# Patient Record
Sex: FEMALE | Race: WHITE | NOT HISPANIC OR LATINO | Employment: FULL TIME | URBAN - METROPOLITAN AREA
[De-identification: names, ages, dates, MRNs, and addresses within clinical notes are randomized per-mention and may not be internally consistent; named-entity substitution may affect disease eponyms.]

---

## 2017-10-02 ENCOUNTER — LAB VISIT (OUTPATIENT)
Dept: LAB | Facility: HOSPITAL | Age: 42
End: 2017-10-02
Attending: INTERNAL MEDICINE
Payer: COMMERCIAL

## 2017-10-02 DIAGNOSIS — Z01.31 ENCOUNTER FOR EXAMINATION OF BLOOD PRESSURE WITH ABNORMAL FINDINGS: ICD-10-CM

## 2017-10-02 DIAGNOSIS — J45.20 INTRINSIC ASTHMA WITHOUT STATUS ASTHMATICUS: ICD-10-CM

## 2017-10-02 DIAGNOSIS — D64.9 ABSOLUTE ANEMIA: ICD-10-CM

## 2017-10-02 DIAGNOSIS — D64.9 ABSOLUTE ANEMIA: Primary | ICD-10-CM

## 2017-10-02 LAB
ALBUMIN SERPL BCP-MCNC: 4 G/DL
ALP SERPL-CCNC: 55 U/L
ALT SERPL W/O P-5'-P-CCNC: 26 U/L
ANION GAP SERPL CALC-SCNC: 10 MMOL/L
AST SERPL-CCNC: 21 U/L
BASOPHILS # BLD AUTO: 0.03 K/UL
BASOPHILS NFR BLD: 0.4 %
BILIRUB SERPL-MCNC: 0.2 MG/DL
BUN SERPL-MCNC: 14 MG/DL
CALCIUM SERPL-MCNC: 9.7 MG/DL
CHLORIDE SERPL-SCNC: 106 MMOL/L
CHOLEST SERPL-MCNC: 144 MG/DL
CHOLEST/HDLC SERPL: 4 {RATIO}
CO2 SERPL-SCNC: 23 MMOL/L
CREAT SERPL-MCNC: 0.7 MG/DL
DIFFERENTIAL METHOD: ABNORMAL
EOSINOPHIL # BLD AUTO: 0.5 K/UL
EOSINOPHIL NFR BLD: 6.8 %
ERYTHROCYTE [DISTWIDTH] IN BLOOD BY AUTOMATED COUNT: 12.3 %
EST. GFR  (AFRICAN AMERICAN): >60 ML/MIN/1.73 M^2
EST. GFR  (NON AFRICAN AMERICAN): >60 ML/MIN/1.73 M^2
GLUCOSE SERPL-MCNC: 97 MG/DL
HCT VFR BLD AUTO: 37.6 %
HDLC SERPL-MCNC: 36 MG/DL
HDLC SERPL: 25 %
HGB BLD-MCNC: 12.7 G/DL
LDLC SERPL CALC-MCNC: 83.4 MG/DL
LYMPHOCYTES # BLD AUTO: 1.6 K/UL
LYMPHOCYTES NFR BLD: 19.8 %
MCH RBC QN AUTO: 32.2 PG
MCHC RBC AUTO-ENTMCNC: 33.8 G/DL
MCV RBC AUTO: 95 FL
MONOCYTES # BLD AUTO: 0.5 K/UL
MONOCYTES NFR BLD: 5.8 %
NEUTROPHILS # BLD AUTO: 5.4 K/UL
NEUTROPHILS NFR BLD: 66.8 %
NONHDLC SERPL-MCNC: 108 MG/DL
PLATELET # BLD AUTO: 276 K/UL
PMV BLD AUTO: 9.4 FL
POTASSIUM SERPL-SCNC: 4.4 MMOL/L
PROT SERPL-MCNC: 7.7 G/DL
RBC # BLD AUTO: 3.95 M/UL
SODIUM SERPL-SCNC: 139 MMOL/L
TRIGL SERPL-MCNC: 123 MG/DL
WBC # BLD AUTO: 7.99 K/UL

## 2017-10-02 PROCEDURE — 36415 COLL VENOUS BLD VENIPUNCTURE: CPT | Mod: PO

## 2017-10-02 PROCEDURE — 80061 LIPID PANEL: CPT

## 2017-10-02 PROCEDURE — 80053 COMPREHEN METABOLIC PANEL: CPT | Mod: PO

## 2017-10-02 PROCEDURE — 85025 COMPLETE CBC W/AUTO DIFF WBC: CPT | Mod: PO

## 2018-04-24 ENCOUNTER — HOSPITAL ENCOUNTER (EMERGENCY)
Facility: HOSPITAL | Age: 43
Discharge: HOME | End: 2018-04-25
Attending: EMERGENCY MEDICINE
Payer: COMMERCIAL

## 2018-04-24 DIAGNOSIS — R03.0 ELEVATED BLOOD PRESSURE READING WITHOUT DIAGNOSIS OF HYPERTENSION: ICD-10-CM

## 2018-04-24 DIAGNOSIS — R07.9 CHEST PAIN, UNSPECIFIED TYPE: Primary | ICD-10-CM

## 2018-04-24 DIAGNOSIS — M26.621 ARTHRALGIA OF RIGHT TEMPOROMANDIBULAR JOINT: ICD-10-CM

## 2018-04-24 LAB
ALBUMIN SERPL BCP-MCNC: 4.2 G/DL
ALP SERPL-CCNC: 77 U/L
ALT SERPL W/O P-5'-P-CCNC: 26 U/L
ANION GAP SERPL CALC-SCNC: 10 MMOL/L
AST SERPL-CCNC: 19 U/L
BASOPHILS # BLD AUTO: 0.02 K/UL
BASOPHILS NFR BLD: 0.1 %
BILIRUB SERPL-MCNC: 0.4 MG/DL
BNP SERPL-MCNC: <10 PG/ML
BUN SERPL-MCNC: 14 MG/DL
CALCIUM SERPL-MCNC: 9.8 MG/DL
CHLORIDE SERPL-SCNC: 104 MMOL/L
CO2 SERPL-SCNC: 22 MMOL/L
CREAT SERPL-MCNC: 0.8 MG/DL
D DIMER PPP IA.FEU-MCNC: 0.2 MG/L FEU
DIFFERENTIAL METHOD: ABNORMAL
EOSINOPHIL # BLD AUTO: 0.1 K/UL
EOSINOPHIL NFR BLD: 0.8 %
ERYTHROCYTE [DISTWIDTH] IN BLOOD BY AUTOMATED COUNT: 14.1 %
EST. GFR  (AFRICAN AMERICAN): >60 ML/MIN/1.73 M^2
EST. GFR  (NON AFRICAN AMERICAN): >60 ML/MIN/1.73 M^2
GLUCOSE SERPL-MCNC: 103 MG/DL
HCT VFR BLD AUTO: 34.4 %
HGB BLD-MCNC: 11.3 G/DL
LYMPHOCYTES # BLD AUTO: 1 K/UL
LYMPHOCYTES NFR BLD: 6.3 %
MCH RBC QN AUTO: 29 PG
MCHC RBC AUTO-ENTMCNC: 32.8 G/DL
MCV RBC AUTO: 88 FL
MONOCYTES # BLD AUTO: 0.9 K/UL
MONOCYTES NFR BLD: 5.4 %
NEUTROPHILS # BLD AUTO: 13.6 K/UL
NEUTROPHILS NFR BLD: 87.2 %
PLATELET # BLD AUTO: 273 K/UL
PMV BLD AUTO: 9.3 FL
POTASSIUM SERPL-SCNC: 4 MMOL/L
PROT SERPL-MCNC: 8 G/DL
RBC # BLD AUTO: 3.89 M/UL
SODIUM SERPL-SCNC: 136 MMOL/L
TROPONIN I SERPL DL<=0.01 NG/ML-MCNC: <0.006 NG/ML
WBC # BLD AUTO: 15.63 K/UL

## 2018-04-24 PROCEDURE — 80053 COMPREHEN METABOLIC PANEL: CPT

## 2018-04-24 PROCEDURE — 85379 FIBRIN DEGRADATION QUANT: CPT

## 2018-04-24 PROCEDURE — 93010 ELECTROCARDIOGRAM REPORT: CPT | Mod: ,,, | Performed by: INTERNAL MEDICINE

## 2018-04-24 PROCEDURE — 84484 ASSAY OF TROPONIN QUANT: CPT | Mod: 91

## 2018-04-24 PROCEDURE — 99284 EMERGENCY DEPT VISIT MOD MDM: CPT | Mod: 25

## 2018-04-24 PROCEDURE — 93005 ELECTROCARDIOGRAM TRACING: CPT

## 2018-04-24 PROCEDURE — 83880 ASSAY OF NATRIURETIC PEPTIDE: CPT

## 2018-04-24 PROCEDURE — 25000003 PHARM REV CODE 250: Performed by: EMERGENCY MEDICINE

## 2018-04-24 PROCEDURE — 99900035 HC TECH TIME PER 15 MIN (STAT)

## 2018-04-24 PROCEDURE — 85025 COMPLETE CBC W/AUTO DIFF WBC: CPT

## 2018-04-24 RX ORDER — NAPROXEN SODIUM 220 MG/1
324 TABLET, FILM COATED ORAL
Status: COMPLETED | OUTPATIENT
Start: 2018-04-24 | End: 2018-04-24

## 2018-04-24 RX ADMIN — ASPIRIN 81 MG 324 MG: 81 TABLET ORAL at 09:04

## 2018-04-25 VITALS
SYSTOLIC BLOOD PRESSURE: 118 MMHG | HEART RATE: 100 BPM | DIASTOLIC BLOOD PRESSURE: 70 MMHG | RESPIRATION RATE: 20 BRPM | HEIGHT: 66 IN | BODY MASS INDEX: 33.17 KG/M2 | TEMPERATURE: 99 F | OXYGEN SATURATION: 100 % | WEIGHT: 206.38 LBS

## 2018-04-25 LAB — TROPONIN I SERPL DL<=0.01 NG/ML-MCNC: <0.006 NG/ML

## 2018-04-25 NOTE — ED NOTES
Pt resting in bed. NAD, VSS, RR equal and unlabored. C/O right shoulder & neck pain off & on since yesterday. She denies left sided chest pain.  Took 800mg Motrin earlier tonight with relief.   She also states she became dizzy & weak earlier this pm.   Pt provides hx of TMJ & isnt sure if its the same pain as usual. Will continue to monitorLOC: The patient is awake, alert and aware of environment with an appropriate affect, the patient is oriented x 3 and speaking appropriately.  APPEARANCE: Patient resting comfortably and in no acute distress, patient is clean and well groomed, patient's clothing is properly fastened.  HEENT: Brief WNL  SKIN: Brief WNL.   MUSCULOSKELETAL: Brief WNL  RESPIRATORY: Brief WNL, chest clear gillian, resp unlabored  CARDIAC: Sinus tachycardia at 122/min, denies chest pain  GASTRO: Brief WNL, denies N,V or D  : Brief WNL  Peripheral Vasc: Brief WNL, No edema, positive distal pulses  NEURO: Brief WNL  PSYCH: Brief WNL

## 2018-04-25 NOTE — ED PROVIDER NOTES
Encounter Date: 4/24/2018       History     Chief Complaint   Patient presents with    Chest Pain     patient is reporting chest shoulder and jaw pain on right side onset last pm and today. She also reports being dizzy and weak.     Patient currently presents with chief complaint of chest pain.  This current episode began around 7PM this evening though she has had symptoms intermittently since last PM.  This is localized to the right shoulder and right jaw region.  Patient denies shortness of breath, denies palpitations,  reports nausea and weakness, denies diaphoresis.  Patient denies aspirin use in the last 24 hours or regular use at home. Patient denies history of known CAD.  Cardiac risk factors include:  tobacco use at 1/4PPD.  Denies FH of CAD, HTN, DM.          Review of patient's allergies indicates:  No Known Allergies  Past Medical History:   Diagnosis Date    Asthma     History of viral warts     warts to fingers    Thyroid disease      Past Surgical History:   Procedure Laterality Date    CHOLECYSTECTOMY       Family History   Problem Relation Age of Onset    Diabetes Mother     Hypertension Mother      Social History   Substance Use Topics    Smoking status: Former Smoker     Packs/day: 0.30     Types: Cigarettes     Quit date: 7/1/2013    Smokeless tobacco: Never Used    Alcohol use No     Review of Systems   Constitutional: Negative for chills and fever.   HENT: Negative for congestion and rhinorrhea.    Respiratory: Negative for cough, shortness of breath and wheezing.    Cardiovascular: Negative for palpitations and leg swelling.   Gastrointestinal: Positive for nausea. Negative for abdominal pain, constipation, diarrhea and vomiting.   Genitourinary: Negative for dysuria, frequency, urgency, vaginal bleeding and vaginal discharge.   Skin: Negative for color change and rash.   Allergic/Immunologic: Negative for immunocompromised state.   Neurological: Positive for weakness. Negative for  "dizziness and numbness.   Hematological: Negative for adenopathy. Does not bruise/bleed easily.   All other systems reviewed and are negative.      Physical Exam     Initial Vitals [04/24/18 2108]   BP Pulse Resp Temp SpO2   138/65 105 16 99.4 °F (37.4 °C) 100 %      MAP       89.33         Vitals:    04/24/18 2108   BP: 138/65   Pulse: 105   Resp: 16   Temp: 99.4 °F (37.4 °C)   TempSrc: Oral   SpO2: 100%   Weight: 93.6 kg (206 lb 6.4 oz)   Height: 5' 6" (1.676 m)         Physical Exam    Nursing note and vitals reviewed.  Constitutional: She appears well-developed and well-nourished. She is not diaphoretic. No distress.   HENT:   Head: Normocephalic and atraumatic.   Right Ear: External ear normal.   Left Ear: External ear normal.   Nose: Nose normal.   Mouth/Throat: Oropharynx is clear and moist.   Eyes: Conjunctivae and EOM are normal. Pupils are equal, round, and reactive to light. No scleral icterus.   Neck: Neck supple. No tracheal deviation present. No JVD present.   Cardiovascular: Normal rate, regular rhythm, normal heart sounds and intact distal pulses. Exam reveals no gallop and no friction rub.    No murmur heard.  Pulmonary/Chest: Breath sounds normal. No respiratory distress. She has no wheezes. She has no rhonchi. She has no rales.   Abdominal: Soft. Bowel sounds are normal. She exhibits no distension. There is no tenderness.   Musculoskeletal: Normal range of motion. She exhibits no edema.   Neurological: She is alert and oriented to person, place, and time. She has normal strength. No cranial nerve deficit or sensory deficit.   Skin: Skin is warm and dry. No rash noted.   Psychiatric: She has a normal mood and affect. Her behavior is normal.         ED Course   Procedures  Labs Reviewed   CBC W/ AUTO DIFFERENTIAL - Abnormal; Notable for the following:        Result Value    WBC 15.63 (*)     RBC 3.89 (*)     Hemoglobin 11.3 (*)     Hematocrit 34.4 (*)     Gran # (ANC) 13.6 (*)     Gran% 87.2 (*)     " Lymph% 6.3 (*)     All other components within normal limits   COMPREHENSIVE METABOLIC PANEL - Abnormal; Notable for the following:     CO2 22 (*)     All other components within normal limits   B-TYPE NATRIURETIC PEPTIDE   TROPONIN I   D DIMER, QUANTITATIVE     EKG Readings: (Independently Interpreted)   Initial Reading: No STEMI. Rhythm: Sinus Tachycardia. Heart Rate: 111. Ectopy: No Ectopy.     Imaging Results          X-Ray Chest AP Portable (Final result)  Result time 04/24/18 21:58:36    Final result by Mateo Biggs III, MD (04/24/18 21:58:36)                 Impression:         Negative one view chest x-ray.      Electronically signed by: MATEO BIGGS MD  Date:     04/24/18  Time:    21:58              Narrative:    One view chest x-ray.    Clinical indication: Chest pain    Heart size is normal. The lung fields are clear. No acute pulmonary infiltrate.                                 Medical Decision Making:   Differential Diagnosis:   YUMIKO Risk Score:  1 point  5% risk of MACE at 14days    INPUTS:  Age =65 -> 0 = No  =3 CAD risk factors -> 0 = No  Known CAD (stenosis =50%) -> 0 = No  ASA use in past 7 days -> 0 = No  Severe angina (=2 episodes in 24 hrs) -> 0 = No  EKG ST changes =0.5mm -> 1 = Yes  Positive cardiac marker -> 0 = No      Wells' Criteria for Pulmonary Embolism  1.5 points  Low risk group: 1.3% chance of PE    INPUTS:  Clinical signs and symptoms of DVT -> 0 = No  PE is #1 diagnosis OR equally likely -> 0 = No  Heart rate > 100 -> 1.5 = Yes  Immobilization at least 3 days OR surgery in the previous 4 weeks -> 0 = No  Previous, objectively diagnosed PE or DVT -> 0 = No  Hemoptysis -> 0 = No  Malignancy w/ treatment within 6 months or palliative -> 0 = No    ED Management:  All findings were reviewed with the patient/family in detail along with the diagnosis of chest pain.  Given the patient's low risk for MACE based on a carefully determined YUMIKO score (1) and overall clinical  judgement, unremarkable troponin levels drawn at the time of arrival and again 2 hours later have been used to effectively rule out ACS (as per AHA recommendations).  Additionally, I have no considerable suspicion for aortic dissection/aneurysm, esophageal perforation, or acute pulmonary complications based on the presentation, exam, lab results, or imaging.    I see no indication of an emergent process beyond that addressed during our encounter but have duly counseled the patient/family regarding the need for prompt outpatient follow-up as well as the indications that should prompt immediate return to the emergency room should new or worrisome developments occur.  The patient has been provided with both verbal and written instructions following the encounter.  The patient/family communicates understanding of all this information and all remaining questions and concerns were addressed at this time.                          Clinical Impression:   The primary encounter diagnosis was Chest pain, unspecified type. Diagnoses of Arthralgia of right temporomandibular joint and Elevated blood pressure reading without diagnosis of hypertension were also pertinent to this visit.                           Jim Hernández MD  04/25/18 0034

## 2018-04-25 NOTE — DISCHARGE INSTRUCTIONS
Based on vital signs taken here in the emergency room today, the patient was additionally counseled regarding an elevated blood pressure concerning for pre-hypertension/hypertension.  Accordingly the patient has been advised to follow with the primary care physician for reassessment and management as needed.

## 2021-12-27 ENCOUNTER — HOSPITAL ENCOUNTER (INPATIENT)
Facility: HOSPITAL | Age: 46
LOS: 11 days | Discharge: NURSING, REHAB OR LONG-TERM CARE | DRG: 177 | End: 2022-01-08
Attending: EMERGENCY MEDICINE | Admitting: INTERNAL MEDICINE
Payer: COMMERCIAL

## 2021-12-27 DIAGNOSIS — Z20.822 SUSPECTED COVID-19 VIRUS INFECTION: ICD-10-CM

## 2021-12-27 DIAGNOSIS — R06.02 SOB (SHORTNESS OF BREATH): ICD-10-CM

## 2021-12-27 DIAGNOSIS — R07.9 CHEST PAIN: ICD-10-CM

## 2021-12-27 DIAGNOSIS — J18.9 ATYPICAL PNEUMONIA: ICD-10-CM

## 2021-12-27 DIAGNOSIS — J96.01 ACUTE RESPIRATORY FAILURE WITH HYPOXIA: Primary | ICD-10-CM

## 2021-12-27 DIAGNOSIS — J45.901 SEVERE ASTHMA WITH ACUTE EXACERBATION, UNSPECIFIED WHETHER PERSISTENT: ICD-10-CM

## 2021-12-27 PROBLEM — R06.89 ACUTE RESPIRATORY INSUFFICIENCY: Status: ACTIVE | Noted: 2021-12-27

## 2021-12-27 LAB
ALBUMIN SERPL BCP-MCNC: 3 G/DL (ref 3.5–5.2)
ALP SERPL-CCNC: 207 U/L (ref 55–135)
ALT SERPL W/O P-5'-P-CCNC: 45 U/L (ref 10–44)
ANION GAP SERPL CALC-SCNC: 14 MMOL/L (ref 8–16)
AST SERPL-CCNC: 50 U/L (ref 10–40)
B-HCG UR QL: NEGATIVE
BASOPHILS # BLD AUTO: 0.01 K/UL (ref 0–0.2)
BASOPHILS NFR BLD: 0.1 % (ref 0–1.9)
BILIRUB SERPL-MCNC: 0.6 MG/DL (ref 0.1–1)
BILIRUB UR QL STRIP: NEGATIVE
BNP SERPL-MCNC: 38 PG/ML (ref 0–99)
BUN SERPL-MCNC: 8 MG/DL (ref 6–20)
CALCIUM SERPL-MCNC: 9 MG/DL (ref 8.7–10.5)
CHLORIDE SERPL-SCNC: 101 MMOL/L (ref 95–110)
CLARITY UR REFRACT.AUTO: CLEAR
CO2 SERPL-SCNC: 23 MMOL/L (ref 23–29)
COLOR UR AUTO: YELLOW
CREAT SERPL-MCNC: 0.7 MG/DL (ref 0.5–1.4)
CTP QC/QA: YES
CTP QC/QA: YES
DIFFERENTIAL METHOD: ABNORMAL
EOSINOPHIL # BLD AUTO: 0 K/UL (ref 0–0.5)
EOSINOPHIL NFR BLD: 0.1 % (ref 0–8)
ERYTHROCYTE [DISTWIDTH] IN BLOOD BY AUTOMATED COUNT: 12.6 % (ref 11.5–14.5)
ERYTHROCYTE [SEDIMENTATION RATE] IN BLOOD BY WESTERGREN METHOD: 97 MM/HR (ref 0–20)
EST. GFR  (AFRICAN AMERICAN): >60 ML/MIN/1.73 M^2
EST. GFR  (NON AFRICAN AMERICAN): >60 ML/MIN/1.73 M^2
FERRITIN SERPL-MCNC: 1084 NG/ML (ref 20–300)
GLUCOSE SERPL-MCNC: 104 MG/DL (ref 70–110)
GLUCOSE UR QL STRIP: NEGATIVE
HCT VFR BLD AUTO: 37.2 % (ref 37–48.5)
HEP C VIRUS HOLD SPECIMEN: NORMAL
HGB BLD-MCNC: 12.5 G/DL (ref 12–16)
HGB UR QL STRIP: NEGATIVE
IMM GRANULOCYTES # BLD AUTO: 0.04 K/UL (ref 0–0.04)
IMM GRANULOCYTES NFR BLD AUTO: 0.5 % (ref 0–0.5)
KETONES UR QL STRIP: ABNORMAL
LDH SERPL L TO P-CCNC: 518 U/L (ref 110–260)
LEUKOCYTE ESTERASE UR QL STRIP: NEGATIVE
LYMPHOCYTES # BLD AUTO: 0.5 K/UL (ref 1–4.8)
LYMPHOCYTES NFR BLD: 6.5 % (ref 18–48)
MCH RBC QN AUTO: 32.3 PG (ref 27–31)
MCHC RBC AUTO-ENTMCNC: 33.6 G/DL (ref 32–36)
MCV RBC AUTO: 96 FL (ref 82–98)
MONOCYTES # BLD AUTO: 0.2 K/UL (ref 0.3–1)
MONOCYTES NFR BLD: 2.4 % (ref 4–15)
NEUTROPHILS # BLD AUTO: 6.9 K/UL (ref 1.8–7.7)
NEUTROPHILS NFR BLD: 90.4 % (ref 38–73)
NITRITE UR QL STRIP: NEGATIVE
NRBC BLD-RTO: 0 /100 WBC
PH UR STRIP: 6 [PH] (ref 5–8)
PLATELET # BLD AUTO: 167 K/UL (ref 150–450)
PLATELET BLD QL SMEAR: ABNORMAL
PMV BLD AUTO: 10.3 FL (ref 9.2–12.9)
POC MOLECULAR INFLUENZA A AGN: NEGATIVE
POC MOLECULAR INFLUENZA B AGN: NEGATIVE
POTASSIUM SERPL-SCNC: 3.6 MMOL/L (ref 3.5–5.1)
PROCALCITONIN SERPL IA-MCNC: 0.16 NG/ML
PROT SERPL-MCNC: 7.6 G/DL (ref 6–8.4)
PROT UR QL STRIP: NEGATIVE
RBC # BLD AUTO: 3.87 M/UL (ref 4–5.4)
SARS-COV-2 RDRP RESP QL NAA+PROBE: NEGATIVE
SODIUM SERPL-SCNC: 138 MMOL/L (ref 136–145)
SP GR UR STRIP: <=1.005 (ref 1–1.03)
TROPONIN I SERPL DL<=0.01 NG/ML-MCNC: <0.006 NG/ML (ref 0–0.03)
URN SPEC COLLECT METH UR: ABNORMAL
UROBILINOGEN UR STRIP-ACNC: NEGATIVE EU/DL
WBC # BLD AUTO: 7.64 K/UL (ref 3.9–12.7)

## 2021-12-27 PROCEDURE — 93010 EKG 12-LEAD: ICD-10-PCS | Mod: ,,, | Performed by: INTERNAL MEDICINE

## 2021-12-27 PROCEDURE — 84145 PROCALCITONIN (PCT): CPT | Performed by: NURSE PRACTITIONER

## 2021-12-27 PROCEDURE — 80053 COMPREHEN METABOLIC PANEL: CPT | Mod: ER | Performed by: EMERGENCY MEDICINE

## 2021-12-27 PROCEDURE — 83880 ASSAY OF NATRIURETIC PEPTIDE: CPT | Performed by: NURSE PRACTITIONER

## 2021-12-27 PROCEDURE — 85025 COMPLETE CBC W/AUTO DIFF WBC: CPT | Mod: ER | Performed by: EMERGENCY MEDICINE

## 2021-12-27 PROCEDURE — U0002 COVID-19 LAB TEST NON-CDC: HCPCS | Mod: ER | Performed by: EMERGENCY MEDICINE

## 2021-12-27 PROCEDURE — 81025 URINE PREGNANCY TEST: CPT | Mod: ER | Performed by: EMERGENCY MEDICINE

## 2021-12-27 PROCEDURE — 94640 AIRWAY INHALATION TREATMENT: CPT

## 2021-12-27 PROCEDURE — 93005 ELECTROCARDIOGRAM TRACING: CPT | Mod: ER

## 2021-12-27 PROCEDURE — 84484 ASSAY OF TROPONIN QUANT: CPT | Mod: ER | Performed by: EMERGENCY MEDICINE

## 2021-12-27 PROCEDURE — 85651 RBC SED RATE NONAUTOMATED: CPT | Performed by: NURSE PRACTITIONER

## 2021-12-27 PROCEDURE — 25000003 PHARM REV CODE 250: Mod: ER | Performed by: EMERGENCY MEDICINE

## 2021-12-27 PROCEDURE — 99291 CRITICAL CARE FIRST HOUR: CPT | Mod: 25,ER

## 2021-12-27 PROCEDURE — G0378 HOSPITAL OBSERVATION PER HR: HCPCS | Mod: ER

## 2021-12-27 PROCEDURE — 99900035 HC TECH TIME PER 15 MIN (STAT): Mod: ER

## 2021-12-27 PROCEDURE — 82728 ASSAY OF FERRITIN: CPT | Performed by: NURSE PRACTITIONER

## 2021-12-27 PROCEDURE — 25000242 PHARM REV CODE 250 ALT 637 W/ HCPCS: Mod: ER | Performed by: EMERGENCY MEDICINE

## 2021-12-27 PROCEDURE — 36415 COLL VENOUS BLD VENIPUNCTURE: CPT | Performed by: NURSE PRACTITIONER

## 2021-12-27 PROCEDURE — 86141 C-REACTIVE PROTEIN HS: CPT | Performed by: NURSE PRACTITIONER

## 2021-12-27 PROCEDURE — 25000242 PHARM REV CODE 250 ALT 637 W/ HCPCS: Performed by: NURSE PRACTITIONER

## 2021-12-27 PROCEDURE — 99900035 HC TECH TIME PER 15 MIN (STAT)

## 2021-12-27 PROCEDURE — 96374 THER/PROPH/DIAG INJ IV PUSH: CPT | Mod: ER

## 2021-12-27 PROCEDURE — 94640 AIRWAY INHALATION TREATMENT: CPT | Mod: ER

## 2021-12-27 PROCEDURE — 81003 URINALYSIS AUTO W/O SCOPE: CPT | Mod: ER | Performed by: EMERGENCY MEDICINE

## 2021-12-27 PROCEDURE — 83615 LACTATE (LD) (LDH) ENZYME: CPT | Performed by: NURSE PRACTITIONER

## 2021-12-27 PROCEDURE — G0378 HOSPITAL OBSERVATION PER HR: HCPCS

## 2021-12-27 PROCEDURE — 63600175 PHARM REV CODE 636 W HCPCS: Performed by: NURSE PRACTITIONER

## 2021-12-27 PROCEDURE — 25000003 PHARM REV CODE 250: Performed by: NURSE PRACTITIONER

## 2021-12-27 PROCEDURE — 63600175 PHARM REV CODE 636 W HCPCS: Mod: ER | Performed by: EMERGENCY MEDICINE

## 2021-12-27 PROCEDURE — 93010 ELECTROCARDIOGRAM REPORT: CPT | Mod: ,,, | Performed by: INTERNAL MEDICINE

## 2021-12-27 PROCEDURE — 25500020 PHARM REV CODE 255: Performed by: FAMILY MEDICINE

## 2021-12-27 RX ORDER — MAGNESIUM SULFATE 1 G/100ML
1 INJECTION INTRAVENOUS ONCE
Status: DISCONTINUED | OUTPATIENT
Start: 2021-12-27 | End: 2021-12-27

## 2021-12-27 RX ORDER — NALOXONE HCL 0.4 MG/ML
0.02 VIAL (ML) INJECTION
Status: DISCONTINUED | OUTPATIENT
Start: 2021-12-27 | End: 2022-01-08 | Stop reason: HOSPADM

## 2021-12-27 RX ORDER — ENOXAPARIN SODIUM 100 MG/ML
40 INJECTION SUBCUTANEOUS EVERY 24 HOURS
Status: DISCONTINUED | OUTPATIENT
Start: 2021-12-27 | End: 2021-12-28

## 2021-12-27 RX ORDER — LEVOTHYROXINE SODIUM 125 UG/1
125 TABLET ORAL DAILY
Status: DISCONTINUED | OUTPATIENT
Start: 2021-12-28 | End: 2022-01-08 | Stop reason: HOSPADM

## 2021-12-27 RX ORDER — TALC
6 POWDER (GRAM) TOPICAL NIGHTLY PRN
Status: DISCONTINUED | OUTPATIENT
Start: 2021-12-27 | End: 2022-01-08 | Stop reason: HOSPADM

## 2021-12-27 RX ORDER — ALBUTEROL SULFATE 90 UG/1
2 AEROSOL, METERED RESPIRATORY (INHALATION) EVERY 6 HOURS
Status: DISCONTINUED | OUTPATIENT
Start: 2021-12-27 | End: 2021-12-28

## 2021-12-27 RX ORDER — MAGNESIUM SULFATE HEPTAHYDRATE 40 MG/ML
2 INJECTION, SOLUTION INTRAVENOUS
Status: DISCONTINUED | OUTPATIENT
Start: 2021-12-27 | End: 2021-12-27

## 2021-12-27 RX ORDER — IPRATROPIUM BROMIDE AND ALBUTEROL SULFATE 2.5; .5 MG/3ML; MG/3ML
3 SOLUTION RESPIRATORY (INHALATION)
Status: COMPLETED | OUTPATIENT
Start: 2021-12-27 | End: 2021-12-27

## 2021-12-27 RX ORDER — LANOLIN ALCOHOL/MO/W.PET/CERES
800 CREAM (GRAM) TOPICAL
Status: DISCONTINUED | OUTPATIENT
Start: 2021-12-27 | End: 2022-01-08 | Stop reason: HOSPADM

## 2021-12-27 RX ORDER — SODIUM CHLORIDE 0.9 % (FLUSH) 0.9 %
10 SYRINGE (ML) INJECTION EVERY 8 HOURS PRN
Status: DISCONTINUED | OUTPATIENT
Start: 2021-12-27 | End: 2022-01-08 | Stop reason: HOSPADM

## 2021-12-27 RX ORDER — ACETAMINOPHEN 325 MG/1
650 TABLET ORAL EVERY 4 HOURS PRN
Status: DISCONTINUED | OUTPATIENT
Start: 2021-12-27 | End: 2022-01-08 | Stop reason: HOSPADM

## 2021-12-27 RX ORDER — ONDANSETRON 2 MG/ML
4 INJECTION INTRAMUSCULAR; INTRAVENOUS EVERY 8 HOURS PRN
Status: DISCONTINUED | OUTPATIENT
Start: 2021-12-27 | End: 2022-01-08 | Stop reason: HOSPADM

## 2021-12-27 RX ORDER — PROCHLORPERAZINE EDISYLATE 5 MG/ML
5 INJECTION INTRAMUSCULAR; INTRAVENOUS EVERY 6 HOURS PRN
Status: DISCONTINUED | OUTPATIENT
Start: 2021-12-27 | End: 2022-01-08 | Stop reason: HOSPADM

## 2021-12-27 RX ORDER — SIMETHICONE 80 MG
1 TABLET,CHEWABLE ORAL 4 TIMES DAILY PRN
Status: DISCONTINUED | OUTPATIENT
Start: 2021-12-27 | End: 2022-01-08 | Stop reason: HOSPADM

## 2021-12-27 RX ORDER — METHYLPREDNISOLONE SOD SUCC 125 MG
125 VIAL (EA) INJECTION
Status: COMPLETED | OUTPATIENT
Start: 2021-12-27 | End: 2021-12-27

## 2021-12-27 RX ORDER — MAG HYDROX/ALUMINUM HYD/SIMETH 200-200-20
30 SUSPENSION, ORAL (FINAL DOSE FORM) ORAL 4 TIMES DAILY PRN
Status: DISCONTINUED | OUTPATIENT
Start: 2021-12-27 | End: 2022-01-08 | Stop reason: HOSPADM

## 2021-12-27 RX ORDER — ACETAMINOPHEN 325 MG/1
650 TABLET ORAL EVERY 8 HOURS PRN
Status: DISCONTINUED | OUTPATIENT
Start: 2021-12-27 | End: 2022-01-08 | Stop reason: HOSPADM

## 2021-12-27 RX ADMIN — IPRATROPIUM BROMIDE AND ALBUTEROL SULFATE 3 ML: 2.5; .5 SOLUTION RESPIRATORY (INHALATION) at 08:12

## 2021-12-27 RX ADMIN — ENOXAPARIN SODIUM 40 MG: 100 INJECTION SUBCUTANEOUS at 10:12

## 2021-12-27 RX ADMIN — ACETAMINOPHEN 650 MG: 325 TABLET ORAL at 08:12

## 2021-12-27 RX ADMIN — SODIUM CHLORIDE, SODIUM LACTATE, POTASSIUM CHLORIDE, AND CALCIUM CHLORIDE 1000 ML: .6; .31; .03; .02 INJECTION, SOLUTION INTRAVENOUS at 12:12

## 2021-12-27 RX ADMIN — IOHEXOL 100 ML: 350 INJECTION, SOLUTION INTRAVENOUS at 06:12

## 2021-12-27 RX ADMIN — CEFTRIAXONE 1 G: 1 INJECTION, SOLUTION INTRAVENOUS at 11:12

## 2021-12-27 RX ADMIN — METHYLPREDNISOLONE SODIUM SUCCINATE 125 MG: 125 INJECTION, POWDER, FOR SOLUTION INTRAMUSCULAR; INTRAVENOUS at 09:12

## 2021-12-27 RX ADMIN — AZITHROMYCIN MONOHYDRATE 500 MG: 500 INJECTION, POWDER, LYOPHILIZED, FOR SOLUTION INTRAVENOUS at 12:12

## 2021-12-27 RX ADMIN — Medication 6 MG: at 08:12

## 2021-12-27 RX ADMIN — ALBUTEROL SULFATE 2 PUFF: 90 AEROSOL, METERED RESPIRATORY (INHALATION) at 09:12

## 2021-12-27 NOTE — ED PROVIDER NOTES
Encounter Date: 2021       History     Chief Complaint   Patient presents with    Shortness of Breath     Yanira Medina is a 46 y.o. female who presents with 2-3 days of gradual onset, worsening, severe SOB with associated chest tightness and cough at home.  Multiple family members sick with viral/URI symptoms and one tested positive for COVID-19.         Review of patient's allergies indicates:  No Known Allergies  Past Medical History:   Diagnosis Date    Asthma     History of viral warts     warts to fingers    Thyroid disease      Past Surgical History:   Procedure Laterality Date    CHOLECYSTECTOMY      HYSTERECTOMY       Family History   Problem Relation Age of Onset    Diabetes Mother     Hypertension Mother      Social History     Tobacco Use    Smoking status: Former Smoker     Packs/day: 0.30     Types: Cigarettes     Quit date: 2013     Years since quittin.4    Smokeless tobacco: Never Used   Substance Use Topics    Alcohol use: No    Drug use: No     Review of Systems   Constitutional: Positive for fatigue. Negative for fever.   HENT: Negative.    Eyes: Negative.    Respiratory: Positive for cough, chest tightness and shortness of breath.    Cardiovascular: Negative.    Gastrointestinal: Negative.    Endocrine: Negative.    Genitourinary: Negative.    Musculoskeletal: Negative.    Skin: Negative.    Allergic/Immunologic: Negative.    Neurological: Negative.    Hematological: Negative.    Psychiatric/Behavioral: Negative.    All other systems reviewed and are negative.      Physical Exam     Initial Vitals [21 0844]   BP Pulse Resp Temp SpO2   131/60 99 20 98.1 °F (36.7 °C) (!) 88 %      MAP       --         Physical Exam    Nursing note and vitals reviewed.  Constitutional: She appears well-developed and well-nourished. No distress.   HENT:   Head: Normocephalic and atraumatic.   Eyes: Conjunctivae and EOM are normal. Pupils are equal, round, and reactive to  light.   Neck: Neck supple.   Cardiovascular: Normal rate, regular rhythm, normal heart sounds and intact distal pulses.   Pulmonary/Chest: She is in respiratory distress. She has rales (faint, scattered).   Decreased/tight air exchange but without active wheezes; tachypnea; can't complete a sentence   Abdominal: Abdomen is soft. She exhibits no distension. There is no abdominal tenderness.   Musculoskeletal:         General: Normal range of motion.      Cervical back: Neck supple.     Neurological: She is alert and oriented to person, place, and time. She has normal strength.   Skin: Skin is warm and dry. Capillary refill takes less than 2 seconds.   Psychiatric: She has a normal mood and affect.         ED Course   Critical Care    Date/Time: 12/27/2021 10:40 AM  Performed by: Bhanu Abad MD  Authorized by: Bhanu Abad MD   Direct patient critical care time: 15 minutes  Additional history critical care time: 5 minutes  Ordering / reviewing critical care time: 5 minutes  Documentation critical care time: 7 minutes  Consulting other physicians critical care time: 5 minutes  Total critical care time (exclusive of procedural time) : 37 minutes  Critical care time was exclusive of separately billable procedures and treating other patients and teaching time.  Critical care was necessary to treat or prevent imminent or life-threatening deterioration of the following conditions: respiratory failure.  Critical care was time spent personally by me on the following activities: blood draw for specimens, development of treatment plan with patient or surrogate, discussions with consultants, evaluation of patient's response to treatment, examination of patient, obtaining history from patient or surrogate, ordering and performing treatments and interventions, ordering and review of laboratory studies, ordering and review of radiographic studies, pulse oximetry, re-evaluation of patient's condition and review of  old charts.        Labs Reviewed   URINALYSIS, REFLEX TO URINE CULTURE - Abnormal; Notable for the following components:       Result Value    Specific Gravity, UA <=1.005 (*)     Ketones, UA 2+ (*)     All other components within normal limits    Narrative:     Specimen Source->Urine   CBC W/ AUTO DIFFERENTIAL - Abnormal; Notable for the following components:    RBC 3.87 (*)     MCH 32.3 (*)     Lymph # 0.5 (*)     Mono # 0.2 (*)     Gran % 90.4 (*)     Lymph % 6.5 (*)     Mono % 2.4 (*)     All other components within normal limits    Narrative:     Release to patient->Immediate   COMPREHENSIVE METABOLIC PANEL - Abnormal; Notable for the following components:    Albumin 3.0 (*)     Alkaline Phosphatase 207 (*)     AST 50 (*)     ALT 45 (*)     All other components within normal limits    Narrative:     Release to patient->Immediate   PREGNANCY TEST, URINE RAPID    Narrative:     Specimen Source->Urine   TROPONIN I    Narrative:     Release to patient->Immediate   HEP C VIRUS HOLD SPECIMEN   POCT INFLUENZA A/B MOLECULAR   SARS-COV-2 RDRP GENE    Narrative:     This test utilizes isothermal nucleic acid amplification   technology to detect the SARS-CoV-2 RdRp nucleic acid segment.   The analytical sensitivity (limit of detection) is 125 genome   equivalents/mL.   A POSITIVE result implies infection with the SARS-CoV-2 virus;   the patient is presumed to be contagious.     A NEGATIVE result means that SARS-CoV-2 nucleic acids are not   present above the limit of detection. A NEGATIVE result should be   treated as presumptive. It does not rule out the possibility of   COVID-19 and should not be the sole basis for treatment decisions.   If COVID-19 is strongly suspected based on clinical and exposure   history, re-testing using an alternate molecular assay should be   considered.   This test is only for use under the Food and Drug   Administration s Emergency Use Authorization (EUA).   Commercial kits are provided by  [employer].   Performance characteristics of the EUA have been independently   verified by [facility] Department of   Pathology and Laboratory Medicine.   _________________________________________________________________   The authorized Fact Sheet for Healthcare Providers and the authorized Fact   Sheet for Patients of the ID NOW COVID-19 are available on the FDA   website:     https://www.fda.gov/media/053815/download  https://www.fda.gov/media/530723/download              Imaging Results          X-Ray Chest AP Portable (Final result)  Result time 12/27/21 09:27:27    Final result by Mateo Wren MD (12/27/21 09:27:27)                 Impression:      Scattered pulmonary infiltrates. Findings are not entirely specific but can be seen with multifocal airspace disease.      Electronically signed by: Mateo Wren MD  Date:    12/27/2021  Time:    09:27             Narrative:    EXAMINATION:  XR CHEST AP PORTABLE    CLINICAL HISTORY:  Shortness of breath    FINDINGS:  Single view of the chest.  04/24/2018    Cardiac silhouette is normal.  Scattered pulmonary infiltrates. Findings are not entirely specific but can be seen with multifocal airspace disease.  No evidence of pleural effusion or pneumothorax.  Bones appear intact.                                 Medications   azithromycin 500 mg in dextrose 5 % 250 mL IVPB (ready to mix system) (has no administration in time range)   cefTRIAXone (ROCEPHIN) 1 g/50 mL D5W IVPB (has no administration in time range)   lactated ringers bolus 1,000 mL (has no administration in time range)   magnesium sulfate in dextrose IVPB (premix) 1 g (has no administration in time range)     Followed by   magnesium sulfate in dextrose IVPB (premix) 1 g (has no administration in time range)   albuterol-ipratropium 2.5 mg-0.5 mg/3 mL nebulizer solution 3 mL (3 mLs Nebulization Given 12/27/21 0858)   methylPREDNISolone sodium succinate injection 125 mg (125 mg  Intravenous Given 12/27/21 0922)                 ED Course as of 12/27/21 1042   Mon Dec 27, 2021   1041 SpO2 86% at rest after nebs x3 and solumedrol.  [ND]      ED Course User Index  [ND] Bhanu Abad MD            Recent Results (from the past 24 hour(s))   CBC auto differential    Collection Time: 12/27/21  8:55 AM   Result Value Ref Range    WBC 7.64 3.90 - 12.70 K/uL    RBC 3.87 (L) 4.00 - 5.40 M/uL    Hemoglobin 12.5 12.0 - 16.0 g/dL    Hematocrit 37.2 37.0 - 48.5 %    MCV 96 82 - 98 fL    MCH 32.3 (H) 27.0 - 31.0 pg    MCHC 33.6 32.0 - 36.0 g/dL    RDW 12.6 11.5 - 14.5 %    Platelets 167 150 - 450 K/uL    MPV 10.3 9.2 - 12.9 fL    Immature Granulocytes 0.5 0.0 - 0.5 %    Gran # (ANC) 6.9 1.8 - 7.7 K/uL    Immature Grans (Abs) 0.04 0.00 - 0.04 K/uL    Lymph # 0.5 (L) 1.0 - 4.8 K/uL    Mono # 0.2 (L) 0.3 - 1.0 K/uL    Eos # 0.0 0.0 - 0.5 K/uL    Baso # 0.01 0.00 - 0.20 K/uL    nRBC 0 0 /100 WBC    Gran % 90.4 (H) 38.0 - 73.0 %    Lymph % 6.5 (L) 18.0 - 48.0 %    Mono % 2.4 (L) 4.0 - 15.0 %    Eosinophil % 0.1 0.0 - 8.0 %    Basophil % 0.1 0.0 - 1.9 %    Platelet Estimate Appears normal     Differential Method Automated    Comprehensive metabolic panel    Collection Time: 12/27/21  8:55 AM   Result Value Ref Range    Sodium 138 136 - 145 mmol/L    Potassium 3.6 3.5 - 5.1 mmol/L    Chloride 101 95 - 110 mmol/L    CO2 23 23 - 29 mmol/L    Glucose 104 70 - 110 mg/dL    BUN 8 6 - 20 mg/dL    Creatinine 0.7 0.5 - 1.4 mg/dL    Calcium 9.0 8.7 - 10.5 mg/dL    Total Protein 7.6 6.0 - 8.4 g/dL    Albumin 3.0 (L) 3.5 - 5.2 g/dL    Total Bilirubin 0.6 0.1 - 1.0 mg/dL    Alkaline Phosphatase 207 (H) 55 - 135 U/L    AST 50 (H) 10 - 40 U/L    ALT 45 (H) 10 - 44 U/L    Anion Gap 14 8 - 16 mmol/L    eGFR if African American >60.0 >60 mL/min/1.73 m^2    eGFR if non African American >60.0 >60 mL/min/1.73 m^2   Troponin I    Collection Time: 12/27/21  8:55 AM   Result Value Ref Range    Troponin I <0.006 0.000 - 0.026  ng/mL   POCT Influenza A/B Molecular    Collection Time: 12/27/21  9:08 AM   Result Value Ref Range    POC Molecular Influenza A Ag Negative Negative, Not Reported    POC Molecular Influenza B Ag Negative Negative, Not Reported     Acceptable Yes    POCT COVID-19 Rapid Screening    Collection Time: 12/27/21  9:08 AM   Result Value Ref Range    POC Rapid COVID Negative Negative     Acceptable Yes    Urinalysis, Reflex to Urine Culture Urine, Clean Catch    Collection Time: 12/27/21  9:53 AM    Specimen: Urine   Result Value Ref Range    Specimen UA Urine, Clean Catch     Color, UA Yellow Yellow, Straw, Mikki    Appearance, UA Clear Clear    pH, UA 6.0 5.0 - 8.0    Specific Gravity, UA <=1.005 (A) 1.005 - 1.030    Protein, UA Negative Negative    Glucose, UA Negative Negative    Ketones, UA 2+ (A) Negative    Bilirubin (UA) Negative Negative    Occult Blood UA Negative Negative    Nitrite, UA Negative Negative    Urobilinogen, UA Negative <2.0 EU/dL    Leukocytes, UA Negative Negative   Rapid Pregnancy, Urine    Collection Time: 12/27/21  9:54 AM   Result Value Ref Range    Preg Test, Ur Negative        Medications   azithromycin 500 mg in dextrose 5 % 250 mL IVPB (ready to mix system) (has no administration in time range)   cefTRIAXone (ROCEPHIN) 1 g/50 mL D5W IVPB (has no administration in time range)   lactated ringers bolus 1,000 mL (has no administration in time range)   magnesium sulfate in dextrose IVPB (premix) 1 g (has no administration in time range)     Followed by   magnesium sulfate in dextrose IVPB (premix) 1 g (has no administration in time range)   albuterol-ipratropium 2.5 mg-0.5 mg/3 mL nebulizer solution 3 mL (3 mLs Nebulization Given 12/27/21 0858)   methylPREDNISolone sodium succinate injection 125 mg (125 mg Intravenous Given 12/27/21 0922)       I discussed the patient's case with Alissa Henry NP, who accepts the patient for admission and recommends PCR COVID-19  testing.      Admitting MD:  Dr. Ray  Admitting service:  Hospital Medicine   Admission type:  Obs tele      Clinical Impression:   Final diagnoses:  [R06.02] SOB (shortness of breath)  [J96.01] Acute respiratory failure with hypoxia (Primary)  [J45.901] Severe asthma with acute exacerbation, unspecified whether persistent  [J18.9] Atypical pneumonia  [Z20.822] Suspected COVID-19 virus infection          ED Disposition Condition    Admit               Bhanu Abad MD  12/27/21 1047

## 2021-12-27 NOTE — ASSESSMENT & PLAN NOTE
Hypoxia secondary to pneumonia  Suspected to be viral in nature - COVID PCR pending  Normal WBC  Procalcitonin and BNP pending  Sputum culture  Supplemental oxygen to maintain sats > 92 %  Incentive spirometer  CTA of Chest pending

## 2021-12-27 NOTE — HPI
Yanira Medina is a 46 y.o. female who presents with 2-3 days of gradual onset, worsening, severe SOB with associated chest tightness and cough at home.  Multiple family members sick with viral/URI symptoms and one tested positive for COVID-19. Pt explains approx 3 days ago she developed sinus symptoms, severe headache, chills, sweats and had 1 episode of severe vomiting. The symptoms commenced to cough and SOB. Pt denies any recorded fevers. She notes SOB with exertion but reports feeling better as her headache has resolved. In the ED, her pulse ox was 86 % with exercise. COVID rapid screen was negative. COVID marker labs are pending. CXR - scattered pulmonary infiltrates. CTA of Chest is pending for further clarification and to rule out pulmonary embolism. Currently, pt is requiring 3 to 4 liters of oxygen via nasal cannula. Pt denies admission to hospital for asthma exacerbation.   CBC notes a left shift at 90 %, Alk Phos 207, AST 50, ALT 45.  COVID screen was negative  As clarification, on 12/27/2021 patient should be admitted for hospital observation services under my care in collaboration with Indu Freeman MD    Admitting diagnoses include pneumonia and hypoxic respiratory failure.

## 2021-12-27 NOTE — ASSESSMENT & PLAN NOTE
Suspected to be viral in nature - COVID PCR pending  Normal WBC  Procalcitonin and BNP pending  Sputum culture  Supplemental oxygen to maintain sats > 92 %  Incentive spirometer  COVID inflammatory markers pending  CTA of Chest pending  Albuterol inhaler  Will start corticosteroids and or antibiotics pending results of procalcitonin and CT of chest

## 2021-12-27 NOTE — RESPIRATORY THERAPY
Performed ambulatory walk on pt. Pt O2 saturation before walk was 92% and heart rate was 90 bpm. During walk her O2 dropped to 87% and heart rate was 92 bpm.

## 2021-12-27 NOTE — SUBJECTIVE & OBJECTIVE
Past Medical History:   Diagnosis Date    Asthma     History of viral warts     warts to fingers    Thyroid disease        Past Surgical History:   Procedure Laterality Date    CHOLECYSTECTOMY      HYSTERECTOMY         Review of patient's allergies indicates:  No Known Allergies    No current facility-administered medications on file prior to encounter.     Current Outpatient Medications on File Prior to Encounter   Medication Sig    PROAIR HFA 90 mcg/actuation inhaler INHALE 2 PUFFS INTO THE LUNGS EVERY 4 HOURS AS NEEDED    SYNTHROID 125 mcg tablet Take 125 mcg by mouth once daily.    ferrous sulfate 325 mg (65 mg iron) Tab tablet Take 325 mg by mouth.    naproxen sodium (ANAPROX) 550 MG tablet      Family History     Problem Relation (Age of Onset)    Diabetes Mother    Hypertension Mother        Tobacco Use    Smoking status: Former Smoker     Packs/day: 0.30     Types: Cigarettes     Quit date: 2013     Years since quittin.4    Smokeless tobacco: Never Used   Substance and Sexual Activity    Alcohol use: No    Drug use: No    Sexual activity: Yes     Partners: Male     Comment:      Review of Systems   Constitutional: Positive for activity change, chills and diaphoresis.   HENT: Positive for congestion.    Eyes: Negative.    Respiratory: Positive for cough, chest tightness and shortness of breath. Negative for wheezing.    Cardiovascular: Negative for chest pain, palpitations and leg swelling.   Gastrointestinal: Positive for vomiting. Negative for abdominal pain and nausea. Diarrhea: one episode - large volume.   Genitourinary: Negative.    Musculoskeletal: Negative.    Skin: Negative.    Neurological: Positive for headaches.   Psychiatric/Behavioral: Negative for confusion.     Objective:     Vital Signs (Most Recent):  Temp: 98.4 °F (36.9 °C) (21 1356)  Pulse: 98 (21 1707)  Resp: (!) 22 (21 1356)  BP: 136/65 (21 1356)  SpO2: (!) 93 % (21 1700) Vital  Signs (24h Range):  Temp:  [98.1 °F (36.7 °C)-98.7 °F (37.1 °C)] 98.4 °F (36.9 °C)  Pulse:  [] 98  Resp:  [18-33] 22  SpO2:  [82 %-94 %] 93 %  BP: (121-141)/(60-72) 136/65     Weight: 92.8 kg (204 lb 7.6 oz)  Body mass index is 33 kg/m².    Physical Exam  Vitals and nursing note reviewed.   Constitutional:       Appearance: Normal appearance. She is well-developed and well-nourished.   HENT:      Head: Normocephalic and atraumatic.      Nose: Nose normal.      Mouth/Throat:      Mouth: Oropharynx is clear and moist.   Eyes:      Conjunctiva/sclera: Conjunctivae normal.   Cardiovascular:      Rate and Rhythm: Normal rate and regular rhythm.      Heart sounds: Normal heart sounds. No murmur heard.  No friction rub. No gallop.    Pulmonary:      Effort: Pulmonary effort is normal. No respiratory distress.      Breath sounds: Examination of the right-lower field reveals rales. Examination of the left-lower field reveals rales. Rales present.   Abdominal:      General: Bowel sounds are normal. There is no distension.      Palpations: Abdomen is soft.      Tenderness: There is no abdominal tenderness.   Musculoskeletal:         General: No tenderness or edema. Normal range of motion.      Cervical back: Normal range of motion and neck supple.   Skin:     General: Skin is warm and dry.   Neurological:      Mental Status: She is alert and oriented to person, place, and time.   Psychiatric:         Mood and Affect: Mood and affect normal.         Behavior: Behavior normal.             Significant Labs:   All pertinent labs within the past 24 hours have been reviewed.  CBC:   Recent Labs   Lab 12/27/21  0855   WBC 7.64   HGB 12.5   HCT 37.2        CMP:   Recent Labs   Lab 12/27/21  0855      K 3.6      CO2 23      BUN 8   CREATININE 0.7   CALCIUM 9.0   PROT 7.6   ALBUMIN 3.0*   BILITOT 0.6   ALKPHOS 207*   AST 50*   ALT 45*   ANIONGAP 14   EGFRNONAA >60.0       Significant Imaging: I have  reviewed all pertinent imaging results/findings within the past 24 hours.

## 2021-12-28 PROBLEM — Z14.8 ALPHA-1-ANTITRYPSIN DEFICIENCY CARRIER: Status: ACTIVE | Noted: 2019-09-16

## 2021-12-28 PROBLEM — J45.20 MILD INTERMITTENT ASTHMA WITHOUT COMPLICATION: Status: ACTIVE | Noted: 2019-03-14

## 2021-12-28 PROBLEM — Z20.822 SUSPECTED COVID-19 VIRUS INFECTION: Status: ACTIVE | Noted: 2021-12-28

## 2021-12-28 PROBLEM — J96.01 ACUTE HYPOXEMIC RESPIRATORY FAILURE: Status: ACTIVE | Noted: 2021-12-28

## 2021-12-28 LAB
ALBUMIN SERPL BCP-MCNC: 3 G/DL (ref 3.5–5.2)
ALLENS TEST: ABNORMAL
ALP SERPL-CCNC: 223 U/L (ref 55–135)
ALT SERPL W/O P-5'-P-CCNC: 38 U/L (ref 10–44)
ANION GAP SERPL CALC-SCNC: 13 MMOL/L (ref 8–16)
AST SERPL-CCNC: 41 U/L (ref 10–40)
BASOPHILS # BLD AUTO: 0 K/UL (ref 0–0.2)
BASOPHILS NFR BLD: 0 % (ref 0–1.9)
BILIRUB SERPL-MCNC: 0.2 MG/DL (ref 0.1–1)
BUN SERPL-MCNC: 11 MG/DL (ref 6–20)
CALCIUM SERPL-MCNC: 9.3 MG/DL (ref 8.7–10.5)
CHLORIDE SERPL-SCNC: 105 MMOL/L (ref 95–110)
CO2 SERPL-SCNC: 27 MMOL/L (ref 23–29)
CREAT SERPL-MCNC: 0.6 MG/DL (ref 0.5–1.4)
CRP SERPL-MCNC: 317.9 MG/L (ref 0–3.19)
DIFFERENTIAL METHOD: ABNORMAL
EOSINOPHIL # BLD AUTO: 0 K/UL (ref 0–0.5)
EOSINOPHIL NFR BLD: 0 % (ref 0–8)
ERYTHROCYTE [DISTWIDTH] IN BLOOD BY AUTOMATED COUNT: 12.6 % (ref 11.5–14.5)
EST. GFR  (AFRICAN AMERICAN): >60 ML/MIN/1.73 M^2
EST. GFR  (NON AFRICAN AMERICAN): >60 ML/MIN/1.73 M^2
GLUCOSE SERPL-MCNC: 113 MG/DL (ref 70–110)
HBV CORE AB SERPL QL IA: NEGATIVE
HBV SURFACE AG SERPL QL IA: NEGATIVE
HCO3 UR-SCNC: 28.9 MMOL/L (ref 24–28)
HCT VFR BLD AUTO: 36 % (ref 37–48.5)
HGB BLD-MCNC: 11.7 G/DL (ref 12–16)
IMM GRANULOCYTES # BLD AUTO: 0.04 K/UL (ref 0–0.04)
IMM GRANULOCYTES NFR BLD AUTO: 0.5 % (ref 0–0.5)
LYMPHOCYTES # BLD AUTO: 0.8 K/UL (ref 1–4.8)
LYMPHOCYTES NFR BLD: 10.7 % (ref 18–48)
MCH RBC QN AUTO: 31.6 PG (ref 27–31)
MCHC RBC AUTO-ENTMCNC: 32.5 G/DL (ref 32–36)
MCV RBC AUTO: 97 FL (ref 82–98)
MONOCYTES # BLD AUTO: 0.3 K/UL (ref 0.3–1)
MONOCYTES NFR BLD: 4.5 % (ref 4–15)
NEUTROPHILS # BLD AUTO: 6.3 K/UL (ref 1.8–7.7)
NEUTROPHILS NFR BLD: 84.3 % (ref 38–73)
NRBC BLD-RTO: 0 /100 WBC
PCO2 BLDA: 39.4 MMHG (ref 35–45)
PH SMN: 7.47 [PH] (ref 7.35–7.45)
PLATELET # BLD AUTO: 235 K/UL (ref 150–450)
PLATELET BLD QL SMEAR: ABNORMAL
PMV BLD AUTO: 9.7 FL (ref 9.2–12.9)
PO2 BLDA: 51 MMHG (ref 80–100)
POC BE: 5 MMOL/L
POC SATURATED O2: 88 % (ref 95–100)
POTASSIUM SERPL-SCNC: 4.1 MMOL/L (ref 3.5–5.1)
PROT SERPL-MCNC: 7.3 G/DL (ref 6–8.4)
RBC # BLD AUTO: 3.7 M/UL (ref 4–5.4)
SAMPLE: ABNORMAL
SITE: ABNORMAL
SODIUM SERPL-SCNC: 145 MMOL/L (ref 136–145)
WBC # BLD AUTO: 7.41 K/UL (ref 3.9–12.7)

## 2021-12-28 PROCEDURE — 85025 COMPLETE CBC W/AUTO DIFF WBC: CPT | Performed by: NURSE PRACTITIONER

## 2021-12-28 PROCEDURE — 80053 COMPREHEN METABOLIC PANEL: CPT | Performed by: NURSE PRACTITIONER

## 2021-12-28 PROCEDURE — 82803 BLOOD GASES ANY COMBINATION: CPT

## 2021-12-28 PROCEDURE — 27100171 HC OXYGEN HIGH FLOW UP TO 24 HOURS

## 2021-12-28 PROCEDURE — 25000242 PHARM REV CODE 250 ALT 637 W/ HCPCS: Performed by: NURSE PRACTITIONER

## 2021-12-28 PROCEDURE — 86704 HEP B CORE ANTIBODY TOTAL: CPT | Performed by: NURSE PRACTITIONER

## 2021-12-28 PROCEDURE — 94640 AIRWAY INHALATION TREATMENT: CPT

## 2021-12-28 PROCEDURE — 27000207 HC ISOLATION

## 2021-12-28 PROCEDURE — 36600 WITHDRAWAL OF ARTERIAL BLOOD: CPT

## 2021-12-28 PROCEDURE — 63600175 PHARM REV CODE 636 W HCPCS: Performed by: NURSE PRACTITIONER

## 2021-12-28 PROCEDURE — 99223 1ST HOSP IP/OBS HIGH 75: CPT | Mod: ,,, | Performed by: INTERNAL MEDICINE

## 2021-12-28 PROCEDURE — 87340 HEPATITIS B SURFACE AG IA: CPT | Performed by: NURSE PRACTITIONER

## 2021-12-28 PROCEDURE — 21400001 HC TELEMETRY ROOM

## 2021-12-28 PROCEDURE — 99223 PR INITIAL HOSPITAL CARE,LEVL III: ICD-10-PCS | Mod: ,,, | Performed by: INTERNAL MEDICINE

## 2021-12-28 PROCEDURE — 25000003 PHARM REV CODE 250: Performed by: NURSE PRACTITIONER

## 2021-12-28 PROCEDURE — U0003 INFECTIOUS AGENT DETECTION BY NUCLEIC ACID (DNA OR RNA); SEVERE ACUTE RESPIRATORY SYNDROME CORONAVIRUS 2 (SARS-COV-2) (CORONAVIRUS DISEASE [COVID-19]), AMPLIFIED PROBE TECHNIQUE, MAKING USE OF HIGH THROUGHPUT TECHNOLOGIES AS DESCRIBED BY CMS-2020-01-R: HCPCS | Performed by: INTERNAL MEDICINE

## 2021-12-28 PROCEDURE — 99900035 HC TECH TIME PER 15 MIN (STAT)

## 2021-12-28 PROCEDURE — 94761 N-INVAS EAR/PLS OXIMETRY MLT: CPT

## 2021-12-28 PROCEDURE — 94799 UNLISTED PULMONARY SVC/PX: CPT

## 2021-12-28 PROCEDURE — 36415 COLL VENOUS BLD VENIPUNCTURE: CPT | Performed by: NURSE PRACTITIONER

## 2021-12-28 PROCEDURE — 86480 TB TEST CELL IMMUN MEASURE: CPT | Performed by: NURSE PRACTITIONER

## 2021-12-28 PROCEDURE — U0005 INFEC AGEN DETEC AMPLI PROBE: HCPCS | Performed by: INTERNAL MEDICINE

## 2021-12-28 RX ORDER — DOXYCYCLINE HYCLATE 100 MG
100 TABLET ORAL EVERY 12 HOURS
Status: DISCONTINUED | OUTPATIENT
Start: 2021-12-28 | End: 2021-12-28

## 2021-12-28 RX ORDER — IBUPROFEN 200 MG
16 TABLET ORAL
Status: DISCONTINUED | OUTPATIENT
Start: 2021-12-28 | End: 2022-01-08 | Stop reason: HOSPADM

## 2021-12-28 RX ORDER — GLUCAGON 1 MG
1 KIT INJECTION
Status: DISCONTINUED | OUTPATIENT
Start: 2021-12-28 | End: 2022-01-08 | Stop reason: HOSPADM

## 2021-12-28 RX ORDER — ASCORBIC ACID 500 MG
500 TABLET ORAL 2 TIMES DAILY
Status: DISCONTINUED | OUTPATIENT
Start: 2021-12-28 | End: 2022-01-08 | Stop reason: HOSPADM

## 2021-12-28 RX ORDER — ALBUTEROL SULFATE 90 UG/1
2 AEROSOL, METERED RESPIRATORY (INHALATION) EVERY 6 HOURS
Status: DISCONTINUED | OUTPATIENT
Start: 2021-12-28 | End: 2022-01-08 | Stop reason: HOSPADM

## 2021-12-28 RX ORDER — SODIUM CHLORIDE 0.9 % (FLUSH) 0.9 %
10 SYRINGE (ML) INJECTION
Status: DISCONTINUED | OUTPATIENT
Start: 2021-12-28 | End: 2022-01-08 | Stop reason: HOSPADM

## 2021-12-28 RX ORDER — ENOXAPARIN SODIUM 100 MG/ML
1 INJECTION SUBCUTANEOUS 2 TIMES DAILY
Status: DISCONTINUED | OUTPATIENT
Start: 2021-12-28 | End: 2022-01-08 | Stop reason: HOSPADM

## 2021-12-28 RX ORDER — IBUPROFEN 200 MG
24 TABLET ORAL
Status: DISCONTINUED | OUTPATIENT
Start: 2021-12-28 | End: 2022-01-08 | Stop reason: HOSPADM

## 2021-12-28 RX ADMIN — Medication 6 MG: at 10:12

## 2021-12-28 RX ADMIN — ALBUTEROL SULFATE 2 PUFF: 90 AEROSOL, METERED RESPIRATORY (INHALATION) at 07:12

## 2021-12-28 RX ADMIN — THERA TABS 1 TABLET: TAB at 10:12

## 2021-12-28 RX ADMIN — OXYCODONE HYDROCHLORIDE AND ACETAMINOPHEN 500 MG: 500 TABLET ORAL at 10:12

## 2021-12-28 RX ADMIN — ALBUTEROL SULFATE 2 PUFF: 90 AEROSOL, METERED RESPIRATORY (INHALATION) at 12:12

## 2021-12-28 RX ADMIN — ACETAMINOPHEN 650 MG: 325 TABLET ORAL at 11:12

## 2021-12-28 RX ADMIN — ALBUTEROL SULFATE 2 PUFF: 90 AEROSOL, METERED RESPIRATORY (INHALATION) at 11:12

## 2021-12-28 RX ADMIN — ALBUTEROL SULFATE 2 PUFF: 90 AEROSOL, METERED RESPIRATORY (INHALATION) at 06:12

## 2021-12-28 RX ADMIN — ENOXAPARIN SODIUM 90 MG: 100 INJECTION SUBCUTANEOUS at 10:12

## 2021-12-28 RX ADMIN — ACETAMINOPHEN 650 MG: 325 TABLET ORAL at 10:12

## 2021-12-28 RX ADMIN — DEXAMETHASONE 6 MG: 4 TABLET ORAL at 10:12

## 2021-12-28 NOTE — ASSESSMENT & PLAN NOTE
Dexamethasone  REmdesivir  Add Barcitruinib if declines  Repeat Covid PCR  Isolation  Vacination on recovery

## 2021-12-28 NOTE — NURSING
Pt was o2 sat was 75% on 10L high flow. RT in room and pt was increased to 15 high flow nasal cannula and sat increased to 92 %

## 2021-12-28 NOTE — SUBJECTIVE & OBJECTIVE
Interval History: Pt describes chest tightness, cough. SOB with exertion.     Review of Systems   Constitutional: Positive for activity change. Negative for chills and diaphoresis.   HENT: Negative for congestion.    Eyes: Negative.    Respiratory: Positive for cough, chest tightness and shortness of breath. Negative for wheezing.    Cardiovascular: Negative for chest pain, palpitations and leg swelling.   Gastrointestinal: Negative for abdominal pain, nausea and vomiting. Diarrhea: one episode - large volume.   Genitourinary: Negative.    Musculoskeletal: Negative.    Skin: Negative.    Neurological: Negative for headaches.   Psychiatric/Behavioral: Negative for confusion.     Objective:     Vital Signs (Most Recent):  Temp: 98.1 °F (36.7 °C) (12/28/21 1118)  Pulse: 76 (12/28/21 1615)  Resp: 18 (12/28/21 1615)  BP: (!) 109/55 (12/28/21 1118)  SpO2: (!) 91 % (12/28/21 1615) Vital Signs (24h Range):  Temp:  [97.5 °F (36.4 °C)-99.9 °F (37.7 °C)] 98.1 °F (36.7 °C)  Pulse:  [69-98] 76  Resp:  [16-20] 18  SpO2:  [75 %-95 %] 91 %  BP: (108-132)/(55-70) 109/55     Weight: 92.7 kg (204 lb 5.9 oz)  Body mass index is 32.01 kg/m².  No intake or output data in the 24 hours ending 12/28/21 1646   Physical Exam  Vitals and nursing note reviewed.   Constitutional:       Appearance: Normal appearance. She is well-developed.   HENT:      Head: Normocephalic and atraumatic.      Nose: Nose normal.   Eyes:      Conjunctiva/sclera: Conjunctivae normal.   Cardiovascular:      Rate and Rhythm: Regular rhythm. Tachycardia present.      Heart sounds: Normal heart sounds. No murmur heard.  No friction rub. No gallop.    Pulmonary:      Effort: Pulmonary effort is normal. No respiratory distress.      Breath sounds: Examination of the right-lower field reveals rales. Examination of the left-lower field reveals rales. Rales present.   Abdominal:      General: Bowel sounds are normal. There is no distension.      Palpations: Abdomen is soft.       Tenderness: There is no abdominal tenderness.   Musculoskeletal:         General: No tenderness. Normal range of motion.      Cervical back: Normal range of motion and neck supple.   Skin:     General: Skin is warm and dry.   Neurological:      Mental Status: She is alert and oriented to person, place, and time.   Psychiatric:         Behavior: Behavior normal.         Significant Labs:   All pertinent labs within the past 24 hours have been reviewed.  CBC:   Recent Labs   Lab 12/27/21  0855 12/28/21  0517   WBC 7.64 7.41   HGB 12.5 11.7*   HCT 37.2 36.0*    235     CMP:   Recent Labs   Lab 12/27/21  0855 12/28/21  0517    145   K 3.6 4.1    105   CO2 23 27    113*   BUN 8 11   CREATININE 0.7 0.6   CALCIUM 9.0 9.3   PROT 7.6 7.3   ALBUMIN 3.0* 3.0*   BILITOT 0.6 0.2   ALKPHOS 207* 223*   AST 50* 41*   ALT 45* 38   ANIONGAP 14 13   EGFRNONAA >60.0 >60       Significant Imaging: I have reviewed all pertinent imaging results/findings within the past 24 hours.

## 2021-12-28 NOTE — ASSESSMENT & PLAN NOTE
Suspected to be viral in nature - COVID PCR pending  Normal WBC  Procalcitonin and BNP normal  Sputum culture  Supplemental oxygen to maintain sats > 92 %  Incentive spirometer  COVID inflammatory markers elevated  CTA of Chest Large volume solid and subsolid pulmonary opacities   Albuterol inhaler

## 2021-12-28 NOTE — HOSPITAL COURSE
12/28: seen and examined: ABG pending  12/29: seen and examined, on venti mask, dexamethasone, AAOx3, Tmax 98.4, declining remdesivir, requesting outpatient monoclonal antibody infusion, explained outpatient criteria for regeneron and need for inpatient management at this time  12/30: seen and examined: overnight events noted, states comfortable with care at [facility] currently, not open to COVID standard of care meds for escalating hypoxemia, sleep prone , inhalers on dexamethasone, ALT and AST rising, Dimer increased taking lovenox, alb has declined  12/31: seen and examined: OOB, Stable O2, CXR somewhat better, US liver Normal  01/01/2022: seen and examined, daughter bedside, T max 98.8F, O2 13 LPM  01/02/2022: seen and examined, daughter bedside, T max 99.1F, O2 HFNC 40 LPM, 100%  01/03/2022: seen and examined, daughter bedside, Tmax 99.4F, O2 HFNC 40LPM, incentive spirometry, in good spirits but reports gradually feeling worse throughout hospital course  01/06/2022: seen and examined, improving, lung sounds with more movement, Tmax 98.1F, O2 HFNC 25LPM

## 2021-12-28 NOTE — CONSULTS
[facility] - Telemetry ([facility])  Pulmonology  Consult Note    Patient Name: Yanira Medina  MRN: [id number]  Admission Date: 2021  Hospital Length of Stay: 0 days  Code Status: Full Code  Attending Physician: Indu Freeman MD  Primary Care Provider: Juvenal White MD   Principal Problem: Acute respiratory insufficiency    [unfilled]  Subjective:     HPI:  Yanira Medina is 46 y.o.  Asked to see for wosening hypoxemic respiratory failure  Admitted : SOB, cough  Multiple family with COVID 19  Not covid vacinated  Asthma seen by Dr Cordero  Also has Aalpa 1 MZ phenotype, FEV1: 100%< deficiency carrier    O2 escalated from 2 LPM now 15 LPM  COVID rapid was -ve  CTA chest -ve for PE,, Semisolid GGO campatible with SARS COVID 19  Elevated CRP, ferritin, LDH  Procal was normal  Remdesivir and Barcimtrinib were ordered and she declined   Worried about liver side effects  Current LFT : mild elevated alkphos      She now agrees to REMDESIVIR and order placed        Past Medical History:   Diagnosis Date    Asthma     History of viral warts     warts to fingers    Thyroid disease        Past Surgical History:   Procedure Laterality Date    CHOLECYSTECTOMY      HYSTERECTOMY         Review of patient's allergies indicates:  No Known Allergies    Family History     Problem Relation (Age of Onset)    Diabetes Mother    Hypertension Mother        Tobacco Use    Smoking status: Former Smoker     Packs/day: 0.30     Types: Cigarettes     Quit date: 2013     Years since quittin.4    Smokeless tobacco: Never Used   Substance and Sexual Activity    Alcohol use: No    Drug use: No    Sexual activity: Yes     Partners: Male     Comment:          Review of Systems   Constitutional: Positive for fatigue.   Respiratory: Positive for cough and shortness of breath.      Objective:     Vital Signs (Most Recent):  Temp: 98.1 °F (36.7 °C) (21 1118)  Pulse: 80 (21 1500)  Resp:  18 (12/28/21 1156)  BP: (!) 109/55 (12/28/21 1118)  SpO2: (!) 91 % (12/28/21 1156) Vital Signs (24h Range):  Temp:  [97.5 °F (36.4 °C)-99.9 °F (37.7 °C)] 98.1 °F (36.7 °C)  Pulse:  [69-98] 80  Resp:  [16-20] 18  SpO2:  [75 %-95 %] 91 %  BP: (108-132)/(55-70) 109/55     Weight: 92.7 kg (204 lb 5.9 oz)  Body mass index is 32.01 kg/m².    No intake or output data in the 24 hours ending 12/28/21 1620    Physical Exam  Vitals and nursing note reviewed.   HENT:      Head: Normocephalic and atraumatic.      Nose: Nose normal.   Eyes:      Extraocular Movements: Extraocular movements intact.      Pupils: Pupils are equal, round, and reactive to light.   Cardiovascular:      Rate and Rhythm: Normal rate and regular rhythm.      Pulses: Normal pulses.      Heart sounds: Normal heart sounds.   Pulmonary:      Breath sounds: Rales present.   Abdominal:      General: Bowel sounds are normal.      Palpations: Abdomen is soft.   Musculoskeletal:         General: Normal range of motion.      Cervical back: Normal range of motion.   Skin:     General: Skin is warm.      Capillary Refill: Capillary refill takes 2 to 3 seconds.   Neurological:      General: No focal deficit present.      Mental Status: She is alert.         Vents:       Lines/Drains/Airways     Peripheral Intravenous Line                 Peripheral IV - Single Lumen 12/27/21 1057 20 G Left Hand 1 day         Peripheral IV - Single Lumen 12/27/21 1834 Left Antecubital <1 day                Significant Labs:    CBC/Anemia Profile:  Recent Labs   Lab 12/27/21  0855 12/27/21  1739 12/28/21  0517   WBC 7.64  --  7.41   HGB 12.5  --  11.7*   HCT 37.2  --  36.0*     --  235   MCV 96  --  97   RDW 12.6  --  12.6   FERRITIN  --  1,084*  --         Chemistries:  Recent Labs   Lab 12/27/21  0855 12/28/21  0517    145   K 3.6 4.1    105   CO2 23 27   BUN 8 11   CREATININE 0.7 0.6   CALCIUM 9.0 9.3   ALBUMIN 3.0* 3.0*   PROT 7.6 7.3   BILITOT 0.6 0.2   ALKPHOS  207* 223*   ALT 45* 38   AST 50* 41*       ABGs: No results for input(s): PH, PCO2, HCO3, POCSATURATED, BE in the last 48 hours.  Cardiac Markers: No results for input(s): CKMB, TROPONINT, MYOGLOBIN in the last 48 hours.  Coagulation: No results for input(s): PT, INR, APTT in the last 48 hours.  Lactic Acid: No results for input(s): LACTATE in the last 48 hours.  POCT Glucose: No results for input(s): POCTGLUCOSE in the last 48 hours.  Respiratory Culture: No results for input(s): GSRESP, RESPIRATORYC in the last 48 hours.  All pertinent labs within the past 24 hours have been reviewed.    Significant Imaging:   I have reviewed all pertinent imaging results/findings within the past 24 hours.     CTA Chest Non Coronary  Narrative: EXAMINATION:  CTA CHEST NON CORONARY    CLINICAL HISTORY:  Pulmonary embolism (PE) suspected, unknown D-dimer;    TECHNIQUE:  Low dose axial images, sagittal and coronal reformations were obtained from the thoracic inlet to the lung bases following the IV administration of 100 mL of Omnipaque 350.  Contrast timing was optimized to evaluate the pulmonary arteries.  MIP images were performed.    COMPARISON:  None    FINDINGS:  Base of Neck: No significant abnormality.    Thoracic soft tissues: Unremarkable.    Aorta: Left-sided aortic arch.  No aneurysm and no significant atherosclerosis    Heart: Small pericardial effusion.    Pulmonary vasculature: Pulmonary arteries are well opacified.  There is no pulmonary thromboembolism.    Gisell/Mediastinum: No pathologic chidi enlargement.    Airways: Patent.    Lungs/Pleura: Large volume solid and subsolid pulmonary opacities primarily concerning for COVID-19 pneumonia.  Other etiologies could be considered in the appropriate clinical circumstance.  No pleural fluid or pneumothorax.    Esophagus: Unremarkable.    Upper Abdomen: No abnormality of the partially imaged upper abdomen.    Bones: No acute fracture. No suspicious lytic or sclerotic  lesions.  Impression: Large volume solid and subsolid pulmonary opacities primarily concerning for COVID-19 pneumonia.  Other etiologies could be considered in the appropriate clinical circumstance.    No pulmonary thromboembolism.    All CT scans at this facility are performed  using dose modulation techniques as appropriate to performed exam including the following:  automated exposure control; adjustment of mA and/or kV according to the patients size (this includes techniques or standardized protocols for targeted exams where dose is matched to indication/reason for exam: i.e. extremities or head);  iterative reconstruction technique.    Electronically signed by: Reese Crystal  Date:    12/27/2021  Time:    18:55  X-Ray Chest AP Portable  Narrative: EXAMINATION:  XR CHEST AP PORTABLE    CLINICAL HISTORY:  Shortness of breath    FINDINGS:  Single view of the chest.  04/24/2018    Cardiac silhouette is normal.  Scattered pulmonary infiltrates. Findings are not entirely specific but can be seen with multifocal airspace disease.  No evidence of pleural effusion or pneumothorax.  Bones appear intact.  Impression: Scattered pulmonary infiltrates. Findings are not entirely specific but can be seen with multifocal airspace disease.    Electronically signed by: Mateo Wren MD  Date:    12/27/2021  Time:    09:27         ABG  Recent Labs   Lab 12/28/21  1615   PH 7.473*   PO2 51*   PCO2 39.4   HCO3 28.9*   BE 5     Assessment/Plan:     Mild intermittent asthma without complication  Albuterol    Alpha-1-antitrypsin deficiency carrier  MZ phenotype  Normal PFT last office visit    Acute hypoxemic respiratory failure  Patient with Hypoxic Respiratory failure which is Acute.  she is not on home oxygen. Supplemental oxygen was provided and noted-  .   Signs/symptoms of respiratory failure include- tachypnea and increased work of breathing. Contributing diagnoses includes - Pneumonia and SARS covid 19 Labs and images were  reviewed. Patient Has recent ABG, which has been reviewed. Will treat underlying causes and adjust management of respiratory failure   as follows-      NC , keep SpO2 > 92%  Incentive spirometry  Bronchodilators  Home meds ASMANEX    Suspected COVID-19 virus infection  Dexamethasone  REmdesivir  Add Barcitruinib if declines  Repeat Covid PCR  Isolation  Vacination on recovery    Pneumonia  PO DOXYCYCLINE    Patient agrees to remdesivir  Follow LFT's    Remdesivir FDA EUA Verbal Consent  The patient or parent/caregiver has been provided with the remdesivir Fact Sheet for Patients and Parents/Caregivers and has been counseled that the FDA has authorized the emergency use of remdesivir, which is not an FDA approved drug. The significant known and potential risks and benefits are unknown. The patient or parent/caregiver has been given the option to accept or refuse and has verbally agreed to receive remdesivir. Daily labs will be ordered and monitoring for Serious Adverse Events will be performed.        Thank you for your consult. I will follow-up with patient. Please contact us if you have any additional questions.     Ovidio Cannno MD  Pulmonology  [facility] - Telemetry ([facility])

## 2021-12-28 NOTE — HOSPITAL COURSE
Worsening hypoxic respiratory failure overnight. Now on 15 L HFNC. Mild tachypnea but no obvious distress or increased work of breathing. COVID PCR pending. COVID inflammatory markers are elevated. Procalcitonin and BNP negative. CTA of Chest negative for PE but large volue solid and subsolid pulmonary opacities concerning for COVID. Pt with hx of asthma and alpha tripsyn 1. Initially she refused remdesivir infusion but after seen by Dr. Cannon - pt agreeable to receive remdesivir. Strongly suspect COVID pneumonia due to clinical picture and physical exam.   12/29 Pt changed her mind about remdesivir. Also, refused Lovenox therapeutic anticoagulation. Today + COVID screen. Overnight increased oxygen demand due to worsened hypoxia with exertion. Pt wearing both 15 L and venti mask with oxygen at 33 %. SpO2 92 %. Mild tachypnea but no increased work of breathing. CXR worsened. Pt highly motivated with cough, deep breath and use of IS. Explained rationale of remdesevir but pt refuses administration. This was explained by both Nabil Cannon and Xochitl. Decadron and albuterol continued.   12/30 - Pt states she found out that [facility] does not have beds. She is okay with staying here and continuing treatment. She verbalizes understanding of the high level of oxygen requirement. She had an uneventful night and denies any changes in her breathing. Her cough is productive of pale yellow sputum with small amount blood tinged. She is less anxious today - pleasant demeanor. Highly motivated in cough/deep breathing and use of incentive spirometer. Currently on 15 L nasal cannula with  SpO2 95  %. Decadron, albuterol and LMWH in progress. She has mild edema to right foot which patient says is chronic. She denies any leg pain. Slight elevation in liver enzymes from baseline AST 41 >> 89; ALT 38 >> 94.  CRP down; ferritin &  sed rate more elevated.   12/31- Patient OOB, dyspnea improving. HFNC weaned down to 13 L, patient  tolerating well, SpO2 93%. CXR reviewed- mild improvement noted. Continue decadron, albuterol and LMWH. Slight elevation in liver enzymes from baseline AST 41 >> 89; ALT 38 >> 94. No acute findings on abdominal US.   1/1/22 - CXR from 12/31 shows very mild improvement of infiltrates. Today, she describes fatigue. Endorses a productive cough. COVID inflammatory markers are improving. Slight elevation is liver enzymes. Was on 15 L oxygen and now on 13 L oxygen but uses a venti mask on top of the nasal cannula at times. Describes nasal congestion - saline and afrin prescribed.   1/2/22- pt sitting up in bed with Vapotherm in place and reports symptom improvement. Leukocytosis noted in the setting of steroid use. Zinc initiated. Vitamin D level pending.  Plan of care discussed with patient and daughter at bedside and understanding verbalized.   1/3/22- pt sitting in chair and reports compliance with incentive spirometry. Pt remains on Vapotherm with oxygen saturations from 91%-93%. Buspar nightly requested to assist with anxiety.  Pt encouraged to remain in prone position as tolerated with understanding verbalized.  Current plan of care continued as previously prescribed.   On 1/4/22, pt sitting to side of bed with Vapotherm in place and O2 sats 95%-91%.  Will wean as tolerated. Pt encouraged to prone with understanding verbalized.  Current plan of care continued as previously prescribed.   On 1/5/22- pt sitting up in chair and continues to tolerate weaning of Vapotherm.  Social work consulted to assist with discharge planning and LTAC placement and referrals placed.  Pt remains stable and verbalized symptom improvement with no signs of acute distress.  Current plan of care continued as previously prescribed.   On 1/6/22, pt stable on Vapotherm with weaning continued as tolerated.  LTAC placement in progress.    1/7/22-inflammatory markers from 1/6/22 are stable. CRP, LDH, and SED continues to trend down. Ferritin  slightly higher, but not a significant increase. Has been stable on 25L vapotherm. Noted slight increase in leukocytosis. Awaiting authorization for [facility].  1/8/22-patient is now down to 15/75 via vapotherm today. Has been accepted to [facility]. She is stable for discharge.

## 2021-12-28 NOTE — ASSESSMENT & PLAN NOTE
Patient with Hypoxic Respiratory failure which is Acute.  she is not on home oxygen. Supplemental oxygen was provided and noted-  .   Signs/symptoms of respiratory failure include- tachypnea and increased work of breathing. Contributing diagnoses includes - Pneumonia and SARS covid 19 Labs and images were reviewed. Patient Has recent ABG, which has been reviewed. Will treat underlying causes and adjust management of respiratory failure   as follows-      NC , keep SpO2 > 92%  Incentive spirometry  Bronchodilators  Home meds ASMANEX

## 2021-12-28 NOTE — PLAN OF CARE
Pt is AOX4. IV intact. Complaints of pain. Pain med adminstered. Safety measures in place. Pt was O2 was desat at 75 % this morning, respiratory at bedside and pt was increased to 15 L high flow nasal cannula. O2 monitoring. Tele monitor on.. POC reviewed with pt. Pt verbalizes understanding. Will continue to monitor.

## 2021-12-28 NOTE — HPI
Yanira Medina is 46 y.o.  Asked to see for wosening hypoxemic respiratory failure  Admitted 12/27: SOB, cough  Multiple family with COVID 19  Not covid vacinated  Asthma seen by Dr Cordero  Also has Aalpa 1 MZ phenotype, FEV1: 100%< deficiency carrier    O2 escalated from 2 LPM now 15 LPM  COVID rapid was -ve  CTA chest -ve for PE,, Semisolid GGO campatible with SARS COVID 19  Elevated CRP, ferritin, LDH  Procal was normal  Remdesivir and Barcimtrinib were ordered and she declined   Worried about liver side effects  Current LFT : mild elevated alkphos      She now agrees to REMDESIVIR and order placed

## 2021-12-28 NOTE — PLAN OF CARE
Ongoing (interventions implemented as appropriate)   Ox4   Pt able to make needs known.  Pt remained afebrile throughout this shift.   Pt remained free of falls this shift.   Pt has lower back back given tylenol   Plan of care reviewed. Patient verbalizes understanding.   Pt moving/turing independent. Frequent weight shifting encouraged.  Patient sinus rhythm on monitor.   Bed low, side rails up x 2, wheels locked, call light in reach.   Hourly rounding completed.   Will continue to monitor.   COVID PCR pending results   Normal WBC  Sputum culture pending to collect  10L high flow o2  Incentive spirometer

## 2021-12-28 NOTE — H&P
[facility] Medicine  History & Physical    Patient Name: Yanira Medina  MRN: [id number]  Patient Class: OP- Observation  Admission Date: 12/27/2021  Attending Physician: Indu Freeman MD   Primary Care Provider: Juvenal White MD         Patient information was obtained from patient and ER records.     Subjective:     Principal Problem:Acute respiratory insufficiency    Chief Complaint:   Chief Complaint   Patient presents with    Shortness of Breath        HPI: Yanira Medina is a 46 y.o. female who presents with 2-3 days of gradual onset, worsening, severe SOB with associated chest tightness and cough at home.  Multiple family members sick with viral/URI symptoms and one tested positive for COVID-19. Pt explains approx 3 days ago she developed sinus symptoms, severe headache, chills, sweats and had 1 episode of severe vomiting. The symptoms commenced to cough and SOB. Pt denies any recorded fevers. She notes SOB with exertion but reports feeling better as her headache has resolved. In the ED, her pulse ox was 86 % with exercise. COVID rapid screen was negative. COVID marker labs are pending. CXR - scattered pulmonary infiltrates. CTA of Chest is pending for further clarification and to rule out pulmonary embolism. Currently, pt is requiring 3 to 4 liters of oxygen via nasal cannula. Pt denies admission to hospital for asthma exacerbation.   CBC notes a left shift at 90 %, Alk Phos 207, AST 50, ALT 45.  COVID screen was negative  As clarification, on 12/27/2021 patient should be admitted for hospital observation services under my care in collaboration with Indu Freeman MD    Admitting diagnoses include pneumonia and hypoxic respiratory failure.          Past Medical History:   Diagnosis Date    Asthma     History of viral warts     warts to fingers    Thyroid disease        Past Surgical History:   Procedure Laterality Date    CHOLECYSTECTOMY       HYSTERECTOMY         Review of patient's allergies indicates:  No Known Allergies    No current facility-administered medications on file prior to encounter.     Current Outpatient Medications on File Prior to Encounter   Medication Sig    PROAIR HFA 90 mcg/actuation inhaler INHALE 2 PUFFS INTO THE LUNGS EVERY 4 HOURS AS NEEDED    SYNTHROID 125 mcg tablet Take 125 mcg by mouth once daily.    ferrous sulfate 325 mg (65 mg iron) Tab tablet Take 325 mg by mouth.    naproxen sodium (ANAPROX) 550 MG tablet      Family History     Problem Relation (Age of Onset)    Diabetes Mother    Hypertension Mother        Tobacco Use    Smoking status: Former Smoker     Packs/day: 0.30     Types: Cigarettes     Quit date: 2013     Years since quittin.4    Smokeless tobacco: Never Used   Substance and Sexual Activity    Alcohol use: No    Drug use: No    Sexual activity: Yes     Partners: Male     Comment:      Review of Systems   Constitutional: Positive for activity change, chills and diaphoresis.   HENT: Positive for congestion.    Eyes: Negative.    Respiratory: Positive for cough, chest tightness and shortness of breath. Negative for wheezing.    Cardiovascular: Negative for chest pain, palpitations and leg swelling.   Gastrointestinal: Positive for vomiting - one episode of large volume. Negative for abdominal pain and nausea.   Genitourinary: Negative.    Musculoskeletal: Negative.    Skin: Negative.    Neurological: Positive for headaches.   Psychiatric/Behavioral: Negative for confusion.     Objective:     Vital Signs (Most Recent):  Temp: 98.4 °F (36.9 °C) (21 1356)  Pulse: 98 (21 1707)  Resp: (!) 22 (21 1356)  BP: 136/65 (21 1356)  SpO2: (!) 93 % (21 1700) Vital Signs (24h Range):  Temp:  [98.1 °F (36.7 °C)-98.7 °F (37.1 °C)] 98.4 °F (36.9 °C)  Pulse:  [] 98  Resp:  [18-33] 22  SpO2:  [82 %-94 %] 93 %  BP: (121-141)/(60-72) 136/65     Weight: 92.8 kg (204 lb 7.6  oz)  Body mass index is 33 kg/m².    Physical Exam  Vitals and nursing note reviewed.   Constitutional:       Appearance: Normal appearance. She is well-developed and well-nourished.   HENT:      Head: Normocephalic and atraumatic.      Nose: Nose normal.      Mouth/Throat:      Mouth: Oropharynx is clear and moist.   Eyes:      Conjunctiva/sclera: Conjunctivae normal.   Cardiovascular:      Rate and Rhythm: Normal rate and regular rhythm.      Heart sounds: Normal heart sounds. No murmur heard.  No friction rub. No gallop.    Pulmonary:      Effort: Pulmonary effort is normal. No respiratory distress.      Breath sounds: Examination of the right-lower field reveals rales. Examination of the left-lower field reveals rales. Rales present.   Abdominal:      General: Bowel sounds are normal. There is no distension.      Palpations: Abdomen is soft.      Tenderness: There is no abdominal tenderness.   Musculoskeletal:         General: No tenderness or edema. Normal range of motion.      Cervical back: Normal range of motion and neck supple.   Skin:     General: Skin is warm and dry.   Neurological:      Mental Status: She is alert and oriented to person, place, and time.   Psychiatric:         Mood and Affect: Mood and affect normal.         Behavior: Behavior normal.             Significant Labs:   All pertinent labs within the past 24 hours have been reviewed.  CBC:   Recent Labs   Lab 12/27/21  0855   WBC 7.64   HGB 12.5   HCT 37.2        CMP:   Recent Labs   Lab 12/27/21  0855      K 3.6      CO2 23      BUN 8   CREATININE 0.7   CALCIUM 9.0   PROT 7.6   ALBUMIN 3.0*   BILITOT 0.6   ALKPHOS 207*   AST 50*   ALT 45*   ANIONGAP 14   EGFRNONAA >60.0       Significant Imaging: I have reviewed all pertinent imaging results/findings within the past 24 hours.    Assessment/Plan:     * Acute respiratory insufficiency  Hypoxia secondary to pneumonia  Suspected to be viral in nature - COVID PCR  pending  Normal WBC  Procalcitonin and BNP pending  Sputum culture  Supplemental oxygen to maintain sats > 92 %  Incentive spirometer  CTA of Chest pending      Pneumonia  Suspected to be viral in nature - COVID PCR pending  Normal WBC  Procalcitonin and BNP pending  Sputum culture  Supplemental oxygen to maintain sats > 92 %  Incentive spirometer  COVID inflammatory markers pending  CTA of Chest pending  Albuterol inhaler  Will start corticosteroids and or antibiotics pending results of procalcitonin and CT of chest        Adult hypothyroidism  Continue synthroid        VTE Risk Mitigation (From admission, onward)         Ordered     enoxaparin injection 40 mg  Daily         12/27/21 1759     IP VTE HIGH RISK PATIENT  Once         12/27/21 1759     Place sequential compression device  Until discontinued         12/27/21 1759                   Deysi Castaneda NP  Department of Hospital Medicine   [facility] - Telemetry ([facility])

## 2021-12-28 NOTE — PLAN OF CARE
[facility] - Telemetry (Hospital)  Initial Discharge Assessment       Primary Care Provider: Juvenal White MD    Admission Diagnosis: SOB (shortness of breath) [R06.02]  Atypical pneumonia [J18.9]  Acute respiratory failure with hypoxia [J96.01]  Severe asthma with acute exacerbation, unspecified whether persistent [J45.901]  Suspected COVID-19 virus infection [Z20.822]    Admission Date: 12/27/2021  Expected Discharge Date:     Discharge Barriers Identified: None    Payor: BLUE CROSS [facility] / Plan: BCBS ALL OUT OF STATE / Product Type: PPO /     Extended Emergency Contact Information  Primary Emergency Contact: Nayana Lopez   [place]  Mobile Phone: [phone]  Relation: Mother    Discharge Plan A: Home with family         [id number] - [place], [place] - [id number] [facility]  [place] [address]  Phone: [phone] Fax: [phone]      Initial Assessment (most recent)     Adult Discharge Assessment - 12/28/21 1434        Discharge Assessment    Assessment Type Discharge Planning Assessment     Confirmed/corrected address, phone number and insurance Yes     Confirmed Demographics Correct on Facesheet     Source of Information family     Communicated SANDRA with patient/caregiver Date not available/Unable to determine     Reason For Admission SOB     Lives With spouse     Facility Arrived From: home     Do you expect to return to your current living situation? Yes     Do you have help at home or someone to help you manage your care at home? Yes     Who are your caregiver(s) and their phone number(s)? independent     Prior to hospitilization cognitive status: Alert/Oriented     Current cognitive status: Alert/Oriented     Walking or Climbing Stairs Difficulty none     Dressing/Bathing Difficulty none     Equipment Currently Used at Home none     Readmission within 30 days? No     Patient currently being followed by outpatient case management? No     Do you  currently have service(s) that help you manage your care at home? No     Do you take prescription medications? Yes     Do you have prescription coverage? Yes     Do you have any problems affording any of your prescribed medications? No     Is the patient taking medications as prescribed? yes     Who is going to help you get home at discharge? spouse-Mateo [phone]     How do you get to doctors appointments? car, drives self     Are you on dialysis? No     Discharge Plan A Home with family     DME Needed Upon Discharge  oxygen     Discharge Plan discussed with: Parent(s)     Discharge Barriers Identified None               Anticipated DC dispo: home with spouse and son  PLOF: independent per mother  PCP: Dr. White  Comments:  CM called and spoke with mother to complete initial assessment. Patient is independent and lives with spouse. Denies use of DME. CM following for needs.    CM provided a transitional care folder, information on advanced directives, information on pharmacy bedside delivery, and discharge planning begins on admission with contact information for any needs/questions.

## 2021-12-28 NOTE — CARE UPDATE
Advised by nurse that patient changed her mind about receiving Remdesivir    She stated she needs to stick to her initial decision.

## 2021-12-28 NOTE — SUBJECTIVE & OBJECTIVE
Past Medical History:   Diagnosis Date    Asthma     History of viral warts     warts to fingers    Thyroid disease        Past Surgical History:   Procedure Laterality Date    CHOLECYSTECTOMY      HYSTERECTOMY         Review of patient's allergies indicates:  No Known Allergies    Family History     Problem Relation (Age of Onset)    Diabetes Mother    Hypertension Mother        Tobacco Use    Smoking status: Former Smoker     Packs/day: 0.30     Types: Cigarettes     Quit date: 2013     Years since quittin.4    Smokeless tobacco: Never Used   Substance and Sexual Activity    Alcohol use: No    Drug use: No    Sexual activity: Yes     Partners: Male     Comment:          Review of Systems   Constitutional: Positive for fatigue.   Respiratory: Positive for cough and shortness of breath.      Objective:     Vital Signs (Most Recent):  Temp: 98.1 °F (36.7 °C) (21 1118)  Pulse: 80 (21 1500)  Resp: 18 (21 1156)  BP: (!) 109/55 (21 1118)  SpO2: (!) 91 % (21 1156) Vital Signs (24h Range):  Temp:  [97.5 °F (36.4 °C)-99.9 °F (37.7 °C)] 98.1 °F (36.7 °C)  Pulse:  [69-98] 80  Resp:  [16-20] 18  SpO2:  [75 %-95 %] 91 %  BP: (108-132)/(55-70) 109/55     Weight: 92.7 kg (204 lb 5.9 oz)  Body mass index is 32.01 kg/m².    No intake or output data in the 24 hours ending 21 1620    Physical Exam  Vitals and nursing note reviewed.   HENT:      Head: Normocephalic and atraumatic.      Nose: Nose normal.   Eyes:      Extraocular Movements: Extraocular movements intact.      Pupils: Pupils are equal, round, and reactive to light.   Cardiovascular:      Rate and Rhythm: Normal rate and regular rhythm.      Pulses: Normal pulses.      Heart sounds: Normal heart sounds.   Pulmonary:      Breath sounds: Rales present.   Abdominal:      General: Bowel sounds are normal.      Palpations: Abdomen is soft.   Musculoskeletal:         General: Normal range of motion.      Cervical  back: Normal range of motion.   Skin:     General: Skin is warm.      Capillary Refill: Capillary refill takes 2 to 3 seconds.   Neurological:      General: No focal deficit present.      Mental Status: She is alert.         Vents:       Lines/Drains/Airways     Peripheral Intravenous Line                 Peripheral IV - Single Lumen 12/27/21 1057 20 G Left Hand 1 day         Peripheral IV - Single Lumen 12/27/21 1834 Left Antecubital <1 day                Significant Labs:    CBC/Anemia Profile:  Recent Labs   Lab 12/27/21  0855 12/27/21  1739 12/28/21  0517   WBC 7.64  --  7.41   HGB 12.5  --  11.7*   HCT 37.2  --  36.0*     --  235   MCV 96  --  97   RDW 12.6  --  12.6   FERRITIN  --  1,084*  --         Chemistries:  Recent Labs   Lab 12/27/21  0855 12/28/21  0517    145   K 3.6 4.1    105   CO2 23 27   BUN 8 11   CREATININE 0.7 0.6   CALCIUM 9.0 9.3   ALBUMIN 3.0* 3.0*   PROT 7.6 7.3   BILITOT 0.6 0.2   ALKPHOS 207* 223*   ALT 45* 38   AST 50* 41*       ABGs: No results for input(s): PH, PCO2, HCO3, POCSATURATED, BE in the last 48 hours.  Cardiac Markers: No results for input(s): CKMB, TROPONINT, MYOGLOBIN in the last 48 hours.  Coagulation: No results for input(s): PT, INR, APTT in the last 48 hours.  Lactic Acid: No results for input(s): LACTATE in the last 48 hours.  POCT Glucose: No results for input(s): POCTGLUCOSE in the last 48 hours.  Respiratory Culture: No results for input(s): GSRESP, RESPIRATORYC in the last 48 hours.  All pertinent labs within the past 24 hours have been reviewed.    Significant Imaging:   I have reviewed all pertinent imaging results/findings within the past 24 hours.     CTA Chest Non Coronary  Narrative: EXAMINATION:  CTA CHEST NON CORONARY    CLINICAL HISTORY:  Pulmonary embolism (PE) suspected, unknown D-dimer;    TECHNIQUE:  Low dose axial images, sagittal and coronal reformations were obtained from the thoracic inlet to the lung bases following the IV  administration of 100 mL of Omnipaque 350.  Contrast timing was optimized to evaluate the pulmonary arteries.  MIP images were performed.    COMPARISON:  None    FINDINGS:  Base of Neck: No significant abnormality.    Thoracic soft tissues: Unremarkable.    Aorta: Left-sided aortic arch.  No aneurysm and no significant atherosclerosis    Heart: Small pericardial effusion.    Pulmonary vasculature: Pulmonary arteries are well opacified.  There is no pulmonary thromboembolism.    Gisell/Mediastinum: No pathologic chidi enlargement.    Airways: Patent.    Lungs/Pleura: Large volume solid and subsolid pulmonary opacities primarily concerning for COVID-19 pneumonia.  Other etiologies could be considered in the appropriate clinical circumstance.  No pleural fluid or pneumothorax.    Esophagus: Unremarkable.    Upper Abdomen: No abnormality of the partially imaged upper abdomen.    Bones: No acute fracture. No suspicious lytic or sclerotic lesions.  Impression: Large volume solid and subsolid pulmonary opacities primarily concerning for COVID-19 pneumonia.  Other etiologies could be considered in the appropriate clinical circumstance.    No pulmonary thromboembolism.    All CT scans at this facility are performed  using dose modulation techniques as appropriate to performed exam including the following:  automated exposure control; adjustment of mA and/or kV according to the patients size (this includes techniques or standardized protocols for targeted exams where dose is matched to indication/reason for exam: i.e. extremities or head);  iterative reconstruction technique.    Electronically signed by: Reese Crystal  Date:    12/27/2021  Time:    18:55  X-Ray Chest AP Portable  Narrative: EXAMINATION:  XR CHEST AP PORTABLE    CLINICAL HISTORY:  Shortness of breath    FINDINGS:  Single view of the chest.  04/24/2018    Cardiac silhouette is normal.  Scattered pulmonary infiltrates. Findings are not entirely specific but can be  seen with multifocal airspace disease.  No evidence of pleural effusion or pneumothorax.  Bones appear intact.  Impression: Scattered pulmonary infiltrates. Findings are not entirely specific but can be seen with multifocal airspace disease.    Electronically signed by: Mateo Wren MD  Date:    12/27/2021  Time:    09:27

## 2021-12-28 NOTE — ASSESSMENT & PLAN NOTE
Patient with Hypoxic Respiratory failure which is Acute.  she is not on home oxygen. Supplemental oxygen was provided and noted-  .   Signs/symptoms of respiratory failure include- tachypnea. Contributing diagnoses includes - asthma Labs and images were reviewed. Patient Has not had a recent ABG. Will treat underlying causes and adjust management of respiratory failure   Hypoxia secondary to pneumonia  Suspected to be viral in nature - COVID PCR pending  Normal WBC  Procalcitonin and BNP are normal  Sputum culture  Supplemental oxygen to maintain sats > 92 %  Incentive spirometer  CTA of Chest - Large volume solid and subsolid pulmonary opacities

## 2021-12-28 NOTE — PROGRESS NOTES
[facility] Medicine  Progress Note    Patient Name: Yanira Medina  MRN: [id number]  Patient Class: IP- Inpatient   Admission Date: 12/27/2021  Length of Stay: 0 days  Attending Physician: Indu Freeman MD  Primary Care Provider: Juvenal White MD        Subjective:     Principal Problem:Suspected COVID-19 virus infection        HPI:  Yanira Medina is a 46 y.o. female who presents with 2-3 days of gradual onset, worsening, severe SOB with associated chest tightness and cough at home.  Multiple family members sick with viral/URI symptoms and one tested positive for COVID-19. Pt explains approx 3 days ago she developed sinus symptoms, severe headache, chills, sweats and had 1 episode of severe vomiting. The symptoms commenced to cough and SOB. Pt denies any recorded fevers. She notes SOB with exertion but reports feeling better as her headache has resolved. In the ED, her pulse ox was 86 % with exercise. COVID rapid screen was negative. COVID marker labs are pending. CXR - scattered pulmonary infiltrates. CTA of Chest is pending for further clarification and to rule out pulmonary embolism. Currently, pt is requiring 3 to 4 liters of oxygen via nasal cannula. Pt denies admission to hospital for asthma exacerbation.   CBC notes a left shift at 90 %, Alk Phos 207, AST 50, ALT 45.  COVID screen was negative  As clarification, on 12/27/2021 patient should be admitted for hospital observation services under my care in collaboration with Indu Freeman MD    Admitting diagnoses include pneumonia and hypoxic respiratory failure.          Overview/Hospital Course:  Worsening hypoxic respiratory failure overnight. Now on 15 L HFNC. Mild tachypnea but no obvious distress or increased work of breathing. COVID PCR pending. COVID inflammatory markers are elevated. Procalcitonin and BNP negative. CTA of Chest negative for PE but large volue solid and subsolid pulmonary opacities  concerning for COVID. Pt with hx of asthma and alpha tripsyn 1. Initially she refused remdesivir infusion but after seen by Dr. Cannon - pt agreeable to receive remdesivir. Strongly suspect COVID pneumonia due to clinical picture and physical exam.       Interval History: Pt describes chest tightness, cough. SOB with exertion.     Review of Systems   Constitutional: Positive for activity change. Negative for chills and diaphoresis.   HENT: Negative for congestion.    Eyes: Negative.    Respiratory: Positive for cough, chest tightness and shortness of breath. Negative for wheezing.    Cardiovascular: Negative for chest pain, palpitations and leg swelling.   Gastrointestinal: Negative for abdominal pain, nausea and vomiting. Diarrhea: one episode - large volume.   Genitourinary: Negative.    Musculoskeletal: Negative.    Skin: Negative.    Neurological: Negative for headaches.   Psychiatric/Behavioral: Negative for confusion.     Objective:     Vital Signs (Most Recent):  Temp: 98.1 °F (36.7 °C) (12/28/21 1118)  Pulse: 76 (12/28/21 1615)  Resp: 18 (12/28/21 1615)  BP: (!) 109/55 (12/28/21 1118)  SpO2: (!) 91 % (12/28/21 1615) Vital Signs (24h Range):  Temp:  [97.5 °F (36.4 °C)-99.9 °F (37.7 °C)] 98.1 °F (36.7 °C)  Pulse:  [69-98] 76  Resp:  [16-20] 18  SpO2:  [75 %-95 %] 91 %  BP: (108-132)/(55-70) 109/55     Weight: 92.7 kg (204 lb 5.9 oz)  Body mass index is 32.01 kg/m².  No intake or output data in the 24 hours ending 12/28/21 1646   Physical Exam  Vitals and nursing note reviewed.   Constitutional:       Appearance: Normal appearance. She is well-developed.   HENT:      Head: Normocephalic and atraumatic.      Nose: Nose normal.   Eyes:      Conjunctiva/sclera: Conjunctivae normal.   Cardiovascular:      Rate and Rhythm: Regular rhythm. Tachycardia present.      Heart sounds: Normal heart sounds. No murmur heard.  No friction rub. No gallop.    Pulmonary:      Effort: Pulmonary effort is normal. No respiratory  distress.      Breath sounds: Examination of the right-lower field reveals rales. Examination of the left-lower field reveals rales. Rales present.   Abdominal:      General: Bowel sounds are normal. There is no distension.      Palpations: Abdomen is soft.      Tenderness: There is no abdominal tenderness.   Musculoskeletal:         General: No tenderness. Normal range of motion.      Cervical back: Normal range of motion and neck supple.   Skin:     General: Skin is warm and dry.   Neurological:      Mental Status: She is alert and oriented to person, place, and time.   Psychiatric:         Behavior: Behavior normal.         Significant Labs:   All pertinent labs within the past 24 hours have been reviewed.  CBC:   Recent Labs   Lab 12/27/21  0855 12/28/21  0517   WBC 7.64 7.41   HGB 12.5 11.7*   HCT 37.2 36.0*    235     CMP:   Recent Labs   Lab 12/27/21  0855 12/28/21  0517    145   K 3.6 4.1    105   CO2 23 27    113*   BUN 8 11   CREATININE 0.7 0.6   CALCIUM 9.0 9.3   PROT 7.6 7.3   ALBUMIN 3.0* 3.0*   BILITOT 0.6 0.2   ALKPHOS 207* 223*   AST 50* 41*   ALT 45* 38   ANIONGAP 14 13   EGFRNONAA >60.0 >60       Significant Imaging: I have reviewed all pertinent imaging results/findings within the past 24 hours.      Assessment/Plan:      * Suspected COVID-19 virus infection        Acute hypoxemic respiratory failure  Patient with Hypoxic Respiratory failure which is Acute.  she is not on home oxygen. Supplemental oxygen was provided and noted-  .   Signs/symptoms of respiratory failure include- tachypnea. Contributing diagnoses includes - asthma Labs and images were reviewed. Patient Has not had a recent ABG. Will treat underlying causes and adjust management of respiratory failure   Hypoxia secondary to pneumonia  Suspected to be viral in nature - COVID PCR pending  Normal WBC  Procalcitonin and BNP are normal  Sputum culture  Supplemental oxygen to maintain sats > 92 %  Incentive  spirometer  CTA of Chest - Large volume solid and subsolid pulmonary opacities       Pneumonia  Suspected to be viral in nature - COVID PCR pending  Normal WBC  Procalcitonin and BNP normal  Sputum culture  Supplemental oxygen to maintain sats > 92 %  Incentive spirometer  COVID inflammatory markers elevated  CTA of Chest Large volume solid and subsolid pulmonary opacities   Albuterol inhaler          Adult hypothyroidism  Continue synthroid        VTE Risk Mitigation (From admission, onward)         Ordered     enoxaparin injection 90 mg  2 times daily         12/28/21 0825     IP VTE HIGH RISK PATIENT  Once         12/27/21 1759     Place sequential compression device  Until discontinued         12/27/21 1759                Discharge Planning   SANDRA:      Code Status: Full Code   Is the patient medically ready for discharge?:     Reason for patient still in hospital (select all that apply): Patient trending condition, Treatment and Consult recommendations  Discharge Plan A: Home with family                  Deysi Castaneda NP  Department of Hospital Medicine   [facility] - Telemetry ([facility])

## 2021-12-29 PROBLEM — U07.1 PNEUMONIA DUE TO COVID-19 VIRUS: Status: ACTIVE | Noted: 2021-12-28

## 2021-12-29 PROBLEM — J12.82 PNEUMONIA DUE TO COVID-19 VIRUS: Status: ACTIVE | Noted: 2021-12-28

## 2021-12-29 LAB
ERYTHROCYTE [SEDIMENTATION RATE] IN BLOOD BY WESTERGREN METHOD: 120 MM/HR (ref 0–20)
FERRITIN SERPL-MCNC: 1878 NG/ML (ref 20–300)
LDH SERPL L TO P-CCNC: 794 U/L (ref 110–260)
SARS-COV-2 RNA RESP QL NAA+PROBE: DETECTED
SARS-COV-2- CYCLE NUMBER: 35

## 2021-12-29 PROCEDURE — 83615 LACTATE (LD) (LDH) ENZYME: CPT | Performed by: NURSE PRACTITIONER

## 2021-12-29 PROCEDURE — 94799 UNLISTED PULMONARY SVC/PX: CPT

## 2021-12-29 PROCEDURE — 63600175 PHARM REV CODE 636 W HCPCS: Performed by: NURSE PRACTITIONER

## 2021-12-29 PROCEDURE — 99233 PR SUBSEQUENT HOSPITAL CARE,LEVL III: ICD-10-PCS | Mod: ,,, | Performed by: INTERNAL MEDICINE

## 2021-12-29 PROCEDURE — 99233 SBSQ HOSP IP/OBS HIGH 50: CPT | Mod: ,,, | Performed by: INTERNAL MEDICINE

## 2021-12-29 PROCEDURE — 82728 ASSAY OF FERRITIN: CPT | Performed by: NURSE PRACTITIONER

## 2021-12-29 PROCEDURE — 25000003 PHARM REV CODE 250: Performed by: NURSE PRACTITIONER

## 2021-12-29 PROCEDURE — 85651 RBC SED RATE NONAUTOMATED: CPT | Performed by: NURSE PRACTITIONER

## 2021-12-29 PROCEDURE — 94760 N-INVAS EAR/PLS OXIMETRY 1: CPT

## 2021-12-29 PROCEDURE — 94640 AIRWAY INHALATION TREATMENT: CPT

## 2021-12-29 PROCEDURE — 36415 COLL VENOUS BLD VENIPUNCTURE: CPT | Performed by: NURSE PRACTITIONER

## 2021-12-29 PROCEDURE — 27100171 HC OXYGEN HIGH FLOW UP TO 24 HOURS

## 2021-12-29 PROCEDURE — 99900035 HC TECH TIME PER 15 MIN (STAT)

## 2021-12-29 PROCEDURE — 86141 C-REACTIVE PROTEIN HS: CPT | Performed by: NURSE PRACTITIONER

## 2021-12-29 PROCEDURE — 27000207 HC ISOLATION

## 2021-12-29 PROCEDURE — 21400001 HC TELEMETRY ROOM

## 2021-12-29 RX ORDER — FAMOTIDINE 20 MG/1
20 TABLET, FILM COATED ORAL 2 TIMES DAILY
Status: DISCONTINUED | OUTPATIENT
Start: 2021-12-29 | End: 2022-01-08 | Stop reason: HOSPADM

## 2021-12-29 RX ADMIN — Medication 6 MG: at 09:12

## 2021-12-29 RX ADMIN — FAMOTIDINE 20 MG: 20 TABLET ORAL at 12:12

## 2021-12-29 RX ADMIN — ALBUTEROL SULFATE 2 PUFF: 90 AEROSOL, METERED RESPIRATORY (INHALATION) at 08:12

## 2021-12-29 RX ADMIN — FAMOTIDINE 20 MG: 20 TABLET ORAL at 09:12

## 2021-12-29 RX ADMIN — OXYCODONE HYDROCHLORIDE AND ACETAMINOPHEN 500 MG: 500 TABLET ORAL at 10:12

## 2021-12-29 RX ADMIN — ACETAMINOPHEN 650 MG: 325 TABLET ORAL at 10:12

## 2021-12-29 RX ADMIN — ACETAMINOPHEN 650 MG: 325 TABLET ORAL at 09:12

## 2021-12-29 RX ADMIN — DEXAMETHASONE 6 MG: 4 TABLET ORAL at 10:12

## 2021-12-29 RX ADMIN — ALBUTEROL SULFATE 2 PUFF: 90 AEROSOL, METERED RESPIRATORY (INHALATION) at 12:12

## 2021-12-29 RX ADMIN — ENOXAPARIN SODIUM 90 MG: 100 INJECTION SUBCUTANEOUS at 09:12

## 2021-12-29 RX ADMIN — THERA TABS 1 TABLET: TAB at 10:12

## 2021-12-29 RX ADMIN — OXYCODONE HYDROCHLORIDE AND ACETAMINOPHEN 500 MG: 500 TABLET ORAL at 09:12

## 2021-12-29 RX ADMIN — ALBUTEROL SULFATE 2 PUFF: 90 AEROSOL, METERED RESPIRATORY (INHALATION) at 02:12

## 2021-12-29 NOTE — CARE UPDATE
Ms. Medina is a 46-year-old female with a past medical history of asthma, alpha-1 antitrypsin deficiency and goiter who was admitted to [facility] in [place] with acute respiratory failure.  Initial rapid COVID test was negative subsequent PCR test was positive.    Patient has been in the hospital 3 days she is declining IV remdesivir stating that she would prefer to receive monoclonal antibody infusion.  Explained to patient FDA guidelines prohibit administering monoclonal antibody to high acuity hospitalized patients.  Patient also met with hospital pulmonologist Dr. Cannon to discuss this issue.    She says she will consider taking remdesivir but for the time being continues to decline.    When asked if she would like to be intubated and placed on a mechanical ventilator in the event of deterioration she stated no.  Later discussion with nurse practitioner Deysi Castaneda patient states that she understands he intubation and mechanical ventilation would be a life-saving measure in the event of deterioration and she now wishes these interventions to be used if she deteriorates.  Patient remains full code.    Indu Freeman MD  [facility] medicine [facility] [place]

## 2021-12-29 NOTE — PLAN OF CARE
Pt is AOX4.   IV intact.   Complaints of back pain. Pain med adminstered.   Safety measures in place.   Pt was O2 was desat at 83 % around 0300 in the morning, pt was using BSC and the pulse ox was on a cold finger. Made her comfortable rechecked the oxygen . Oxygen saturation went to 92%   Refused Lovenox   Tele monitor on..   POC reviewed with pt.  Pt verbalizes understanding.   Will continue to monitor.

## 2021-12-29 NOTE — CARE UPDATE
Pt states that she has advised her  of her worsened condition and that she does not need me to speak with him at this time.    She said she spoke with him about the worsened CXR and the increased oxygen and if she worsened she would need to go on the mechanical ventilator.    Pt stated she wants to live and understands that mechanical ventilation is for lifesaving measures thus is agreeable to intubation in the event of deterioration.

## 2021-12-29 NOTE — ASSESSMENT & PLAN NOTE
Dexamethasone  REmdesivir, patent declines  Continue supportive care  Covid PCR pending  Isolation  Vacination on recovery

## 2021-12-29 NOTE — SUBJECTIVE & OBJECTIVE
Interval History: See Hospital Course    Review of Systems   Constitutional: Positive for activity change. Negative for chills and diaphoresis.   HENT: Negative for congestion.    Eyes: Negative.    Respiratory: Positive for cough, chest tightness and shortness of breath. Negative for wheezing.    Cardiovascular: Negative for chest pain, palpitations and leg swelling.   Gastrointestinal: Negative for abdominal pain, nausea and vomiting. Diarrhea: one episode - large volume.   Genitourinary: Negative.    Musculoskeletal: Negative.    Skin: Negative.    Neurological: Negative for headaches.   Psychiatric/Behavioral: Negative for confusion.     Objective:     Vital Signs (Most Recent):  Temp: 98.2 °F (36.8 °C) (12/29/21 1524)  Pulse: 85 (12/29/21 1524)  Resp: 16 (12/29/21 1524)  BP: (!) 165/87 (12/29/21 1524)  SpO2: (!) 92 % (12/29/21 1524) Vital Signs (24h Range):  Temp:  [97.7 °F (36.5 °C)-98.4 °F (36.9 °C)] 98.2 °F (36.8 °C)  Pulse:  [66-87] 85  Resp:  [16-20] 16  SpO2:  [90 %-99 %] 92 %  BP: (128-165)/(64-87) 165/87     Weight: 92.3 kg (203 lb 7.8 oz)  Body mass index is 31.87 kg/m².  No intake or output data in the 24 hours ending 12/29/21 1716   Physical Exam  Vitals and nursing note reviewed.   Constitutional:       Appearance: She is well-developed. She is ill-appearing.   HENT:      Head: Normocephalic and atraumatic.      Nose: Nose normal.   Eyes:      Conjunctiva/sclera: Conjunctivae normal.   Cardiovascular:      Rate and Rhythm: Normal rate and regular rhythm.      Heart sounds: Normal heart sounds. No murmur heard.  No friction rub. No gallop.    Pulmonary:      Effort: Tachypnea present. No respiratory distress.      Breath sounds: Examination of the right-lower field reveals rales. Examination of the left-lower field reveals rales. Rales present.   Abdominal:      General: Bowel sounds are normal. There is no distension.      Palpations: Abdomen is soft.      Tenderness: There is no abdominal  tenderness.   Musculoskeletal:         General: No tenderness. Normal range of motion.      Cervical back: Normal range of motion and neck supple.   Skin:     General: Skin is warm and dry.   Neurological:      Mental Status: She is alert and oriented to person, place, and time.   Psychiatric:         Behavior: Behavior normal.         Significant Labs:   All pertinent labs within the past 24 hours have been reviewed.  CBC:   Recent Labs   Lab 12/28/21  0517   WBC 7.41   HGB 11.7*   HCT 36.0*        CMP:   Recent Labs   Lab 12/28/21  0517      K 4.1      CO2 27   *   BUN 11   CREATININE 0.6   CALCIUM 9.3   PROT 7.3   ALBUMIN 3.0*   BILITOT 0.2   ALKPHOS 223*   AST 41*   ALT 38   ANIONGAP 13   EGFRNONAA >60       Significant Imaging: I have reviewed all pertinent imaging results/findings within the past 24 hours.

## 2021-12-29 NOTE — ASSESSMENT & PLAN NOTE
+ COVID PCR  Normal WBC  Procalcitonin and BNP normal  Sputum culture pending  Supplemental oxygen to maintain sats > 92 %  Incentive spirometer  COVID inflammatory markers elevated  CTA of Chest Large volume solid and subsolid pulmonary opacities   Albuterol inhaler  CXR - worsening of the mixed interstitial and alveolar opacities throughout the periphery of the lungs

## 2021-12-29 NOTE — PROGRESS NOTES
[facility] - Telemetry ([facility])  Pulmonology  Progress Note    Patient Name: Yanira Medina  MRN: [id number]  Admission Date: 12/27/2021  Hospital Length of Stay: 1 days  Code Status: Full Code  Attending Provider: Indu Freeman MD  Primary Care Provider: Juvenal White MD   Principal Problem: Suspected COVID-19 virus infection    Subjective:     Interval History: 12/28: seen and examined, on venti mask, dexamethasone, AAOx3, Tmax 98.4, declining remdesivir, requesting outpatient monoclonal antibody infusion, explained outpatient criteria for regeneron and need for inpatient management at this time    Review of Systems   Constitutional: Negative for chills and fever.   Respiratory: Negative for cough, hemoptysis and sputum production.    Cardiovascular: Negative for chest pain.   Gastrointestinal: Negative for abdominal pain, constipation, nausea and vomiting.   Skin: Negative for rash.   All other systems reviewed and are negative.      Objective:     Vital Signs (Most Recent):  Temp: 98.1 °F (36.7 °C) (12/29/21 1201)  Pulse: 82 (12/29/21 1201)  Resp: 20 (12/29/21 1201)  BP: 134/71 (12/29/21 1201)  SpO2: 99 % (12/29/21 1201) Vital Signs (24h Range):  Temp:  [97.7 °F (36.5 °C)-98.4 °F (36.9 °C)] 98.1 °F (36.7 °C)  Pulse:  [66-87] 82  Resp:  [16-20] 20  SpO2:  [90 %-99 %] 99 %  BP: (128-141)/(64-71) 134/71     Weight: 92.3 kg (203 lb 7.8 oz)  Body mass index is 31.87 kg/m².      Intake/Output Summary (Last 24 hours) at 12/29/2021 1204  Last data filed at 12/28/2021 1530  Gross per 24 hour   Intake 480 ml   Output 450 ml   Net 30 ml       Physical Exam  Vitals and nursing note reviewed.   Constitutional:       Appearance: She is obese.   HENT:      Mouth/Throat:      Mouth: Mucous membranes are moist.   Eyes:      Extraocular Movements: Extraocular movements intact.      Conjunctiva/sclera: Conjunctivae normal.   Cardiovascular:      Rate and Rhythm: Normal rate and regular rhythm.   Pulmonary:      Effort:  Pulmonary effort is normal.   Abdominal:      General: Abdomen is flat.   Musculoskeletal:         General: No swelling.   Skin:     General: Skin is warm and dry.   Neurological:      Mental Status: She is alert and oriented to person, place, and time.   Psychiatric:         Mood and Affect: Mood normal.         Vents:       Lines/Drains/Airways     Peripheral Intravenous Line                 Peripheral IV - Single Lumen 12/27/21 1057 20 G Left Hand 2 days         Peripheral IV - Single Lumen 12/27/21 1834 Left Antecubital 1 day                Significant Labs:    CBC/Anemia Profile:  Recent Labs   Lab 12/27/21  1739 12/28/21  0517   WBC  --  7.41   HGB  --  11.7*   HCT  --  36.0*   PLT  --  235   MCV  --  97   RDW  --  12.6   FERRITIN 1,084*  --         Chemistries:  Recent Labs   Lab 12/28/21  0517      K 4.1      CO2 27   BUN 11   CREATININE 0.6   CALCIUM 9.3   ALBUMIN 3.0*   PROT 7.3   BILITOT 0.2   ALKPHOS 223*   ALT 38   AST 41*       All pertinent labs within the past 24 hours have been reviewed.    Significant Imaging:  I have reviewed all pertinent imaging results/findings within the past 24 hours.     X-Ray Chest 1 View  Narrative: EXAMINATION:  XR CHEST 1 VIEW    CLINICAL HISTORY:  follow up;    COMPARISON:  December 27, 2021    FINDINGS:  EKG leads overlie the chest, as does oxygen tubing, which is lordotic in position and rotated to the left.  Worsening mixed interstitial and alveolar opacities throughout the periphery of the lungs.  The hilar and mediastinal contours and osseous structures are unchanged.  Impression: 1.  Overall, there is been worsening of the mixed interstitial and alveolar opacities throughout the periphery of the lungs, concerning for worsening pneumonia, such as community-acquired pneumonia or atypical viral pneumonia such as COVID-19 infection.  Clinical correlation is advised.    2.  Follow-up radiographs recommended.    Electronically signed by: Tulio Cason  MD  Date:    12/29/2021  Time:    11:49          ABG  Recent Labs   Lab 12/28/21  1615   PH 7.473*   PO2 51*   PCO2 39.4   HCO3 28.9*   BE 5     Assessment/Plan:     * Suspected COVID-19 virus infection  Dexamethasone  REmdesivir, patent declines  Continue supportive care  Covid PCR pending  Isolation  Vacination on recovery    Mild intermittent asthma without complication  Albuterol    Alpha-1-antitrypsin deficiency carrier  MZ phenotype  Normal PFT last office visit    Acute hypoxemic respiratory failure  Patient with Hypoxic Respiratory failure which is Acute.  she is not on home oxygen. Supplemental oxygen was provided and noted-  .   Signs/symptoms of respiratory failure include- tachypnea and increased work of breathing. Contributing diagnoses includes - Pneumonia and SARS covid 19 Labs and images were reviewed. Patient Has recent ABG, which has been reviewed. Will treat underlying causes and adjust management of respiratory failure   as follows-      venti mask  Incentive spirometry  Bronchodilators  Home meds ASMANEX    Pneumonia  PO DOXYCYCLINE      Urmila Dejesus PA-C  Pulmonology  [facility] - Telemetry ([facility])

## 2021-12-29 NOTE — ASSESSMENT & PLAN NOTE
Patient with Hypoxic Respiratory failure which is Acute.  she is not on home oxygen. Supplemental oxygen was provided and noted-  .   Signs/symptoms of respiratory failure include- tachypnea and increased work of breathing. Contributing diagnoses includes - Pneumonia and SARS covid 19 Labs and images were reviewed. Patient Has recent ABG, which has been reviewed. Will treat underlying causes and adjust management of respiratory failure   as follows-      venti mask  Incentive spirometry  Bronchodilators  Home meds ASMANEX

## 2021-12-29 NOTE — CARE UPDATE
Pt has notified the nurse that she wishes to leave the hospital and that her family is here to transport her.      Pt was seen and her family is at bedside. The patient states that she wishes to transfer to [facility] to pursue care there. She wishes to leave via ambulance due to her need for oxygen. She stated that her PCP from [facility] told her that she could transfer to [facility].    Pt was explained that this is a lateral transfer. She would need to contact a provider associated with [facility] and obtain an accepting hospital provider. Then [facility] would need to to check bed availability. If a hospital provider accepted her then ambulance transfer could be arranged. Pt stated she would initiate the process of transfer to [facility].    This was witnessed by Jessica Archuleta RN.

## 2021-12-29 NOTE — CARE UPDATE
COVID-19 Routine Screening [[id number]] (Abnormal)    Collected: 12/28/21 2058    Updated: 12/29/21 1340    Specimen Type: Nasopharyngeal     SARS-CoV2 (COVID-19) Qualitative PCR Detected

## 2021-12-29 NOTE — SUBJECTIVE & OBJECTIVE
Interval History: 12/28: seen and examined, on venti mask, dexamethasone, AAOx3, Tmax 98.4, declining remdesivir, requesting outpatient monoclonal antibody infusion, explained outpatient criteria for regeneron and need for inpatient management at this time    Review of Systems   Constitutional: Negative for chills and fever.   Respiratory: Negative for cough, hemoptysis and sputum production.    Cardiovascular: Negative for chest pain.   Gastrointestinal: Negative for abdominal pain, constipation, nausea and vomiting.   Skin: Negative for rash.   All other systems reviewed and are negative.      Objective:     Vital Signs (Most Recent):  Temp: 98.1 °F (36.7 °C) (12/29/21 1201)  Pulse: 82 (12/29/21 1201)  Resp: 20 (12/29/21 1201)  BP: 134/71 (12/29/21 1201)  SpO2: 99 % (12/29/21 1201) Vital Signs (24h Range):  Temp:  [97.7 °F (36.5 °C)-98.4 °F (36.9 °C)] 98.1 °F (36.7 °C)  Pulse:  [66-87] 82  Resp:  [16-20] 20  SpO2:  [90 %-99 %] 99 %  BP: (128-141)/(64-71) 134/71     Weight: 92.3 kg (203 lb 7.8 oz)  Body mass index is 31.87 kg/m².      Intake/Output Summary (Last 24 hours) at 12/29/2021 1204  Last data filed at 12/28/2021 1530  Gross per 24 hour   Intake 480 ml   Output 450 ml   Net 30 ml       Physical Exam  Vitals and nursing note reviewed.   Constitutional:       Appearance: She is obese.   HENT:      Mouth/Throat:      Mouth: Mucous membranes are moist.   Eyes:      Extraocular Movements: Extraocular movements intact.      Conjunctiva/sclera: Conjunctivae normal.   Cardiovascular:      Rate and Rhythm: Normal rate and regular rhythm.   Pulmonary:      Effort: Pulmonary effort is normal.   Abdominal:      General: Abdomen is flat.   Musculoskeletal:         General: No swelling.   Skin:     General: Skin is warm and dry.   Neurological:      Mental Status: She is alert and oriented to person, place, and time.   Psychiatric:         Mood and Affect: Mood normal.         Vents:       Lines/Drains/Airways      Peripheral Intravenous Line                 Peripheral IV - Single Lumen 12/27/21 1057 20 G Left Hand 2 days         Peripheral IV - Single Lumen 12/27/21 1834 Left Antecubital 1 day                Significant Labs:    CBC/Anemia Profile:  Recent Labs   Lab 12/27/21  1739 12/28/21  0517   WBC  --  7.41   HGB  --  11.7*   HCT  --  36.0*   PLT  --  235   MCV  --  97   RDW  --  12.6   FERRITIN 1,084*  --         Chemistries:  Recent Labs   Lab 12/28/21  0517      K 4.1      CO2 27   BUN 11   CREATININE 0.6   CALCIUM 9.3   ALBUMIN 3.0*   PROT 7.3   BILITOT 0.2   ALKPHOS 223*   ALT 38   AST 41*       All pertinent labs within the past 24 hours have been reviewed.    Significant Imaging:  I have reviewed all pertinent imaging results/findings within the past 24 hours.     X-Ray Chest 1 View  Narrative: EXAMINATION:  XR CHEST 1 VIEW    CLINICAL HISTORY:  follow up;    COMPARISON:  December 27, 2021    FINDINGS:  EKG leads overlie the chest, as does oxygen tubing, which is lordotic in position and rotated to the left.  Worsening mixed interstitial and alveolar opacities throughout the periphery of the lungs.  The hilar and mediastinal contours and osseous structures are unchanged.  Impression: 1.  Overall, there is been worsening of the mixed interstitial and alveolar opacities throughout the periphery of the lungs, concerning for worsening pneumonia, such as community-acquired pneumonia or atypical viral pneumonia such as COVID-19 infection.  Clinical correlation is advised.    2.  Follow-up radiographs recommended.    Electronically signed by: Tulio Cason MD  Date:    12/29/2021  Time:    11:49

## 2021-12-30 PROBLEM — R79.89 ABNORMAL LFTS: Status: ACTIVE | Noted: 2021-12-30

## 2021-12-30 PROBLEM — R77.0 ABNORMALITY OF ALBUMIN: Status: ACTIVE | Noted: 2021-12-30

## 2021-12-30 LAB
ALBUMIN SERPL BCP-MCNC: 2.7 G/DL (ref 3.5–5.2)
ALP SERPL-CCNC: 251 U/L (ref 55–135)
ALT SERPL W/O P-5'-P-CCNC: 94 U/L (ref 10–44)
ANION GAP SERPL CALC-SCNC: 14 MMOL/L (ref 8–16)
AST SERPL-CCNC: 89 U/L (ref 10–40)
BASOPHILS # BLD AUTO: 0.01 K/UL (ref 0–0.2)
BASOPHILS NFR BLD: 0.1 % (ref 0–1.9)
BILIRUB SERPL-MCNC: 0.4 MG/DL (ref 0.1–1)
BUN SERPL-MCNC: 14 MG/DL (ref 6–20)
CALCIUM SERPL-MCNC: 8.6 MG/DL (ref 8.7–10.5)
CHLORIDE SERPL-SCNC: 103 MMOL/L (ref 95–110)
CO2 SERPL-SCNC: 27 MMOL/L (ref 23–29)
CREAT SERPL-MCNC: 0.6 MG/DL (ref 0.5–1.4)
CRP SERPL-MCNC: 100.52 MG/L (ref 0–3.19)
D DIMER PPP IA.FEU-MCNC: 6.61 MG/L FEU
DIFFERENTIAL METHOD: ABNORMAL
EOSINOPHIL # BLD AUTO: 0 K/UL (ref 0–0.5)
EOSINOPHIL NFR BLD: 0 % (ref 0–8)
ERYTHROCYTE [DISTWIDTH] IN BLOOD BY AUTOMATED COUNT: 12.5 % (ref 11.5–14.5)
EST. GFR  (AFRICAN AMERICAN): >60 ML/MIN/1.73 M^2
EST. GFR  (NON AFRICAN AMERICAN): >60 ML/MIN/1.73 M^2
GAMMA INTERFERON BACKGROUND BLD IA-ACNC: 0.03 IU/ML
GLUCOSE SERPL-MCNC: 86 MG/DL (ref 70–110)
HCT VFR BLD AUTO: 36 % (ref 37–48.5)
HGB BLD-MCNC: 11.7 G/DL (ref 12–16)
IMM GRANULOCYTES # BLD AUTO: 0.17 K/UL (ref 0–0.04)
IMM GRANULOCYTES NFR BLD AUTO: 2.5 % (ref 0–0.5)
LYMPHOCYTES # BLD AUTO: 1 K/UL (ref 1–4.8)
LYMPHOCYTES NFR BLD: 15.3 % (ref 18–48)
M TB IFN-G CD4+ BCKGRND COR BLD-ACNC: 0.01 IU/ML
MCH RBC QN AUTO: 31.7 PG (ref 27–31)
MCHC RBC AUTO-ENTMCNC: 32.5 G/DL (ref 32–36)
MCV RBC AUTO: 98 FL (ref 82–98)
MITOGEN IGNF BCKGRD COR BLD-ACNC: 0.16 IU/ML
MONOCYTES # BLD AUTO: 0.4 K/UL (ref 0.3–1)
MONOCYTES NFR BLD: 6.1 % (ref 4–15)
NEUTROPHILS # BLD AUTO: 5.1 K/UL (ref 1.8–7.7)
NEUTROPHILS NFR BLD: 76 % (ref 38–73)
NRBC BLD-RTO: 0 /100 WBC
PLATELET # BLD AUTO: 260 K/UL (ref 150–450)
PMV BLD AUTO: 9.5 FL (ref 9.2–12.9)
POTASSIUM SERPL-SCNC: 3.6 MMOL/L (ref 3.5–5.1)
PROT SERPL-MCNC: 6.7 G/DL (ref 6–8.4)
RBC # BLD AUTO: 3.69 M/UL (ref 4–5.4)
SODIUM SERPL-SCNC: 144 MMOL/L (ref 136–145)
TB GOLD PLUS: ABNORMAL
TB2 - NIL: 0.01 IU/ML
WBC # BLD AUTO: 6.74 K/UL (ref 3.9–12.7)

## 2021-12-30 PROCEDURE — 85025 COMPLETE CBC W/AUTO DIFF WBC: CPT | Performed by: NURSE PRACTITIONER

## 2021-12-30 PROCEDURE — 21400001 HC TELEMETRY ROOM

## 2021-12-30 PROCEDURE — 63600175 PHARM REV CODE 636 W HCPCS: Performed by: NURSE PRACTITIONER

## 2021-12-30 PROCEDURE — 36415 COLL VENOUS BLD VENIPUNCTURE: CPT | Performed by: NURSE PRACTITIONER

## 2021-12-30 PROCEDURE — 80053 COMPREHEN METABOLIC PANEL: CPT | Performed by: NURSE PRACTITIONER

## 2021-12-30 PROCEDURE — 94799 UNLISTED PULMONARY SVC/PX: CPT

## 2021-12-30 PROCEDURE — 25000003 PHARM REV CODE 250: Performed by: NURSE PRACTITIONER

## 2021-12-30 PROCEDURE — 94761 N-INVAS EAR/PLS OXIMETRY MLT: CPT

## 2021-12-30 PROCEDURE — 99900035 HC TECH TIME PER 15 MIN (STAT)

## 2021-12-30 PROCEDURE — 99233 SBSQ HOSP IP/OBS HIGH 50: CPT | Mod: ,,, | Performed by: INTERNAL MEDICINE

## 2021-12-30 PROCEDURE — 94640 AIRWAY INHALATION TREATMENT: CPT

## 2021-12-30 PROCEDURE — 85379 FIBRIN DEGRADATION QUANT: CPT | Performed by: NURSE PRACTITIONER

## 2021-12-30 PROCEDURE — 99233 PR SUBSEQUENT HOSPITAL CARE,LEVL III: ICD-10-PCS | Mod: ,,, | Performed by: INTERNAL MEDICINE

## 2021-12-30 PROCEDURE — 27000207 HC ISOLATION

## 2021-12-30 PROCEDURE — 27100171 HC OXYGEN HIGH FLOW UP TO 24 HOURS

## 2021-12-30 RX ADMIN — ALBUTEROL SULFATE 2 PUFF: 90 AEROSOL, METERED RESPIRATORY (INHALATION) at 08:12

## 2021-12-30 RX ADMIN — Medication 6 MG: at 09:12

## 2021-12-30 RX ADMIN — ALBUTEROL SULFATE 2 PUFF: 90 AEROSOL, METERED RESPIRATORY (INHALATION) at 12:12

## 2021-12-30 RX ADMIN — OXYCODONE HYDROCHLORIDE AND ACETAMINOPHEN 500 MG: 500 TABLET ORAL at 09:12

## 2021-12-30 RX ADMIN — DEXAMETHASONE 6 MG: 4 TABLET ORAL at 08:12

## 2021-12-30 RX ADMIN — THERA TABS 1 TABLET: TAB at 08:12

## 2021-12-30 RX ADMIN — FAMOTIDINE 20 MG: 20 TABLET ORAL at 09:12

## 2021-12-30 RX ADMIN — ACETAMINOPHEN 650 MG: 325 TABLET ORAL at 09:12

## 2021-12-30 RX ADMIN — OXYCODONE HYDROCHLORIDE AND ACETAMINOPHEN 500 MG: 500 TABLET ORAL at 08:12

## 2021-12-30 RX ADMIN — ENOXAPARIN SODIUM 90 MG: 100 INJECTION SUBCUTANEOUS at 08:12

## 2021-12-30 RX ADMIN — ENOXAPARIN SODIUM 90 MG: 100 INJECTION SUBCUTANEOUS at 09:12

## 2021-12-30 RX ADMIN — FAMOTIDINE 20 MG: 20 TABLET ORAL at 08:12

## 2021-12-30 NOTE — PROGRESS NOTES
[facility] - Telemetry ([facility])  Pulmonology  Progress Note    Patient Name: Yanira Medina  MRN: [id number]  Admission Date: 12/27/2021  Hospital Length of Stay: 2 days  Code Status: Full Code  Attending Provider: Indu Freeman MD  Primary Care Provider: Juvenal White MD   Principal Problem: Pneumonia due to COVID-19 virus    Subjective:     Interval History:     12/30: seen and examined: overnight events noted, states comfortable with care at [facility] currently, not open to COVID standard of care meds for escalating hypoxemia, sleep prone , inhalers on dexamethasone, ALT and AST rising, Dimer increased taking lovenox, alb has declined    Review of Systems   Constitutional: Positive for malaise/fatigue.   Respiratory: Positive for shortness of breath.         Objective:     Vital Signs (Most Recent):  Temp: 97.8 °F (36.6 °C) (12/30/21 1139)  Pulse: 92 (12/30/21 1331)  Resp: 18 (12/30/21 1331)  BP: 118/80 (12/30/21 1139)  SpO2: 95 % (12/30/21 1331) Vital Signs (24h Range):  Temp:  [97.6 °F (36.4 °C)-98.2 °F (36.8 °C)] 97.8 °F (36.6 °C)  Pulse:  [58-92] 92  Resp:  [16-20] 18  SpO2:  [81 %-100 %] 95 %  BP: (102-165)/(61-87) 118/80     Weight: 92.3 kg (203 lb 7.8 oz)  Body mass index is 31.87 kg/m².    No intake or output data in the 24 hours ending 12/30/21 1338    Physical Exam  Vitals and nursing note reviewed.   Constitutional:       Appearance: She is obese.   HENT:      Mouth/Throat:      Mouth: Mucous membranes are moist.   Eyes:      Extraocular Movements: Extraocular movements intact.      Conjunctiva/sclera: Conjunctivae normal.   Cardiovascular:      Rate and Rhythm: Normal rate and regular rhythm.   Pulmonary:      Effort: Pulmonary effort is normal.   Abdominal:      General: Abdomen is flat.   Musculoskeletal:         General: No swelling.   Skin:     General: Skin is warm and dry.   Neurological:      Mental Status: She is alert and oriented to person, place, and time.   Psychiatric:          Mood and Affect: Mood normal.         Vents:  Oxygen Concentration (%): 50 (12/30/21 0030)    Lines/Drains/Airways     Peripheral Intravenous Line                 Peripheral IV - Single Lumen 12/27/21 1057 20 G Left Hand 3 days         Peripheral IV - Single Lumen 12/27/21 1834 Left Antecubital 2 days                Significant Labs:    CBC/Anemia Profile:  Recent Labs   Lab 12/29/21  1639 12/30/21  0547   WBC  --  6.74   HGB  --  11.7*   HCT  --  36.0*   PLT  --  260   MCV  --  98   RDW  --  12.5   FERRITIN 1,878*  --         Chemistries:  Recent Labs   Lab 12/30/21  0547      K 3.6      CO2 27   BUN 14   CREATININE 0.6   CALCIUM 8.6*   ALBUMIN 2.7*   PROT 6.7   BILITOT 0.4   ALKPHOS 251*   ALT 94*   AST 89*     Component      Latest Ref Rng & Units 12/30/2021 12/29/2021   Ferritin      20.0 - 300.0 ng/mL  1,878 (H)   CRP, High Sensitivity      0.00 - 3.19 mg/L  100.52 (H)   LD      110 - 260 U/L  794 (H)   Sed Rate      0 - 20 mm/Hr  120 (H)   D-Dimer      <0.50 mg/L FEU 6.61 (H)      All pertinent labs within the past 24 hours have been reviewed.    Significant Imaging:  I have reviewed all pertinent imaging results/findings within the past 24 hours.     X-Ray Chest 1 View  Narrative: EXAMINATION:  XR CHEST 1 VIEW    CLINICAL HISTORY:  follow up;    COMPARISON:  December 27, 2021    FINDINGS:  EKG leads overlie the chest, as does oxygen tubing, which is lordotic in position and rotated to the left.  Worsening mixed interstitial and alveolar opacities throughout the periphery of the lungs.  The hilar and mediastinal contours and osseous structures are unchanged.  Impression: 1.  Overall, there is been worsening of the mixed interstitial and alveolar opacities throughout the periphery of the lungs, concerning for worsening pneumonia, such as community-acquired pneumonia or atypical viral pneumonia such as COVID-19 infection.  Clinical correlation is advised.    2.  Follow-up radiographs  recommended.    Electronically signed by: Tulio Cason MD  Date:    12/29/2021  Time:    11:49         ABG  Recent Labs   Lab 12/28/21  1615   PH 7.473*   PO2 51*   PCO2 39.4   HCO3 28.9*   BE 5     Assessment/Plan:     * Pneumonia due to COVID-19 virus  Dexamethasone  REmdesivir, patent declines  BACITRINIB: Only elligible after trial of REMDESIVIR  Continue supportive care  Covid PCR +ve  LMWH  Isolation  Vacination on recovery    Abnormal LFTs  US liver  ? GI input    Abnormality of albumin  Nutrition eval    Mild intermittent asthma without complication  Albuterol    Alpha-1-antitrypsin deficiency carrier  MZ phenotype  Normal PFT last office visit    Acute hypoxemic respiratory failure  Patient with Hypoxic Respiratory failure which is Acute.  she is not on home oxygen. Supplemental oxygen was provided and noted- Oxygen Concentration (%):  [50] 50.   Signs/symptoms of respiratory failure include- tachypnea and increased work of breathing. Contributing diagnoses includes - Pneumonia and SARS covid 19 Labs and images were reviewed. Patient Has recent ABG, which has been reviewed. Will treat underlying causes and adjust management of respiratory failure   as follows-      venti mask  Incentive spirometry  Bronchodilators  Home meds ASMANEX         Ovidio Cannon MD  Pulmonology  [facility] - Telemetry (Hospital)

## 2021-12-30 NOTE — PLAN OF CARE
Pt is AOX4.   C/o of back pain and pain med adminstered.  Pt on 15L venti mask.   Safety measures in place.    Right foot swollen unknown cause documented in chart and will report to day nurse    Will continue to monitor

## 2021-12-30 NOTE — SUBJECTIVE & OBJECTIVE
Interval History: See Hospital Course    Review of Systems   Constitutional: Positive for activity change. Negative for chills and diaphoresis.   HENT: Negative for congestion.    Eyes: Negative.    Respiratory: Positive for cough, chest tightness and shortness of breath. Negative for wheezing.    Cardiovascular: Negative for chest pain, palpitations and leg swelling.   Gastrointestinal: Negative for abdominal pain, nausea and vomiting. Diarrhea: one episode - large volume.   Genitourinary: Negative.    Musculoskeletal: Negative.    Skin: Negative.    Neurological: Negative for headaches.   Psychiatric/Behavioral: Negative for confusion.     Objective:     Vital Signs (Most Recent):  Temp: 97.8 °F (36.6 °C) (12/30/21 1139)  Pulse: 92 (12/30/21 1331)  Resp: 18 (12/30/21 1331)  BP: 118/80 (12/30/21 1139)  SpO2: 95 % (12/30/21 1331) Vital Signs (24h Range):  Temp:  [97.6 °F (36.4 °C)-98.2 °F (36.8 °C)] 97.8 °F (36.6 °C)  Pulse:  [58-92] 92  Resp:  [16-20] 18  SpO2:  [81 %-100 %] 95 %  BP: (102-165)/(61-87) 118/80     Weight: 92.3 kg (203 lb 7.8 oz)  Body mass index is 31.87 kg/m².  No intake or output data in the 24 hours ending 12/30/21 1412   Physical Exam  Vitals and nursing note reviewed.   Constitutional:       Appearance: She is well-developed. She is ill-appearing.   HENT:      Head: Normocephalic and atraumatic.      Nose: Nose normal.   Eyes:      Conjunctiva/sclera: Conjunctivae normal.   Cardiovascular:      Rate and Rhythm: Normal rate and regular rhythm.      Heart sounds: Normal heart sounds. No murmur heard.  No friction rub. No gallop.    Pulmonary:      Effort: Tachypnea present. No accessory muscle usage or respiratory distress.      Breath sounds: Examination of the right-lower field reveals rales. Examination of the left-lower field reveals rales. Rales present.      Comments: Left sided breath sound more decreased than right side  Abdominal:      General: Bowel sounds are normal. There is no  distension.      Palpations: Abdomen is soft.      Tenderness: There is no abdominal tenderness.   Musculoskeletal:         General: No tenderness. Normal range of motion.      Cervical back: Normal range of motion and neck supple.   Skin:     General: Skin is warm and dry.   Neurological:      Mental Status: She is alert and oriented to person, place, and time.   Psychiatric:         Behavior: Behavior normal.         Significant Labs:   All pertinent labs within the past 24 hours have been reviewed.  CBC:   Recent Labs   Lab 12/30/21  0547   WBC 6.74   HGB 11.7*   HCT 36.0*        CMP:   Recent Labs   Lab 12/30/21  0547      K 3.6      CO2 27   GLU 86   BUN 14   CREATININE 0.6   CALCIUM 8.6*   PROT 6.7   ALBUMIN 2.7*   BILITOT 0.4   ALKPHOS 251*   AST 89*   ALT 94*   ANIONGAP 14   EGFRNONAA >60       Significant Imaging: I have reviewed all pertinent imaging results/findings within the past 24 hours.

## 2021-12-30 NOTE — SUBJECTIVE & OBJECTIVE
Interval History:     12/30: seen and examined: overnight events noted, states comfortable with care at [facility] currently, not open to COVID standard of care meds for escalating hypoxemia, sleep prone , inhalers on dexamethasone, ALT and AST rising, Dimer increased taking lovenox, alb has declined    Review of Systems   Constitutional: Positive for malaise/fatigue.   Respiratory: Positive for shortness of breath.         Objective:     Vital Signs (Most Recent):  Temp: 97.8 °F (36.6 °C) (12/30/21 1139)  Pulse: 92 (12/30/21 1331)  Resp: 18 (12/30/21 1331)  BP: 118/80 (12/30/21 1139)  SpO2: 95 % (12/30/21 1331) Vital Signs (24h Range):  Temp:  [97.6 °F (36.4 °C)-98.2 °F (36.8 °C)] 97.8 °F (36.6 °C)  Pulse:  [58-92] 92  Resp:  [16-20] 18  SpO2:  [81 %-100 %] 95 %  BP: (102-165)/(61-87) 118/80     Weight: 92.3 kg (203 lb 7.8 oz)  Body mass index is 31.87 kg/m².    No intake or output data in the 24 hours ending 12/30/21 1338    Physical Exam  Vitals and nursing note reviewed.   Constitutional:       Appearance: She is obese.   HENT:      Mouth/Throat:      Mouth: Mucous membranes are moist.   Eyes:      Extraocular Movements: Extraocular movements intact.      Conjunctiva/sclera: Conjunctivae normal.   Cardiovascular:      Rate and Rhythm: Normal rate and regular rhythm.   Pulmonary:      Effort: Pulmonary effort is normal.   Abdominal:      General: Abdomen is flat.   Musculoskeletal:         General: No swelling.   Skin:     General: Skin is warm and dry.   Neurological:      Mental Status: She is alert and oriented to person, place, and time.   Psychiatric:         Mood and Affect: Mood normal.         Vents:  Oxygen Concentration (%): 50 (12/30/21 0030)    Lines/Drains/Airways     Peripheral Intravenous Line                 Peripheral IV - Single Lumen 12/27/21 1057 20 G Left Hand 3 days         Peripheral IV - Single Lumen 12/27/21 1834 Left Antecubital 2 days                Significant Labs:    CBC/Anemia  Profile:  Recent Labs   Lab 12/29/21  1639 12/30/21  0547   WBC  --  6.74   HGB  --  11.7*   HCT  --  36.0*   PLT  --  260   MCV  --  98   RDW  --  12.5   FERRITIN 1,878*  --         Chemistries:  Recent Labs   Lab 12/30/21  0547      K 3.6      CO2 27   BUN 14   CREATININE 0.6   CALCIUM 8.6*   ALBUMIN 2.7*   PROT 6.7   BILITOT 0.4   ALKPHOS 251*   ALT 94*   AST 89*     Component      Latest Ref Rng & Units 12/30/2021 12/29/2021   Ferritin      20.0 - 300.0 ng/mL  1,878 (H)   CRP, High Sensitivity      0.00 - 3.19 mg/L  100.52 (H)   LD      110 - 260 U/L  794 (H)   Sed Rate      0 - 20 mm/Hr  120 (H)   D-Dimer      <0.50 mg/L FEU 6.61 (H)      All pertinent labs within the past 24 hours have been reviewed.    Significant Imaging:  I have reviewed all pertinent imaging results/findings within the past 24 hours.     X-Ray Chest 1 View  Narrative: EXAMINATION:  XR CHEST 1 VIEW    CLINICAL HISTORY:  follow up;    COMPARISON:  December 27, 2021    FINDINGS:  EKG leads overlie the chest, as does oxygen tubing, which is lordotic in position and rotated to the left.  Worsening mixed interstitial and alveolar opacities throughout the periphery of the lungs.  The hilar and mediastinal contours and osseous structures are unchanged.  Impression: 1.  Overall, there is been worsening of the mixed interstitial and alveolar opacities throughout the periphery of the lungs, concerning for worsening pneumonia, such as community-acquired pneumonia or atypical viral pneumonia such as COVID-19 infection.  Clinical correlation is advised.    2.  Follow-up radiographs recommended.    Electronically signed by: Tulio Cason MD  Date:    12/29/2021  Time:    11:49

## 2021-12-30 NOTE — PROGRESS NOTES
[facility] Medicine  Progress Note    Patient Name: Yanira Medina  MRN: [id number]  Patient Class: IP- Inpatient   Admission Date: 12/27/2021  Length of Stay: 2 days  Attending Physician: Indu Freeman MD  Primary Care Provider: Juvenal White MD        Subjective:     Principal Problem:Pneumonia due to COVID-19 virus        HPI:  Yanira Medina is a 46 y.o. female who presents with 2-3 days of gradual onset, worsening, severe SOB with associated chest tightness and cough at home.  Multiple family members sick with viral/URI symptoms and one tested positive for COVID-19. Pt explains approx 3 days ago she developed sinus symptoms, severe headache, chills, sweats and had 1 episode of severe vomiting. The symptoms commenced to cough and SOB. Pt denies any recorded fevers. She notes SOB with exertion but reports feeling better as her headache has resolved. In the ED, her pulse ox was 86 % with exercise. COVID rapid screen was negative. COVID marker labs are pending. CXR - scattered pulmonary infiltrates. CTA of Chest is pending for further clarification and to rule out pulmonary embolism. Currently, pt is requiring 3 to 4 liters of oxygen via nasal cannula. Pt denies admission to hospital for asthma exacerbation.   CBC notes a left shift at 90 %, Alk Phos 207, AST 50, ALT 45.  COVID screen was negative  As clarification, on 12/27/2021 patient should be admitted for hospital observation services under my care in collaboration with Indu Freeman MD    Admitting diagnoses include pneumonia and hypoxic respiratory failure.          Overview/Hospital Course:  Worsening hypoxic respiratory failure overnight. Now on 15 L HFNC. Mild tachypnea but no obvious distress or increased work of breathing. COVID PCR pending. COVID inflammatory markers are elevated. Procalcitonin and BNP negative. CTA of Chest negative for PE but large volue solid and subsolid pulmonary opacities  concerning for COVID. Pt with hx of asthma and alpha tripsyn 1. Initially she refused remdesivir infusion but after seen by Dr. Cannon - pt agreeable to receive remdesivir. Strongly suspect COVID pneumonia due to clinical picture and physical exam.   12/29 Pt changed her mind about remdesivir. Also, refused Lovenox therapeutic anticoagulation. Today + COVID screen. Overnight increased oxygen demand due to worsened hypoxia with exertion. Pt wearing both 15 L and venti mask with oxygen at 33 %. SpO2 92 %. Mild tachypnea but no increased work of breathing. CXR worsened. Pt highly motivated with cough, deep breath and use of IS. Explained rationale of remdesevir but pt refuses administration. This was explained by both Nabil Cannon and Xochitl. Decadron and albuterol continued.   12/30 - Pt states she found out that [facility] does not have beds. She is okay with staying here and continuing treatment. She verbalizes understanding of the high level of oxygen requirement. She had an uneventful night and denies any changes in her breathing. Her cough is productive of pale yellow sputum with small amount blood tinged. She is less anxious today - pleasant demeanor. Highly motivated in cough/deep breathing and use of incentive spirometer. Currently on 15 L nasal cannula with  SpO2 95  %. Decadron, albuterol and LMWH in progress. She has mild edema to right foot which patient says is chronic. She denies any leg pain. Slight elevation in liver enzymes from baseline AST 41 >> 89; ALT 38 >> 94.  CRP down; ferritin &  sed rate more elevated.       Interval History: See Hospital Course    Review of Systems   Constitutional: Positive for activity change. Negative for chills and diaphoresis.   HENT: Negative for congestion.    Eyes: Negative.    Respiratory: Positive for cough, chest tightness and shortness of breath. Negative for wheezing.    Cardiovascular: Negative for chest pain, palpitations and leg swelling.    Gastrointestinal: Negative for abdominal pain, nausea and vomiting. Diarrhea: one episode - large volume.   Genitourinary: Negative.    Musculoskeletal: Negative.    Skin: Negative.    Neurological: Negative for headaches.   Psychiatric/Behavioral: Negative for confusion.     Objective:     Vital Signs (Most Recent):  Temp: 97.8 °F (36.6 °C) (12/30/21 1139)  Pulse: 92 (12/30/21 1331)  Resp: 18 (12/30/21 1331)  BP: 118/80 (12/30/21 1139)  SpO2: 95 % (12/30/21 1331) Vital Signs (24h Range):  Temp:  [97.6 °F (36.4 °C)-98.2 °F (36.8 °C)] 97.8 °F (36.6 °C)  Pulse:  [58-92] 92  Resp:  [16-20] 18  SpO2:  [81 %-100 %] 95 %  BP: (102-165)/(61-87) 118/80     Weight: 92.3 kg (203 lb 7.8 oz)  Body mass index is 31.87 kg/m².  No intake or output data in the 24 hours ending 12/30/21 1412   Physical Exam  Vitals and nursing note reviewed.   Constitutional:       Appearance: She is well-developed. She is ill-appearing.   HENT:      Head: Normocephalic and atraumatic.      Nose: Nose normal.   Eyes:      Conjunctiva/sclera: Conjunctivae normal.   Cardiovascular:      Rate and Rhythm: Normal rate and regular rhythm.      Heart sounds: Normal heart sounds. No murmur heard.  No friction rub. No gallop.    Pulmonary:      Effort: Tachypnea present. No accessory muscle usage or respiratory distress.      Breath sounds: Examination of the right-lower field reveals rales. Examination of the left-lower field reveals rales. Rales present.      Comments: Left sided breath sound more decreased than right side  Abdominal:      General: Bowel sounds are normal. There is no distension.      Palpations: Abdomen is soft.      Tenderness: There is no abdominal tenderness.   Musculoskeletal:         General: No tenderness. Normal range of motion.      Cervical back: Normal range of motion and neck supple.   Skin:     General: Skin is warm and dry.   Neurological:      Mental Status: She is alert and oriented to person, place, and time.    Psychiatric:         Behavior: Behavior normal.         Significant Labs:   All pertinent labs within the past 24 hours have been reviewed.  CBC:   Recent Labs   Lab 12/30/21  0547   WBC 6.74   HGB 11.7*   HCT 36.0*        CMP:   Recent Labs   Lab 12/30/21  0547      K 3.6      CO2 27   GLU 86   BUN 14   CREATININE 0.6   CALCIUM 8.6*   PROT 6.7   ALBUMIN 2.7*   BILITOT 0.4   ALKPHOS 251*   AST 89*   ALT 94*   ANIONGAP 14   EGFRNONAA >60       Significant Imaging: I have reviewed all pertinent imaging results/findings within the past 24 hours.      Assessment/Plan:      * Pneumonia due to COVID-19 virus  + COVID PCR  Normal WBC  Procalcitonin and BNP normal  Sputum culture pending  Supplemental oxygen to maintain sats > 92 %  Incentive spirometer  COVID inflammatory markers elevated  CTA of Chest Large volume solid and subsolid pulmonary opacities   Albuterol inhaler  CXR - worsening of the mixed interstitial and alveolar opacities throughout the periphery of the lungs        Abnormal LFTs  Slight elevation  Liver US pending      Mild intermittent asthma without complication        Alpha-1-antitrypsin deficiency carrier        Acute hypoxemic respiratory failure  Patient with Hypoxic Respiratory failure which is Acute.  she is not on home oxygen. Supplemental oxygen was provided and noted- Oxygen Concentration (%):  [50] 50.   Signs/symptoms of respiratory failure include- tachypnea. Contributing diagnoses includes - asthma Labs and images were reviewed. Patient Has not had a recent ABG. Will treat underlying causes and adjust management of respiratory failure   Hypoxia secondary to pneumonia  Suspected to be viral in nature - COVID + on 12/29/21  Normal WBC  Procalcitonin and BNP are normal  Sputum culture  Supplemental oxygen to maintain sats > 92 %  Incentive spirometer  CTA of Chest - Large volume solid and subsolid pulmonary opacities       Adult hypothyroidism  Continue synthroid        VTE  Risk Mitigation (From admission, onward)         Ordered     enoxaparin injection 90 mg  2 times daily         12/28/21 0825     IP VTE HIGH RISK PATIENT  Once         12/27/21 1759     Place sequential compression device  Until discontinued         12/27/21 1759                Discharge Planning   SANDRA:      Code Status: Full Code   Is the patient medically ready for discharge?:     Reason for patient still in hospital (select all that apply): Patient trending condition and Treatment  Discharge Plan A: Home with family                  Deysi Castaneda NP  Department of Hospital Medicine   '[place] - Telemetry ([facility])

## 2021-12-30 NOTE — ASSESSMENT & PLAN NOTE
Patient with Hypoxic Respiratory failure which is Acute.  she is not on home oxygen. Supplemental oxygen was provided and noted-  .   Signs/symptoms of respiratory failure include- tachypnea. Contributing diagnoses includes - asthma Labs and images were reviewed. Patient Has not had a recent ABG. Will treat underlying causes and adjust management of respiratory failure   Hypoxia secondary to pneumonia  Suspected to be viral in nature - COVID + on 12/29/21  Normal WBC  Procalcitonin and BNP are normal  Sputum culture  Supplemental oxygen to maintain sats > 92 %  Incentive spirometer  CTA of Chest - Large volume solid and subsolid pulmonary opacities

## 2021-12-30 NOTE — PROGRESS NOTES
[facility] Medicine  Progress Note    Patient Name: Yanira Medina  MRN: [id number]  Patient Class: IP- Inpatient   Admission Date: 12/27/2021  Length of Stay: 1 days  Attending Physician: Indu Freeman MD  Primary Care Provider: Juvenal White MD        Subjective:     Principal Problem:Pneumonia due to COVID-19 virus        HPI:  Yanira Medina is a 46 y.o. female who presents with 2-3 days of gradual onset, worsening, severe SOB with associated chest tightness and cough at home.  Multiple family members sick with viral/URI symptoms and one tested positive for COVID-19. Pt explains approx 3 days ago she developed sinus symptoms, severe headache, chills, sweats and had 1 episode of severe vomiting. The symptoms commenced to cough and SOB. Pt denies any recorded fevers. She notes SOB with exertion but reports feeling better as her headache has resolved. In the ED, her pulse ox was 86 % with exercise. COVID rapid screen was negative. COVID marker labs are pending. CXR - scattered pulmonary infiltrates. CTA of Chest is pending for further clarification and to rule out pulmonary embolism. Currently, pt is requiring 3 to 4 liters of oxygen via nasal cannula. Pt denies admission to hospital for asthma exacerbation.   CBC notes a left shift at 90 %, Alk Phos 207, AST 50, ALT 45.  COVID screen was negative  As clarification, on 12/27/2021 patient should be admitted for hospital observation services under my care in collaboration with Indu Freeman MD    Admitting diagnoses include pneumonia and hypoxic respiratory failure.          Overview/Hospital Course:  Worsening hypoxic respiratory failure overnight. Now on 15 L HFNC. Mild tachypnea but no obvious distress or increased work of breathing. COVID PCR pending. COVID inflammatory markers are elevated. Procalcitonin and BNP negative. CTA of Chest negative for PE but large volue solid and subsolid pulmonary opacities  concerning for COVID. Pt with hx of asthma and alpha tripsyn 1. Initially she refused remdesivir infusion but after seen by Dr. Cannon - pt agreeable to receive remdesivir. Strongly suspect COVID pneumonia due to clinical picture and physical exam.   12/29 Pt changed her mind about remdesivir. Also, refused Lovenox therapeutic anticoagulation. Today + COVID screen. Overnight increased oxygen demand due to worsened hypoxia with exertion. Pt wearing both 15 L and venti mask with oxygen at 33 %. SpO2 92 %. Mild tachypnea but no increased work of breathing. CXR worsened. Pt highly motivated with cough, deep breath and use of IS. Explained rationale of remdesevir but pt refuses administration. This was explained by both Nabil Cannon and Xochitl. Decadron and albuterol continued.         Interval History: See Hospital Course    Review of Systems   Constitutional: Positive for activity change. Negative for chills and diaphoresis.   HENT: Negative for congestion.    Eyes: Negative.    Respiratory: Positive for cough, chest tightness and shortness of breath. Negative for wheezing.    Cardiovascular: Negative for chest pain, palpitations and leg swelling.   Gastrointestinal: Negative for abdominal pain, nausea and vomiting. Diarrhea: one episode - large volume.   Genitourinary: Negative.    Musculoskeletal: Negative.    Skin: Negative.    Neurological: Negative for headaches.   Psychiatric/Behavioral: Negative for confusion.     Objective:     Vital Signs (Most Recent):  Temp: 98.2 °F (36.8 °C) (12/29/21 1524)  Pulse: 85 (12/29/21 1524)  Resp: 16 (12/29/21 1524)  BP: (!) 165/87 (12/29/21 1524)  SpO2: (!) 92 % (12/29/21 1524) Vital Signs (24h Range):  Temp:  [97.7 °F (36.5 °C)-98.4 °F (36.9 °C)] 98.2 °F (36.8 °C)  Pulse:  [66-87] 85  Resp:  [16-20] 16  SpO2:  [90 %-99 %] 92 %  BP: (128-165)/(64-87) 165/87     Weight: 92.3 kg (203 lb 7.8 oz)  Body mass index is 31.87 kg/m².  No intake or output data in the 24 hours ending  12/29/21 1716   Physical Exam  Vitals and nursing note reviewed.   Constitutional:       Appearance: She is well-developed. She is ill-appearing.   HENT:      Head: Normocephalic and atraumatic.      Nose: Nose normal.   Eyes:      Conjunctiva/sclera: Conjunctivae normal.   Cardiovascular:      Rate and Rhythm: Normal rate and regular rhythm.      Heart sounds: Normal heart sounds. No murmur heard.  No friction rub. No gallop.    Pulmonary:      Effort: Tachypnea present. No respiratory distress.      Breath sounds: Examination of the right-lower field reveals rales. Examination of the left-lower field reveals rales. Rales present.   Abdominal:      General: Bowel sounds are normal. There is no distension.      Palpations: Abdomen is soft.      Tenderness: There is no abdominal tenderness.   Musculoskeletal:         General: No tenderness. Normal range of motion.      Cervical back: Normal range of motion and neck supple.   Skin:     General: Skin is warm and dry.   Neurological:      Mental Status: She is alert and oriented to person, place, and time.   Psychiatric:         Behavior: Behavior normal.         Significant Labs:   All pertinent labs within the past 24 hours have been reviewed.  CBC:   Recent Labs   Lab 12/28/21  0517   WBC 7.41   HGB 11.7*   HCT 36.0*        CMP:   Recent Labs   Lab 12/28/21  0517      K 4.1      CO2 27   *   BUN 11   CREATININE 0.6   CALCIUM 9.3   PROT 7.3   ALBUMIN 3.0*   BILITOT 0.2   ALKPHOS 223*   AST 41*   ALT 38   ANIONGAP 13   EGFRNONAA >60       Significant Imaging: I have reviewed all pertinent imaging results/findings within the past 24 hours.      Assessment/Plan:      * Pneumonia due to COVID-19 virus  + COVID PCR  Normal WBC  Procalcitonin and BNP normal  Sputum culture pending  Supplemental oxygen to maintain sats > 92 %  Incentive spirometer  COVID inflammatory markers elevated  CTA of Chest Large volume solid and subsolid pulmonary opacities    Albuterol inhaler  CXR - worsening of the mixed interstitial and alveolar opacities throughout the periphery of the lungs        Mild intermittent asthma without complication        Alpha-1-antitrypsin deficiency carrier        Acute hypoxemic respiratory failure  Patient with Hypoxic Respiratory failure which is Acute.  she is not on home oxygen. Supplemental oxygen was provided and noted-  .   Signs/symptoms of respiratory failure include- tachypnea. Contributing diagnoses includes - asthma Labs and images were reviewed. Patient Has not had a recent ABG. Will treat underlying causes and adjust management of respiratory failure   Hypoxia secondary to pneumonia  Suspected to be viral in nature - COVID + on 12/29/21  Normal WBC  Procalcitonin and BNP are normal  Sputum culture  Supplemental oxygen to maintain sats > 92 %  Incentive spirometer  CTA of Chest - Large volume solid and subsolid pulmonary opacities       Adult hypothyroidism  Continue synthroid        VTE Risk Mitigation (From admission, onward)         Ordered     enoxaparin injection 90 mg  2 times daily         12/28/21 0825     IP VTE HIGH RISK PATIENT  Once         12/27/21 1759     Place sequential compression device  Until discontinued         12/27/21 1759                Discharge Planning   SANDRA:      Code Status: Full Code   Is the patient medically ready for discharge?:     Reason for patient still in hospital (select all that apply): Patient trending condition, Treatment, Imaging and Consult recommendations  Discharge Plan A: Home with family                  Deysi Castaneda NP  Department of Hospital Medicine   [facility] - Telemetry ([facility])

## 2021-12-30 NOTE — PLAN OF CARE
Pt is AOX4. C/o of back pain and pain med adminstered.Pt on 15L venti mask. Refused lovenox. Safety measures in place.  Pt states that she wants to be transferred to [facility]. Notified Dr. Freeman and Deysi, MARLENI and Veronica, charge nurse. Will continue to monitor.

## 2021-12-30 NOTE — CONSULTS
Patient eating well. Spoke to patient to continue eating her proteins to help with albumin. Will continue to monitor.

## 2021-12-30 NOTE — ASSESSMENT & PLAN NOTE
Dexamethasone  REmdesivir, patent declines  BACITRINIB: Only elligible after trial of REMDESIVIR  Continue supportive care  Covid PCR +ve  LMWH  Isolation  Vacination on recovery

## 2021-12-30 NOTE — ASSESSMENT & PLAN NOTE
Patient with Hypoxic Respiratory failure which is Acute.  she is not on home oxygen. Supplemental oxygen was provided and noted- Oxygen Concentration (%):  [50] 50.   Signs/symptoms of respiratory failure include- tachypnea and increased work of breathing. Contributing diagnoses includes - Pneumonia and SARS covid 19 Labs and images were reviewed. Patient Has recent ABG, which has been reviewed. Will treat underlying causes and adjust management of respiratory failure   as follows-      venti mask  Incentive spirometry  Bronchodilators  Home meds ASMANEX

## 2021-12-30 NOTE — PLAN OF CARE
Plan of care reviewed with patient, she verbalized understanding.  Pt remains free from falls this shift, fall precautions in place.  Pt remains free from skin breakdown, she turns and repositions independently while in bed.  AAOx4, VSS, NAD noted at this time.  Pt remained afebrile throughout shift.  15L O2 via high flow NC.  NSR on the tele monitor.  Pt admitted for COVID; decadron and vitamins.  Pt currently resting comfortably in bed.  Hourly rounding complete.  Pt denies needs at this time.

## 2021-12-30 NOTE — ASSESSMENT & PLAN NOTE
Patient with Hypoxic Respiratory failure which is Acute.  she is not on home oxygen. Supplemental oxygen was provided and noted- Oxygen Concentration (%):  [50] 50.   Signs/symptoms of respiratory failure include- tachypnea. Contributing diagnoses includes - asthma Labs and images were reviewed. Patient Has not had a recent ABG. Will treat underlying causes and adjust management of respiratory failure   Hypoxia secondary to pneumonia  Suspected to be viral in nature - COVID + on 12/29/21  Normal WBC  Procalcitonin and BNP are normal  Sputum culture  Supplemental oxygen to maintain sats > 92 %  Incentive spirometer  CTA of Chest - Large volume solid and subsolid pulmonary opacities

## 2021-12-31 LAB
ERYTHROCYTE [SEDIMENTATION RATE] IN BLOOD BY WESTERGREN METHOD: 104 MM/HR (ref 0–20)
FERRITIN SERPL-MCNC: 1109 NG/ML (ref 20–300)
LDH SERPL L TO P-CCNC: 820 U/L (ref 110–260)

## 2021-12-31 PROCEDURE — 99232 PR SUBSEQUENT HOSPITAL CARE,LEVL II: ICD-10-PCS | Mod: ,,, | Performed by: INTERNAL MEDICINE

## 2021-12-31 PROCEDURE — 82728 ASSAY OF FERRITIN: CPT | Performed by: NURSE PRACTITIONER

## 2021-12-31 PROCEDURE — 99232 SBSQ HOSP IP/OBS MODERATE 35: CPT | Mod: ,,, | Performed by: INTERNAL MEDICINE

## 2021-12-31 PROCEDURE — 94760 N-INVAS EAR/PLS OXIMETRY 1: CPT

## 2021-12-31 PROCEDURE — 21400001 HC TELEMETRY ROOM

## 2021-12-31 PROCEDURE — 94640 AIRWAY INHALATION TREATMENT: CPT

## 2021-12-31 PROCEDURE — 27000221 HC OXYGEN, UP TO 24 HOURS

## 2021-12-31 PROCEDURE — 83615 LACTATE (LD) (LDH) ENZYME: CPT | Performed by: NURSE PRACTITIONER

## 2021-12-31 PROCEDURE — 94799 UNLISTED PULMONARY SVC/PX: CPT

## 2021-12-31 PROCEDURE — 85651 RBC SED RATE NONAUTOMATED: CPT | Performed by: NURSE PRACTITIONER

## 2021-12-31 PROCEDURE — 27000207 HC ISOLATION

## 2021-12-31 PROCEDURE — 63600175 PHARM REV CODE 636 W HCPCS: Performed by: NURSE PRACTITIONER

## 2021-12-31 PROCEDURE — 25000003 PHARM REV CODE 250: Performed by: NURSE PRACTITIONER

## 2021-12-31 PROCEDURE — 86141 C-REACTIVE PROTEIN HS: CPT | Performed by: NURSE PRACTITIONER

## 2021-12-31 PROCEDURE — 36415 COLL VENOUS BLD VENIPUNCTURE: CPT | Performed by: NURSE PRACTITIONER

## 2021-12-31 PROCEDURE — 99900035 HC TECH TIME PER 15 MIN (STAT)

## 2021-12-31 RX ADMIN — ENOXAPARIN SODIUM 90 MG: 100 INJECTION SUBCUTANEOUS at 09:12

## 2021-12-31 RX ADMIN — FAMOTIDINE 20 MG: 20 TABLET ORAL at 09:12

## 2021-12-31 RX ADMIN — ALBUTEROL SULFATE 2 PUFF: 90 AEROSOL, METERED RESPIRATORY (INHALATION) at 08:12

## 2021-12-31 RX ADMIN — ALBUTEROL SULFATE 2 PUFF: 90 AEROSOL, METERED RESPIRATORY (INHALATION) at 01:12

## 2021-12-31 RX ADMIN — ALBUTEROL SULFATE 2 PUFF: 90 AEROSOL, METERED RESPIRATORY (INHALATION) at 07:12

## 2021-12-31 RX ADMIN — OXYCODONE HYDROCHLORIDE AND ACETAMINOPHEN 500 MG: 500 TABLET ORAL at 09:12

## 2021-12-31 RX ADMIN — FAMOTIDINE 20 MG: 20 TABLET ORAL at 08:12

## 2021-12-31 RX ADMIN — DEXAMETHASONE 6 MG: 4 TABLET ORAL at 08:12

## 2021-12-31 RX ADMIN — Medication 6 MG: at 09:12

## 2021-12-31 RX ADMIN — LEVOTHYROXINE SODIUM 125 MCG: 125 TABLET ORAL at 08:12

## 2021-12-31 RX ADMIN — ACETAMINOPHEN 650 MG: 325 TABLET ORAL at 09:12

## 2021-12-31 RX ADMIN — ENOXAPARIN SODIUM 90 MG: 100 INJECTION SUBCUTANEOUS at 08:12

## 2021-12-31 RX ADMIN — OXYCODONE HYDROCHLORIDE AND ACETAMINOPHEN 500 MG: 500 TABLET ORAL at 08:12

## 2021-12-31 RX ADMIN — THERA TABS 1 TABLET: TAB at 08:12

## 2021-12-31 NOTE — PROGRESS NOTES
[facility] - Telemetry ([facility])  Pulmonology  Progress Note    Patient Name: Yanira Medina  MRN: [id number]  Admission Date: 12/27/2021  Hospital Length of Stay: 3 days  Code Status: Full Code  Attending Provider: Indu Freeman MD  Primary Care Provider: Juvenal White MD   Principal Problem: Pneumonia due to COVID-19 virus    Subjective:     Interval History: *  12/31: seen and examined: OOB, Stable O2, CXR somewhat better, US liver Normal    Respiratory ROS: positive for - cough and shortness of breath     Objective:     Vital Signs (Most Recent):  Temp: 98.3 °F (36.8 °C) (12/31/21 1151)  Pulse: 81 (12/31/21 1333)  Resp: 18 (12/31/21 1333)  BP: (!) 144/67 (12/31/21 1151)  SpO2: (!) 92 % (12/31/21 1333) Vital Signs (24h Range):  Temp:  [97.8 °F (36.6 °C)-98.3 °F (36.8 °C)] 98.3 °F (36.8 °C)  Pulse:  [52-81] 81  Resp:  [16-20] 18  SpO2:  [91 %-100 %] 92 %  BP: (118-150)/(63-71) 144/67     Weight: 93.5 kg (206 lb 2.1 oz)  Body mass index is 32.28 kg/m².      Intake/Output Summary (Last 24 hours) at 12/31/2021 1407  Last data filed at 12/31/2021 1200  Gross per 24 hour   Intake 480 ml   Output --   Net 480 ml       Physical Exam  Vitals and nursing note reviewed.   Constitutional:       Appearance: She is obese.   HENT:      Mouth/Throat:      Mouth: Mucous membranes are moist.   Eyes:      Extraocular Movements: Extraocular movements intact.      Conjunctiva/sclera: Conjunctivae normal.   Cardiovascular:      Rate and Rhythm: Normal rate and regular rhythm.   Pulmonary:      Effort: Pulmonary effort is normal.   Abdominal:      General: Abdomen is flat.   Musculoskeletal:         General: No swelling.   Skin:     General: Skin is warm and dry.   Neurological:      Mental Status: She is alert and oriented to person, place, and time.   Psychiatric:         Mood and Affect: Mood normal.         Vents:  Oxygen Concentration (%): 50 (12/30/21 0030)    Lines/Drains/Airways     Peripheral Intravenous Line                  Peripheral IV - Single Lumen 12/27/21 1057 20 G Left Hand 4 days         Peripheral IV - Single Lumen 12/27/21 1834 Left Antecubital 3 days                Significant Labs:    CBC/Anemia Profile:  Recent Labs   Lab 12/29/21  1639 12/30/21  0547   WBC  --  6.74   HGB  --  11.7*   HCT  --  36.0*   PLT  --  260   MCV  --  98   RDW  --  12.5   FERRITIN 1,878*  --         Chemistries:  Recent Labs   Lab 12/30/21  0547      K 3.6      CO2 27   BUN 14   CREATININE 0.6   CALCIUM 8.6*   ALBUMIN 2.7*   PROT 6.7   BILITOT 0.4   ALKPHOS 251*   ALT 94*   AST 89*       All pertinent labs within the past 24 hours have been reviewed.    Significant Imaging:  I have reviewed all pertinent imaging results/findings within the past 24 hours.     US Abdomen Limited  Narrative: EXAMINATION:  US ABDOMEN LIMITED    CLINICAL HISTORY:  Abnormal ALT and AST;    FINDINGS:  The liver size and echotexture is normal. No focal liver lesion identified. Gallbladder has been removed. The common duct measures  4  mm. The right kidney measures 12.9cm in long axis and has a normal sonographic appearance. Visualized pancreas and inferior vena cava appear normal. No free fluid in the upper abdomen. Hepatopedal flow noted in the portal vein.  Impression: Normal right upper quadrant ultrasound.    Electronically signed by: Jose J Peña MD  Date:    12/31/2021  Time:    09:36  X-Ray Chest 1 View  Narrative: EXAMINATION:  XR CHEST 1 VIEW    CLINICAL HISTORY:  pneumonia;    TECHNIQUE:  AP view of the chest was performed.    COMPARISON:  12/29/2021    FINDINGS:  Very mild interval improvement of the ground-glass and patchy alveolar infiltrates particularly in the right upper lobe.    No pneumothorax.  No acute osseous findings demonstrated.  Impression: Mild interval improvement.    Electronically signed by: Jose J Peña MD  Date:    12/31/2021  Time:    08:12         ABG  Recent Labs   Lab 12/28/21  1615   PH 7.473*   PO2 51*   PCO2 39.4    HCO3 28.9*   BE 5     Assessment/Plan:     * Pneumonia due to COVID-19 virus  Dexamethasone  REmdesivir, patent declines  BACITRINIB: Only elligible after trial of REMDESIVIR  Continue supportive care  Covid PCR +ve  LMWH  Isolation  Vacination on recovery  CXR improves    Abnormal LFTs  US liver: NORMAL  ? GI input    Abnormality of albumin  Nutrition eval    Protein supplements    Mild intermittent asthma without complication  Albuterol    Alpha-1-antitrypsin deficiency carrier  MZ phenotype  Normal PFT last office visit    Acute hypoxemic respiratory failure  Patient with Hypoxic Respiratory failure which is Acute.  she is not on home oxygen. Supplemental oxygen was provided and noted-  .   Signs/symptoms of respiratory failure include- tachypnea and increased work of breathing. Contributing diagnoses includes - Pneumonia and SARS covid 19 Labs and images were reviewed. Patient Has recent ABG, which has been reviewed. Will treat underlying causes and adjust management of respiratory failure   as follows-      venti mask  Incentive spirometry  Bronchodilators  Home meds ASMANEX    improved     Ovidio Cannon MD  Pulmonology  [facility] - Telemetry ([facility])

## 2021-12-31 NOTE — ASSESSMENT & PLAN NOTE
+ COVID PCR  Normal WBC  Procalcitonin and BNP normal  Sputum culture pending  Supplemental oxygen to maintain sats > 92 %  Incentive spirometer  COVID inflammatory markers elevated  CTA of Chest Large volume solid and subsolid pulmonary opacities   Albuterol inhaler  CXR - worsening of the mixed interstitial and alveolar opacities throughout the periphery of the lungs  12/31 repeat CXR shows mild improvement

## 2021-12-31 NOTE — SUBJECTIVE & OBJECTIVE
Interval History: *  12/31: seen and examined: OOB, Stable O2, CXR somewhat better, US liver Normal    Respiratory ROS: positive for - cough and shortness of breath     Objective:     Vital Signs (Most Recent):  Temp: 98.3 °F (36.8 °C) (12/31/21 1151)  Pulse: 81 (12/31/21 1333)  Resp: 18 (12/31/21 1333)  BP: (!) 144/67 (12/31/21 1151)  SpO2: (!) 92 % (12/31/21 1333) Vital Signs (24h Range):  Temp:  [97.8 °F (36.6 °C)-98.3 °F (36.8 °C)] 98.3 °F (36.8 °C)  Pulse:  [52-81] 81  Resp:  [16-20] 18  SpO2:  [91 %-100 %] 92 %  BP: (118-150)/(63-71) 144/67     Weight: 93.5 kg (206 lb 2.1 oz)  Body mass index is 32.28 kg/m².      Intake/Output Summary (Last 24 hours) at 12/31/2021 1407  Last data filed at 12/31/2021 1200  Gross per 24 hour   Intake 480 ml   Output --   Net 480 ml       Physical Exam  Vitals and nursing note reviewed.   Constitutional:       Appearance: She is obese.   HENT:      Mouth/Throat:      Mouth: Mucous membranes are moist.   Eyes:      Extraocular Movements: Extraocular movements intact.      Conjunctiva/sclera: Conjunctivae normal.   Cardiovascular:      Rate and Rhythm: Normal rate and regular rhythm.   Pulmonary:      Effort: Pulmonary effort is normal.   Abdominal:      General: Abdomen is flat.   Musculoskeletal:         General: No swelling.   Skin:     General: Skin is warm and dry.   Neurological:      Mental Status: She is alert and oriented to person, place, and time.   Psychiatric:         Mood and Affect: Mood normal.         Vents:  Oxygen Concentration (%): 50 (12/30/21 0030)    Lines/Drains/Airways     Peripheral Intravenous Line                 Peripheral IV - Single Lumen 12/27/21 1057 20 G Left Hand 4 days         Peripheral IV - Single Lumen 12/27/21 1834 Left Antecubital 3 days                Significant Labs:    CBC/Anemia Profile:  Recent Labs   Lab 12/29/21  1639 12/30/21  0547   WBC  --  6.74   HGB  --  11.7*   HCT  --  36.0*   PLT  --  260   MCV  --  98   RDW  --  12.5    FERRITIN 1,878*  --         Chemistries:  Recent Labs   Lab 12/30/21  0547      K 3.6      CO2 27   BUN 14   CREATININE 0.6   CALCIUM 8.6*   ALBUMIN 2.7*   PROT 6.7   BILITOT 0.4   ALKPHOS 251*   ALT 94*   AST 89*       All pertinent labs within the past 24 hours have been reviewed.    Significant Imaging:  I have reviewed all pertinent imaging results/findings within the past 24 hours.     US Abdomen Limited  Narrative: EXAMINATION:  US ABDOMEN LIMITED    CLINICAL HISTORY:  Abnormal ALT and AST;    FINDINGS:  The liver size and echotexture is normal. No focal liver lesion identified. Gallbladder has been removed. The common duct measures  4  mm. The right kidney measures 12.9cm in long axis and has a normal sonographic appearance. Visualized pancreas and inferior vena cava appear normal. No free fluid in the upper abdomen. Hepatopedal flow noted in the portal vein.  Impression: Normal right upper quadrant ultrasound.    Electronically signed by: Jose J Peña MD  Date:    12/31/2021  Time:    09:36  X-Ray Chest 1 View  Narrative: EXAMINATION:  XR CHEST 1 VIEW    CLINICAL HISTORY:  pneumonia;    TECHNIQUE:  AP view of the chest was performed.    COMPARISON:  12/29/2021    FINDINGS:  Very mild interval improvement of the ground-glass and patchy alveolar infiltrates particularly in the right upper lobe.    No pneumothorax.  No acute osseous findings demonstrated.  Impression: Mild interval improvement.    Electronically signed by: Jose J Peña MD  Date:    12/31/2021  Time:    08:12

## 2021-12-31 NOTE — PROGRESS NOTES
[facility] Medicine  Progress Note    Patient Name: Yanira Medina  MRN: [id number]  Patient Class: IP- Inpatient   Admission Date: 12/27/2021  Length of Stay: 3 days  Attending Physician: Indu Freeman MD  Primary Care Provider: Juvenal White MD        Subjective:     Principal Problem:Pneumonia due to COVID-19 virus        HPI:  Yanira Medina is a 46 y.o. female who presents with 2-3 days of gradual onset, worsening, severe SOB with associated chest tightness and cough at home.  Multiple family members sick with viral/URI symptoms and one tested positive for COVID-19. Pt explains approx 3 days ago she developed sinus symptoms, severe headache, chills, sweats and had 1 episode of severe vomiting. The symptoms commenced to cough and SOB. Pt denies any recorded fevers. She notes SOB with exertion but reports feeling better as her headache has resolved. In the ED, her pulse ox was 86 % with exercise. COVID rapid screen was negative. COVID marker labs are pending. CXR - scattered pulmonary infiltrates. CTA of Chest is pending for further clarification and to rule out pulmonary embolism. Currently, pt is requiring 3 to 4 liters of oxygen via nasal cannula. Pt denies admission to hospital for asthma exacerbation.   CBC notes a left shift at 90 %, Alk Phos 207, AST 50, ALT 45.  COVID screen was negative  As clarification, on 12/27/2021 patient should be admitted for hospital observation services under my care in collaboration with Indu Freeman MD    Admitting diagnoses include pneumonia and hypoxic respiratory failure.          Overview/Hospital Course:  Worsening hypoxic respiratory failure overnight. Now on 15 L HFNC. Mild tachypnea but no obvious distress or increased work of breathing. COVID PCR pending. COVID inflammatory markers are elevated. Procalcitonin and BNP negative. CTA of Chest negative for PE but large volue solid and subsolid pulmonary opacities  concerning for COVID. Pt with hx of asthma and alpha tripsyn 1. Initially she refused remdesivir infusion but after seen by Dr. Cannon - pt agreeable to receive remdesivir. Strongly suspect COVID pneumonia due to clinical picture and physical exam.   12/29 Pt changed her mind about remdesivir. Also, refused Lovenox therapeutic anticoagulation. Today + COVID screen. Overnight increased oxygen demand due to worsened hypoxia with exertion. Pt wearing both 15 L and venti mask with oxygen at 33 %. SpO2 92 %. Mild tachypnea but no increased work of breathing. CXR worsened. Pt highly motivated with cough, deep breath and use of IS. Explained rationale of remdesevir but pt refuses administration. This was explained by both Nabil Cannon and Xochitl. Decadron and albuterol continued.   12/30 - Pt states she found out that [facility] does not have beds. She is okay with staying here and continuing treatment. She verbalizes understanding of the high level of oxygen requirement. She had an uneventful night and denies any changes in her breathing. Her cough is productive of pale yellow sputum with small amount blood tinged. She is less anxious today - pleasant demeanor. Highly motivated in cough/deep breathing and use of incentive spirometer. Currently on 15 L nasal cannula with  SpO2 95  %. Decadron, albuterol and LMWH in progress. She has mild edema to right foot which patient says is chronic. She denies any leg pain. Slight elevation in liver enzymes from baseline AST 41 >> 89; ALT 38 >> 94.  CRP down; ferritin &  sed rate more elevated.   12/31- Patient OOB, dyspnea improving. HFNC weaned down to 13 L, patient tolerating well, SpO2 93%. CXR reviewed- mild improvement noted. Continue decadron, albuterol and LMWH. Slight elevation in liver enzymes from baseline AST 41 >> 89; ALT 38 >> 94. No acute findings on abdominal US.       Interval History: patient seen and examined. OOB, tolerating 13 L HFNC well. Pt calm and  cooperative on exam.     Review of Systems   Constitutional: Negative for chills and fever.   HENT: Positive for congestion.    Respiratory: Positive for cough and shortness of breath.    Gastrointestinal: Negative for abdominal pain, nausea and vomiting.     Objective:     Vital Signs (Most Recent):  Temp: 98.3 °F (36.8 °C) (12/31/21 1151)  Pulse: 81 (12/31/21 1333)  Resp: 18 (12/31/21 1333)  BP: (!) 144/67 (12/31/21 1151)  SpO2: (!) 92 % (12/31/21 1333) Vital Signs (24h Range):  Temp:  [97.8 °F (36.6 °C)-98.3 °F (36.8 °C)] 98.3 °F (36.8 °C)  Pulse:  [52-81] 81  Resp:  [16-20] 18  SpO2:  [91 %-100 %] 92 %  BP: (118-150)/(63-71) 144/67     Weight: 93.5 kg (206 lb 2.1 oz)  Body mass index is 32.28 kg/m².    Intake/Output Summary (Last 24 hours) at 12/31/2021 1545  Last data filed at 12/31/2021 1200  Gross per 24 hour   Intake 480 ml   Output --   Net 480 ml      Physical Exam  Constitutional:       Appearance: She is well-developed and well-nourished.   HENT:      Head: Normocephalic and atraumatic.   Eyes:      Conjunctiva/sclera: Conjunctivae normal.      Pupils: Pupils are equal, round, and reactive to light.   Neck:      Vascular: No JVD.   Cardiovascular:      Rate and Rhythm: Normal rate and regular rhythm.      Pulses: Intact distal pulses.      Heart sounds: Normal heart sounds.   Pulmonary:      Effort: Pulmonary effort is normal. No respiratory distress.      Breath sounds: No wheezing.      Comments: On 13 L HFNC   Abdominal:      General: Bowel sounds are normal. There is no distension.      Palpations: Abdomen is soft.      Tenderness: There is no abdominal tenderness. There is no guarding.   Musculoskeletal:         General: No tenderness or edema. Normal range of motion.      Cervical back: Normal range of motion.   Skin:     General: Skin is warm and dry.      Capillary Refill: Capillary refill takes less than 2 seconds.   Neurological:      Mental Status: She is alert and oriented to person, place,  and time.   Psychiatric:         Mood and Affect: Mood and affect normal.         Behavior: Behavior normal.         Significant Labs:   All pertinent labs within the past 24 hours have been reviewed.  CBC:   Recent Labs   Lab 12/30/21  0547   WBC 6.74   HGB 11.7*   HCT 36.0*        CMP:   Recent Labs   Lab 12/30/21  0547      K 3.6      CO2 27   GLU 86   BUN 14   CREATININE 0.6   CALCIUM 8.6*   PROT 6.7   ALBUMIN 2.7*   BILITOT 0.4   ALKPHOS 251*   AST 89*   ALT 94*   ANIONGAP 14   EGFRNONAA >60     Significant Imaging: I have reviewed all pertinent imaging results/findings within the past 24 hours.      Assessment/Plan:      * Pneumonia due to COVID-19 virus  + COVID PCR  Normal WBC  Procalcitonin and BNP normal  Sputum culture pending  Supplemental oxygen to maintain sats > 92 %  Incentive spirometer  COVID inflammatory markers elevated  CTA of Chest Large volume solid and subsolid pulmonary opacities   Albuterol inhaler  CXR - worsening of the mixed interstitial and alveolar opacities throughout the periphery of the lungs  12/31 repeat CXR shows mild improvement         Abnormal LFTs  Slight elevation  No acute findings noted on liver US.       Mild intermittent asthma without complication        Alpha-1-antitrypsin deficiency carrier        Acute hypoxemic respiratory failure  Patient with Hypoxic Respiratory failure which is Acute.  she is not on home oxygen. Supplemental oxygen was provided and noted-  .   Signs/symptoms of respiratory failure include- tachypnea. Contributing diagnoses includes - asthma Labs and images were reviewed. Patient Has not had a recent ABG. Will treat underlying causes and adjust management of respiratory failure   Hypoxia secondary to pneumonia  Suspected to be viral in nature - COVID + on 12/29/21  Normal WBC  Procalcitonin and BNP are normal  Sputum culture  Supplemental oxygen to maintain sats > 92 %  Incentive spirometer  CTA of Chest - Large volume solid and  subsolid pulmonary opacities       Adult hypothyroidism  Continue synthroid        VTE Risk Mitigation (From admission, onward)         Ordered     enoxaparin injection 90 mg  2 times daily         12/28/21 0825     IP VTE HIGH RISK PATIENT  Once         12/27/21 1759     Place sequential compression device  Until discontinued         12/27/21 1759                Discharge Planning   SANDRA: 1/3/22    Code Status: Full Code   Is the patient medically ready for discharge?:     Reason for patient still in hospital (select all that apply): Patient trending condition  Discharge Plan A: Home with family                  Gricel Mcgregor NP  Department of Hospital Medicine   [facility] - Tele[facility] ([facility])

## 2021-12-31 NOTE — ASSESSMENT & PLAN NOTE
Dexamethasone  REmdesivir, patent declines  BACITRINIB: Only elligible after trial of REMDESIVIR  Continue supportive care  Covid PCR +ve  LMWH  Isolation  Vacination on recovery  CXR improves

## 2021-12-31 NOTE — SUBJECTIVE & OBJECTIVE
Interval History: patient seen and examined. OOB, tolerating 13 L HFNC well. Pt calm and cooperative on exam.     Review of Systems   Constitutional: Negative for chills and fever.   HENT: Positive for congestion.    Respiratory: Positive for cough and shortness of breath.    Gastrointestinal: Negative for abdominal pain, nausea and vomiting.     Objective:     Vital Signs (Most Recent):  Temp: 98.3 °F (36.8 °C) (12/31/21 1151)  Pulse: 81 (12/31/21 1333)  Resp: 18 (12/31/21 1333)  BP: (!) 144/67 (12/31/21 1151)  SpO2: (!) 92 % (12/31/21 1333) Vital Signs (24h Range):  Temp:  [97.8 °F (36.6 °C)-98.3 °F (36.8 °C)] 98.3 °F (36.8 °C)  Pulse:  [52-81] 81  Resp:  [16-20] 18  SpO2:  [91 %-100 %] 92 %  BP: (118-150)/(63-71) 144/67     Weight: 93.5 kg (206 lb 2.1 oz)  Body mass index is 32.28 kg/m².    Intake/Output Summary (Last 24 hours) at 12/31/2021 1545  Last data filed at 12/31/2021 1200  Gross per 24 hour   Intake 480 ml   Output --   Net 480 ml      Physical Exam  Constitutional:       Appearance: She is well-developed and well-nourished.   HENT:      Head: Normocephalic and atraumatic.   Eyes:      Conjunctiva/sclera: Conjunctivae normal.      Pupils: Pupils are equal, round, and reactive to light.   Neck:      Vascular: No JVD.   Cardiovascular:      Rate and Rhythm: Normal rate and regular rhythm.      Pulses: Intact distal pulses.      Heart sounds: Normal heart sounds.   Pulmonary:      Effort: Pulmonary effort is normal. No respiratory distress.      Breath sounds: No wheezing.      Comments: On 13 L HFNC   Abdominal:      General: Bowel sounds are normal. There is no distension.      Palpations: Abdomen is soft.      Tenderness: There is no abdominal tenderness. There is no guarding.   Musculoskeletal:         General: No tenderness or edema. Normal range of motion.      Cervical back: Normal range of motion.   Skin:     General: Skin is warm and dry.      Capillary Refill: Capillary refill takes less than 2  seconds.   Neurological:      Mental Status: She is alert and oriented to person, place, and time.   Psychiatric:         Mood and Affect: Mood and affect normal.         Behavior: Behavior normal.         Significant Labs:   All pertinent labs within the past 24 hours have been reviewed.  CBC:   Recent Labs   Lab 12/30/21  0547   WBC 6.74   HGB 11.7*   HCT 36.0*        CMP:   Recent Labs   Lab 12/30/21  0547      K 3.6      CO2 27   GLU 86   BUN 14   CREATININE 0.6   CALCIUM 8.6*   PROT 6.7   ALBUMIN 2.7*   BILITOT 0.4   ALKPHOS 251*   AST 89*   ALT 94*   ANIONGAP 14   EGFRNONAA >60     Significant Imaging: I have reviewed all pertinent imaging results/findings within the past 24 hours.

## 2021-12-31 NOTE — PLAN OF CARE
Pt AAO x4.  VSS.  Pt remained afebrile throughout this shift.   Pt remained free of falls this shift.   Pt c/o of no pain this shift.  Plan of care reviewed. Patient verbalizes understanding.   Pt moving/turing q 2 hours. Frequent weight shifting encouraged.  Patient NSR on monitor.  Pt maintained on 13 L high flow.   Bed low, side rails up x 2, wheels locked, call light in reach.   Bed alarm maintained for safety.   Patient instructed to call for assistance.   Hourly rounding completed.   24 hour chart check completed.  Will continue to monitor.

## 2022-01-01 LAB
ALBUMIN SERPL BCP-MCNC: 2.6 G/DL (ref 3.5–5.2)
ALP SERPL-CCNC: 287 U/L (ref 55–135)
ALT SERPL W/O P-5'-P-CCNC: 117 U/L (ref 10–44)
ANION GAP SERPL CALC-SCNC: 11 MMOL/L (ref 8–16)
AST SERPL-CCNC: 101 U/L (ref 10–40)
BASOPHILS # BLD AUTO: 0.02 K/UL (ref 0–0.2)
BASOPHILS NFR BLD: 0.2 % (ref 0–1.9)
BILIRUB SERPL-MCNC: 1 MG/DL (ref 0.1–1)
BUN SERPL-MCNC: 11 MG/DL (ref 6–20)
CALCIUM SERPL-MCNC: 8.5 MG/DL (ref 8.7–10.5)
CHLORIDE SERPL-SCNC: 100 MMOL/L (ref 95–110)
CO2 SERPL-SCNC: 27 MMOL/L (ref 23–29)
CREAT SERPL-MCNC: 0.6 MG/DL (ref 0.5–1.4)
CRP SERPL-MCNC: 100.66 MG/L (ref 0–3.19)
DIFFERENTIAL METHOD: ABNORMAL
EOSINOPHIL # BLD AUTO: 0.1 K/UL (ref 0–0.5)
EOSINOPHIL NFR BLD: 0.7 % (ref 0–8)
ERYTHROCYTE [DISTWIDTH] IN BLOOD BY AUTOMATED COUNT: 12.3 % (ref 11.5–14.5)
EST. GFR  (AFRICAN AMERICAN): >60 ML/MIN/1.73 M^2
EST. GFR  (NON AFRICAN AMERICAN): >60 ML/MIN/1.73 M^2
GLUCOSE SERPL-MCNC: 86 MG/DL (ref 70–110)
HCT VFR BLD AUTO: 36.7 % (ref 37–48.5)
HGB BLD-MCNC: 11.9 G/DL (ref 12–16)
IMM GRANULOCYTES # BLD AUTO: 0.47 K/UL (ref 0–0.04)
IMM GRANULOCYTES NFR BLD AUTO: 4.2 % (ref 0–0.5)
LYMPHOCYTES # BLD AUTO: 0.8 K/UL (ref 1–4.8)
LYMPHOCYTES NFR BLD: 7 % (ref 18–48)
MCH RBC QN AUTO: 31.8 PG (ref 27–31)
MCHC RBC AUTO-ENTMCNC: 32.4 G/DL (ref 32–36)
MCV RBC AUTO: 98 FL (ref 82–98)
MONOCYTES # BLD AUTO: 0.3 K/UL (ref 0.3–1)
MONOCYTES NFR BLD: 2.8 % (ref 4–15)
NEUTROPHILS # BLD AUTO: 9.6 K/UL (ref 1.8–7.7)
NEUTROPHILS NFR BLD: 85.1 % (ref 38–73)
NRBC BLD-RTO: 0 /100 WBC
PLATELET # BLD AUTO: 292 K/UL (ref 150–450)
PMV BLD AUTO: 9.7 FL (ref 9.2–12.9)
POTASSIUM SERPL-SCNC: 3.2 MMOL/L (ref 3.5–5.1)
PROT SERPL-MCNC: 6.5 G/DL (ref 6–8.4)
RBC # BLD AUTO: 3.74 M/UL (ref 4–5.4)
SODIUM SERPL-SCNC: 138 MMOL/L (ref 136–145)
WBC # BLD AUTO: 11.24 K/UL (ref 3.9–12.7)

## 2022-01-01 PROCEDURE — 99232 SBSQ HOSP IP/OBS MODERATE 35: CPT | Mod: ,,, | Performed by: INTERNAL MEDICINE

## 2022-01-01 PROCEDURE — 94640 AIRWAY INHALATION TREATMENT: CPT

## 2022-01-01 PROCEDURE — 36415 COLL VENOUS BLD VENIPUNCTURE: CPT | Performed by: NURSE PRACTITIONER

## 2022-01-01 PROCEDURE — 99232 PR SUBSEQUENT HOSPITAL CARE,LEVL II: ICD-10-PCS | Mod: ,,, | Performed by: INTERNAL MEDICINE

## 2022-01-01 PROCEDURE — 25000003 PHARM REV CODE 250: Performed by: NURSE PRACTITIONER

## 2022-01-01 PROCEDURE — 80053 COMPREHEN METABOLIC PANEL: CPT | Performed by: NURSE PRACTITIONER

## 2022-01-01 PROCEDURE — 94760 N-INVAS EAR/PLS OXIMETRY 1: CPT

## 2022-01-01 PROCEDURE — 27100171 HC OXYGEN HIGH FLOW UP TO 24 HOURS

## 2022-01-01 PROCEDURE — 63600175 PHARM REV CODE 636 W HCPCS: Performed by: NURSE PRACTITIONER

## 2022-01-01 PROCEDURE — 99900035 HC TECH TIME PER 15 MIN (STAT)

## 2022-01-01 PROCEDURE — 85025 COMPLETE CBC W/AUTO DIFF WBC: CPT | Performed by: NURSE PRACTITIONER

## 2022-01-01 PROCEDURE — 27000207 HC ISOLATION

## 2022-01-01 PROCEDURE — 21400001 HC TELEMETRY ROOM

## 2022-01-01 RX ORDER — POTASSIUM CHLORIDE 20 MEQ/1
40 TABLET, EXTENDED RELEASE ORAL 2 TIMES DAILY
Status: DISCONTINUED | OUTPATIENT
Start: 2022-01-01 | End: 2022-01-03

## 2022-01-01 RX ORDER — OXYMETAZOLINE HCL 0.05 %
2 SPRAY, NON-AEROSOL (ML) NASAL 2 TIMES DAILY
Status: COMPLETED | OUTPATIENT
Start: 2022-01-01 | End: 2022-01-03

## 2022-01-01 RX ADMIN — OXYCODONE HYDROCHLORIDE AND ACETAMINOPHEN 500 MG: 500 TABLET ORAL at 09:01

## 2022-01-01 RX ADMIN — ENOXAPARIN SODIUM 90 MG: 100 INJECTION SUBCUTANEOUS at 09:01

## 2022-01-01 RX ADMIN — ALBUTEROL SULFATE 2 PUFF: 90 AEROSOL, METERED RESPIRATORY (INHALATION) at 01:01

## 2022-01-01 RX ADMIN — OXYCODONE HYDROCHLORIDE AND ACETAMINOPHEN 500 MG: 500 TABLET ORAL at 08:01

## 2022-01-01 RX ADMIN — FAMOTIDINE 20 MG: 20 TABLET ORAL at 08:01

## 2022-01-01 RX ADMIN — FAMOTIDINE 20 MG: 20 TABLET ORAL at 09:01

## 2022-01-01 RX ADMIN — Medication 2 SPRAY: at 08:01

## 2022-01-01 RX ADMIN — Medication 6 MG: at 08:01

## 2022-01-01 RX ADMIN — ACETAMINOPHEN 650 MG: 325 TABLET ORAL at 08:01

## 2022-01-01 RX ADMIN — ALBUTEROL SULFATE 2 PUFF: 90 AEROSOL, METERED RESPIRATORY (INHALATION) at 02:01

## 2022-01-01 RX ADMIN — Medication 2 SPRAY: at 12:01

## 2022-01-01 RX ADMIN — LEVOTHYROXINE SODIUM 125 MCG: 125 TABLET ORAL at 09:01

## 2022-01-01 RX ADMIN — ENOXAPARIN SODIUM 90 MG: 100 INJECTION SUBCUTANEOUS at 08:01

## 2022-01-01 RX ADMIN — POTASSIUM CHLORIDE 40 MEQ: 1500 TABLET, EXTENDED RELEASE ORAL at 03:01

## 2022-01-01 RX ADMIN — POTASSIUM CHLORIDE 40 MEQ: 1500 TABLET, EXTENDED RELEASE ORAL at 08:01

## 2022-01-01 RX ADMIN — ALBUTEROL SULFATE 2 PUFF: 90 AEROSOL, METERED RESPIRATORY (INHALATION) at 08:01

## 2022-01-01 RX ADMIN — DEXAMETHASONE 6 MG: 4 TABLET ORAL at 09:01

## 2022-01-01 RX ADMIN — THERA TABS 1 TABLET: TAB at 09:01

## 2022-01-01 NOTE — ASSESSMENT & PLAN NOTE
Patient with Hypoxic Respiratory failure which is Acute.  she is not on home oxygen. Supplemental oxygen was provided and noted- Oxygen Concentration (%):  [100] 100.   Signs/symptoms of respiratory failure include- tachypnea and increased work of breathing. Contributing diagnoses includes - Pneumonia and SARS covid 19 Labs and images were reviewed. Patient Has recent ABG, which has been reviewed. Will treat underlying causes and adjust management of respiratory failure   as follows-      Venti mask  Incentive spirometry  Bronchodilators  Home meds ASMANEX

## 2022-01-01 NOTE — PLAN OF CARE
Ongoing (interventions implemented as appropriate)  Pt is alert and oriented.   VSS  Pt able to make needs known.  Pt remained afebrile throughout this shift.   Pt remained free of falls this shift.   Pt denies pain this shift.  Plan of care reviewed. Patient verbalizes understanding.   Pt moving/turing independent. Frequent weight shifting encouraged.  Patient normal sinus rhythm on monitor.   Bed low, side rails up x 2, wheels locked, call light in reach.   Hourly rounding completed.   Will continue to observe.

## 2022-01-01 NOTE — PROGRESS NOTES
[facility] - Telemetry ([facility])  Pulmonology  Progress Note    Patient Name: Yanira Medina  MRN: [id number]  Admission Date: 12/27/2021  Hospital Length of Stay: 4 days  Code Status: Full Code  Attending Provider: Indu Freeman MD  Primary Care Provider: Juvenal White MD   Principal Problem: Pneumonia due to COVID-19 virus    Subjective:     Interval History: *  01/01/2022: seen and examined, daughter bedside, T max 98.8F, O2 13 LPM    Review of Systems   Respiratory: Positive for shortness of breath.         Objective:     Vital Signs (Most Recent):  Temp: 97.9 °F (36.6 °C) (01/01/22 1123)  Pulse: 83 (01/01/22 1123)  Resp: 18 (01/01/22 1123)  BP: (!) 119/59 (01/01/22 1123)  SpO2: (!) 94 % (01/01/22 1123) Vital Signs (24h Range):  Temp:  [97.3 °F (36.3 °C)-98.8 °F (37.1 °C)] 97.9 °F (36.6 °C)  Pulse:  [65-95] 83  Resp:  [16-22] 18  SpO2:  [88 %-98 %] 94 %  BP: (113-139)/(59-67) 119/59     Weight: 92 kg (202 lb 13.2 oz)  Body mass index is 31.77 kg/m².      Intake/Output Summary (Last 24 hours) at 1/1/2022 1257  Last data filed at 1/1/2022 0600  Gross per 24 hour   Intake 1680 ml   Output 2000 ml   Net -320 ml       Physical Exam  Vitals and nursing note reviewed.   Constitutional:       Appearance: She is obese.   HENT:      Head: Normocephalic.      Mouth/Throat:      Mouth: Mucous membranes are moist.   Eyes:      Extraocular Movements: Extraocular movements intact.      Conjunctiva/sclera: Conjunctivae normal.   Cardiovascular:      Rate and Rhythm: Normal rate and regular rhythm.   Pulmonary:      Effort: Pulmonary effort is normal.      Breath sounds: Rales present.   Abdominal:      General: Abdomen is flat.   Musculoskeletal:         General: No swelling.   Skin:     General: Skin is warm and dry.   Neurological:      Mental Status: She is alert and oriented to person, place, and time.   Psychiatric:         Mood and Affect: Mood normal.         Vents:  Oxygen Concentration (%): 100 (01/01/22  0128)    Lines/Drains/Airways     Peripheral Intravenous Line                 Peripheral IV - Single Lumen 12/27/21 1057 20 G Left Hand 5 days         Peripheral IV - Single Lumen 12/27/21 1834 Left Antecubital 4 days                Significant Labs:    CBC/Anemia Profile:  Recent Labs   Lab 12/31/21  1718 01/01/22  0906   WBC  --  11.24   HGB  --  11.9*   HCT  --  36.7*   PLT  --  292   MCV  --  98   RDW  --  12.3   FERRITIN 1,109*  --         Chemistries:  Recent Labs   Lab 01/01/22  0906      K 3.2*      CO2 27   BUN 11   CREATININE 0.6   CALCIUM 8.5*   ALBUMIN 2.6*   PROT 6.5   BILITOT 1.0   ALKPHOS 287*   *   *       All pertinent labs within the past 24 hours have been reviewed.    Significant Imaging:  I have reviewed all pertinent imaging results/findings within the past 24 hours.     US Abdomen Limited  Narrative: EXAMINATION:  US ABDOMEN LIMITED    CLINICAL HISTORY:  Abnormal ALT and AST;    FINDINGS:  The liver size and echotexture is normal. No focal liver lesion identified. Gallbladder has been removed. The common duct measures  4  mm. The right kidney measures 12.9cm in long axis and has a normal sonographic appearance. Visualized pancreas and inferior vena cava appear normal. No free fluid in the upper abdomen. Hepatopedal flow noted in the portal vein.  Impression: Normal right upper quadrant ultrasound.    Electronically signed by: Jose J Peña MD  Date:    12/31/2021  Time:    09:36  X-Ray Chest 1 View  Narrative: EXAMINATION:  XR CHEST 1 VIEW    CLINICAL HISTORY:  pneumonia;    TECHNIQUE:  AP view of the chest was performed.    COMPARISON:  12/29/2021    FINDINGS:  Very mild interval improvement of the ground-glass and patchy alveolar infiltrates particularly in the right upper lobe.    No pneumothorax.  No acute osseous findings demonstrated.  Impression: Mild interval improvement.    Electronically signed by: Jose J Peña MD  Date:    12/31/2021  Time:    08:12          ABG  Recent Labs   Lab 12/28/21  1615   PH 7.473*   PO2 51*   PCO2 39.4   HCO3 28.9*   BE 5     Assessment/Plan:     * Pneumonia due to COVID-19 virus  Dexamethasone  REmdesivir, patent declines  BACITRINIB: Only elligible after trial of REMDESIVIR  Abn LFT currently may also be contraindication  Continue supportive care  Covid PCR +ve  LMWH  Isolation  Vacination on recovery  CXR improves    Abnormal LFTs  US liver: NORMAL  Alk phose 287 ALT, AST> 100  Monitor    Abnormality of albumin  Nutrition eval    Protein supplements    Mild intermittent asthma without complication  Albuterol    Alpha-1-antitrypsin deficiency carrier  MZ phenotype  Normal PFT last office visit    Acute hypoxemic respiratory failure  Patient with Hypoxic Respiratory failure which is Acute.  she is not on home oxygen. Supplemental oxygen was provided and noted- Oxygen Concentration (%):  [100] 100.   Signs/symptoms of respiratory failure include- tachypnea and increased work of breathing. Contributing diagnoses includes - Pneumonia and SARS covid 19 Labs and images were reviewed. Patient Has recent ABG, which has been reviewed. Will treat underlying causes and adjust management of respiratory failure   as follows-      Venti mask  Incentive spirometry  Bronchodilators  Home meds ASMANEX           Ovidio Cannon MD  Pulmonology  [facility] - Telemetry ([facility])

## 2022-01-01 NOTE — SUBJECTIVE & OBJECTIVE
Interval History:  See Hosp Course    Review of Systems   Constitutional: Positive for activity change. Negative for chills and diaphoresis.   HENT: Negative for congestion.    Eyes: Negative.    Respiratory: Positive for cough, chest tightness and shortness of breath. Negative for wheezing.    Cardiovascular: Negative for chest pain, palpitations and leg swelling.   Gastrointestinal: Negative for abdominal pain, nausea and vomiting. Diarrhea: one episode - large volume.   Genitourinary: Negative.    Musculoskeletal: Negative.    Skin: Negative.    Neurological: Negative for headaches.   Psychiatric/Behavioral: The patient is nervous/anxious.      Objective:     Vital Signs (Most Recent):  Temp: 97.9 °F (36.6 °C) (01/01/22 1123)  Pulse: 73 (01/01/22 1400)  Resp: 18 (01/01/22 1400)  BP: (!) 119/59 (01/01/22 1123)  SpO2: (!) 90 % (01/01/22 1400) Vital Signs (24h Range):  Temp:  [97.3 °F (36.3 °C)-98.8 °F (37.1 °C)] 97.9 °F (36.6 °C)  Pulse:  [65-95] 73  Resp:  [16-22] 18  SpO2:  [88 %-98 %] 90 %  BP: (113-139)/(59-67) 119/59     Weight: 92 kg (202 lb 13.2 oz)  Body mass index is 31.77 kg/m².    Intake/Output Summary (Last 24 hours) at 1/1/2022 1445  Last data filed at 1/1/2022 0600  Gross per 24 hour   Intake 1680 ml   Output 2000 ml   Net -320 ml      Physical Exam  Vitals and nursing note reviewed.   Constitutional:       Appearance: She is well-developed. She is ill-appearing.   HENT:      Head: Normocephalic and atraumatic.      Nose: Nose normal.   Eyes:      Conjunctiva/sclera: Conjunctivae normal.   Cardiovascular:      Rate and Rhythm: Normal rate and regular rhythm.      Heart sounds: Normal heart sounds. No murmur heard.  No friction rub. No gallop.    Pulmonary:      Effort: Tachypnea present. No accessory muscle usage or respiratory distress.      Breath sounds: Examination of the right-lower field reveals rales. Examination of the left-lower field reveals rales. Rales present.      Comments: Left sided  breath sound more decreased than right side  Abdominal:      General: Bowel sounds are normal. There is no distension.      Palpations: Abdomen is soft.      Tenderness: There is no abdominal tenderness.   Musculoskeletal:         General: No tenderness. Normal range of motion.      Cervical back: Normal range of motion and neck supple.   Skin:     General: Skin is warm and dry.   Neurological:      Mental Status: She is alert and oriented to person, place, and time.   Psychiatric:         Behavior: Behavior normal.         Significant Labs:   All pertinent labs within the past 24 hours have been reviewed.  CBC:   Recent Labs   Lab 01/01/22  0906   WBC 11.24   HGB 11.9*   HCT 36.7*        CMP:   Recent Labs   Lab 01/01/22  0906      K 3.2*      CO2 27   GLU 86   BUN 11   CREATININE 0.6   CALCIUM 8.5*   PROT 6.5   ALBUMIN 2.6*   BILITOT 1.0   ALKPHOS 287*   *   *   ANIONGAP 11   EGFRNONAA >60       Significant Imaging: I have reviewed all pertinent imaging results/findings within the past 24 hours.

## 2022-01-01 NOTE — ASSESSMENT & PLAN NOTE
Patient with Hypoxic Respiratory failure which is Acute.  she is not on home oxygen. Supplemental oxygen was provided and noted- Oxygen Concentration (%):  [100] 100.   Signs/symptoms of respiratory failure include- tachypnea. Contributing diagnoses includes - asthma Labs and images were reviewed. Patient Has not had a recent ABG. Will treat underlying causes and adjust management of respiratory failure   Hypoxia secondary to pneumonia  Suspected to be viral in nature - COVID + on 12/29/21  Normal WBC  Procalcitonin and BNP are normal  Sputum culture  Supplemental oxygen to maintain sats > 92 %  Incentive spirometer  CTA of Chest - Large volume solid and subsolid pulmonary opacities

## 2022-01-01 NOTE — PROGRESS NOTES
[facility] Medicine  Progress Note    Patient Name: Yanira Medina  MRN: [id number]  Patient Class: IP- Inpatient   Admission Date: 12/27/2021  Length of Stay: 4 days  Attending Physician: Indu Freeman MD  Primary Care Provider: uJvenal White MD        Subjective:     Principal Problem:Pneumonia due to COVID-19 virus        HPI:  Yanira Medina is a 46 y.o. female who presents with 2-3 days of gradual onset, worsening, severe SOB with associated chest tightness and cough at home.  Multiple family members sick with viral/URI symptoms and one tested positive for COVID-19. Pt explains approx 3 days ago she developed sinus symptoms, severe headache, chills, sweats and had 1 episode of severe vomiting. The symptoms commenced to cough and SOB. Pt denies any recorded fevers. She notes SOB with exertion but reports feeling better as her headache has resolved. In the ED, her pulse ox was 86 % with exercise. COVID rapid screen was negative. COVID marker labs are pending. CXR - scattered pulmonary infiltrates. CTA of Chest is pending for further clarification and to rule out pulmonary embolism. Currently, pt is requiring 3 to 4 liters of oxygen via nasal cannula. Pt denies admission to hospital for asthma exacerbation.   CBC notes a left shift at 90 %, Alk Phos 207, AST 50, ALT 45.  COVID screen was negative  As clarification, on 12/27/2021 patient should be admitted for hospital observation services under my care in collaboration with Indu Freeman MD    Admitting diagnoses include pneumonia and hypoxic respiratory failure.          Overview/Hospital Course:  Worsening hypoxic respiratory failure overnight. Now on 15 L HFNC. Mild tachypnea but no obvious distress or increased work of breathing. COVID PCR pending. COVID inflammatory markers are elevated. Procalcitonin and BNP negative. CTA of Chest negative for PE but large volue solid and subsolid pulmonary opacities  concerning for COVID. Pt with hx of asthma and alpha tripsyn 1. Initially she refused remdesivir infusion but after seen by Dr. Cannon - pt agreeable to receive remdesivir. Strongly suspect COVID pneumonia due to clinical picture and physical exam.   12/29 Pt changed her mind about remdesivir. Also, refused Lovenox therapeutic anticoagulation. Today + COVID screen. Overnight increased oxygen demand due to worsened hypoxia with exertion. Pt wearing both 15 L and venti mask with oxygen at 33 %. SpO2 92 %. Mild tachypnea but no increased work of breathing. CXR worsened. Pt highly motivated with cough, deep breath and use of IS. Explained rationale of remdesevir but pt refuses administration. This was explained by both Nabil Cannon and Xochitl. Decadron and albuterol continued.   12/30 - Pt states she found out that [facility] does not have beds. She is okay with staying here and continuing treatment. She verbalizes understanding of the high level of oxygen requirement. She had an uneventful night and denies any changes in her breathing. Her cough is productive of pale yellow sputum with small amount blood tinged. She is less anxious today - pleasant demeanor. Highly motivated in cough/deep breathing and use of incentive spirometer. Currently on 15 L nasal cannula with  SpO2 95  %. Decadron, albuterol and LMWH in progress. She has mild edema to right foot which patient says is chronic. She denies any leg pain. Slight elevation in liver enzymes from baseline AST 41 >> 89; ALT 38 >> 94.  CRP down; ferritin &  sed rate more elevated.   12/31- Patient OOB, dyspnea improving. HFNC weaned down to 13 L, patient tolerating well, SpO2 93%. CXR reviewed- mild improvement noted. Continue decadron, albuterol and LMWH. Slight elevation in liver enzymes from baseline AST 41 >> 89; ALT 38 >> 94. No acute findings on abdominal US.   1/1/22 - CXR from 12/31 shows very mild improvement of infiltrates. Today, she describes fatigue.  Endorses a productive cough. COVID inflammatory markers are improving. Slight elevation is liver enzymes. Was on 15 L oxygen and now on 13 L oxygen but uses a venti mask on top of the nasal cannula at times. Describes nasal congestion - saline and afrin prescribed.       Interval History:  See Hosp Course    Review of Systems   Constitutional: Positive for activity change. Negative for chills and diaphoresis.   HENT: Negative for congestion.    Eyes: Negative.    Respiratory: Positive for cough, chest tightness and shortness of breath. Negative for wheezing.    Cardiovascular: Negative for chest pain, palpitations and leg swelling.   Gastrointestinal: Negative for abdominal pain, nausea and vomiting. Diarrhea: one episode - large volume.   Genitourinary: Negative.    Musculoskeletal: Negative.    Skin: Negative.    Neurological: Negative for headaches.   Psychiatric/Behavioral: The patient is nervous/anxious.      Objective:     Vital Signs (Most Recent):  Temp: 97.9 °F (36.6 °C) (01/01/22 1123)  Pulse: 73 (01/01/22 1400)  Resp: 18 (01/01/22 1400)  BP: (!) 119/59 (01/01/22 1123)  SpO2: (!) 90 % (01/01/22 1400) Vital Signs (24h Range):  Temp:  [97.3 °F (36.3 °C)-98.8 °F (37.1 °C)] 97.9 °F (36.6 °C)  Pulse:  [65-95] 73  Resp:  [16-22] 18  SpO2:  [88 %-98 %] 90 %  BP: (113-139)/(59-67) 119/59     Weight: 92 kg (202 lb 13.2 oz)  Body mass index is 31.77 kg/m².    Intake/Output Summary (Last 24 hours) at 1/1/2022 1445  Last data filed at 1/1/2022 0600  Gross per 24 hour   Intake 1680 ml   Output 2000 ml   Net -320 ml      Physical Exam  Vitals and nursing note reviewed.   Constitutional:       Appearance: She is well-developed. She is ill-appearing.   HENT:      Head: Normocephalic and atraumatic.      Nose: Nose normal.   Eyes:      Conjunctiva/sclera: Conjunctivae normal.   Cardiovascular:      Rate and Rhythm: Normal rate and regular rhythm.      Heart sounds: Normal heart sounds. No murmur heard.  No friction rub. No  gallop.    Pulmonary:      Effort: Tachypnea present. No accessory muscle usage or respiratory distress.      Breath sounds: Examination of the right-lower field reveals rales. Examination of the left-lower field reveals rales. Rales present.      Comments: Left sided breath sound more decreased than right side  Abdominal:      General: Bowel sounds are normal. There is no distension.      Palpations: Abdomen is soft.      Tenderness: There is no abdominal tenderness.   Musculoskeletal:         General: No tenderness. Normal range of motion.      Cervical back: Normal range of motion and neck supple.   Skin:     General: Skin is warm and dry.   Neurological:      Mental Status: She is alert and oriented to person, place, and time.   Psychiatric:         Behavior: Behavior normal.         Significant Labs:   All pertinent labs within the past 24 hours have been reviewed.  CBC:   Recent Labs   Lab 01/01/22  0906   WBC 11.24   HGB 11.9*   HCT 36.7*        CMP:   Recent Labs   Lab 01/01/22  0906      K 3.2*      CO2 27   GLU 86   BUN 11   CREATININE 0.6   CALCIUM 8.5*   PROT 6.5   ALBUMIN 2.6*   BILITOT 1.0   ALKPHOS 287*   *   *   ANIONGAP 11   EGFRNONAA >60       Significant Imaging: I have reviewed all pertinent imaging results/findings within the past 24 hours.      Assessment/Plan:      * Pneumonia due to COVID-19 virus  + COVID PCR  Normal WBC  Procalcitonin and BNP normal  Sputum culture pending  Supplemental oxygen to maintain sats > 92 %  Incentive spirometer  COVID inflammatory markers elevated  CTA of Chest Large volume solid and subsolid pulmonary opacities   Albuterol inhaler  CXR - worsening of the mixed interstitial and alveolar opacities throughout the periphery of the lungs  12/31 repeat CXR shows mild improvement         Abnormal LFTs  Slowly trending upward  No acute findings noted on liver US.       Abnormality of albumin  Dietitian advised increased protein  intake      Mild intermittent asthma without complication        Alpha-1-antitrypsin deficiency carrier        Acute hypoxemic respiratory failure  Patient with Hypoxic Respiratory failure which is Acute.  she is not on home oxygen. Supplemental oxygen was provided and noted- Oxygen Concentration (%):  [100] 100.   Signs/symptoms of respiratory failure include- tachypnea. Contributing diagnoses includes - asthma Labs and images were reviewed. Patient Has not had a recent ABG. Will treat underlying causes and adjust management of respiratory failure   Hypoxia secondary to pneumonia  Suspected to be viral in nature - COVID + on 12/29/21  Normal WBC  Procalcitonin and BNP are normal  Sputum culture  Supplemental oxygen to maintain sats > 92 %  Incentive spirometer  CTA of Chest - Large volume solid and subsolid pulmonary opacities       Adult hypothyroidism  Continue synthroid        VTE Risk Mitigation (From admission, onward)         Ordered     enoxaparin injection 90 mg  2 times daily         12/28/21 0825     IP VTE HIGH RISK PATIENT  Once         12/27/21 1759     Place sequential compression device  Until discontinued         12/27/21 1759                Discharge Planning   SANDRA:      Code Status: Full Code   Is the patient medically ready for discharge?:     Reason for patient still in hospital (select all that apply): Patient trending condition and Treatment  Discharge Plan A: Home with family                  Deysi Castaneda NP  Department of Hospital Medicine   [facility] - Telemetry ([facility])

## 2022-01-01 NOTE — SUBJECTIVE & OBJECTIVE
Interval History: *  01/01/2022: seen and examined, daughter bedside, T max 98.8F, O2 13 LPM    Review of Systems   Respiratory: Positive for shortness of breath.         Objective:     Vital Signs (Most Recent):  Temp: 97.9 °F (36.6 °C) (01/01/22 1123)  Pulse: 83 (01/01/22 1123)  Resp: 18 (01/01/22 1123)  BP: (!) 119/59 (01/01/22 1123)  SpO2: (!) 94 % (01/01/22 1123) Vital Signs (24h Range):  Temp:  [97.3 °F (36.3 °C)-98.8 °F (37.1 °C)] 97.9 °F (36.6 °C)  Pulse:  [65-95] 83  Resp:  [16-22] 18  SpO2:  [88 %-98 %] 94 %  BP: (113-139)/(59-67) 119/59     Weight: 92 kg (202 lb 13.2 oz)  Body mass index is 31.77 kg/m².      Intake/Output Summary (Last 24 hours) at 1/1/2022 1257  Last data filed at 1/1/2022 0600  Gross per 24 hour   Intake 1680 ml   Output 2000 ml   Net -320 ml       Physical Exam  Vitals and nursing note reviewed.   Constitutional:       Appearance: She is obese.   HENT:      Head: Normocephalic.      Mouth/Throat:      Mouth: Mucous membranes are moist.   Eyes:      Extraocular Movements: Extraocular movements intact.      Conjunctiva/sclera: Conjunctivae normal.   Cardiovascular:      Rate and Rhythm: Normal rate and regular rhythm.   Pulmonary:      Effort: Pulmonary effort is normal.      Breath sounds: Rales present.   Abdominal:      General: Abdomen is flat.   Musculoskeletal:         General: No swelling.   Skin:     General: Skin is warm and dry.   Neurological:      Mental Status: She is alert and oriented to person, place, and time.   Psychiatric:         Mood and Affect: Mood normal.         Vents:  Oxygen Concentration (%): 100 (01/01/22 0128)    Lines/Drains/Airways     Peripheral Intravenous Line                 Peripheral IV - Single Lumen 12/27/21 1057 20 G Left Hand 5 days         Peripheral IV - Single Lumen 12/27/21 1834 Left Antecubital 4 days                Significant Labs:    CBC/Anemia Profile:  Recent Labs   Lab 12/31/21  1718 01/01/22  0906   WBC  --  11.24   HGB  --  11.9*    HCT  --  36.7*   PLT  --  292   MCV  --  98   RDW  --  12.3   FERRITIN 1,109*  --         Chemistries:  Recent Labs   Lab 01/01/22  0906      K 3.2*      CO2 27   BUN 11   CREATININE 0.6   CALCIUM 8.5*   ALBUMIN 2.6*   PROT 6.5   BILITOT 1.0   ALKPHOS 287*   *   *       All pertinent labs within the past 24 hours have been reviewed.    Significant Imaging:  I have reviewed all pertinent imaging results/findings within the past 24 hours.     US Abdomen Limited  Narrative: EXAMINATION:  US ABDOMEN LIMITED    CLINICAL HISTORY:  Abnormal ALT and AST;    FINDINGS:  The liver size and echotexture is normal. No focal liver lesion identified. Gallbladder has been removed. The common duct measures  4  mm. The right kidney measures 12.9cm in long axis and has a normal sonographic appearance. Visualized pancreas and inferior vena cava appear normal. No free fluid in the upper abdomen. Hepatopedal flow noted in the portal vein.  Impression: Normal right upper quadrant ultrasound.    Electronically signed by: Jose J Peña MD  Date:    12/31/2021  Time:    09:36  X-Ray Chest 1 View  Narrative: EXAMINATION:  XR CHEST 1 VIEW    CLINICAL HISTORY:  pneumonia;    TECHNIQUE:  AP view of the chest was performed.    COMPARISON:  12/29/2021    FINDINGS:  Very mild interval improvement of the ground-glass and patchy alveolar infiltrates particularly in the right upper lobe.    No pneumothorax.  No acute osseous findings demonstrated.  Impression: Mild interval improvement.    Electronically signed by: Jose J Peña MD  Date:    12/31/2021  Time:    08:12

## 2022-01-01 NOTE — PLAN OF CARE
POC reviewed with pt. Pt verbalizes understanding of POC. No questions at this time.  AAOx3. NADN.  NSR on cardiac monitor.  O2 @ 13 L HF NC w/ NRB.   Pt remains free of falls.   Tylenol PRN for fever.   Safety measures in place. Will continue to monitor.  Informed pt to call for assistance before getting up. Pt verbalizes understanding.

## 2022-01-01 NOTE — ASSESSMENT & PLAN NOTE
Dexamethasone  REmdesivir, patent declines  BACITRINIB: Only elligible after trial of REMDESIVIR  Abn LFT currently may also be contraindication  Continue supportive care  Covid PCR +ve  LMWH  Isolation  Vacination on recovery  CXR improves

## 2022-01-02 LAB
ALBUMIN SERPL BCP-MCNC: 2.5 G/DL (ref 3.5–5.2)
ALP SERPL-CCNC: 281 U/L (ref 55–135)
ALT SERPL W/O P-5'-P-CCNC: 118 U/L (ref 10–44)
ANION GAP SERPL CALC-SCNC: 15 MMOL/L (ref 8–16)
AST SERPL-CCNC: 79 U/L (ref 10–40)
BASOPHILS # BLD AUTO: 0.06 K/UL (ref 0–0.2)
BASOPHILS NFR BLD: 0.4 % (ref 0–1.9)
BILIRUB SERPL-MCNC: 0.3 MG/DL (ref 0.1–1)
BUN SERPL-MCNC: 13 MG/DL (ref 6–20)
CALCIUM SERPL-MCNC: 8.6 MG/DL (ref 8.7–10.5)
CHLORIDE SERPL-SCNC: 103 MMOL/L (ref 95–110)
CO2 SERPL-SCNC: 19 MMOL/L (ref 23–29)
CREAT SERPL-MCNC: 0.6 MG/DL (ref 0.5–1.4)
DIFFERENTIAL METHOD: ABNORMAL
EOSINOPHIL # BLD AUTO: 0.2 K/UL (ref 0–0.5)
EOSINOPHIL NFR BLD: 1.8 % (ref 0–8)
ERYTHROCYTE [DISTWIDTH] IN BLOOD BY AUTOMATED COUNT: 12.4 % (ref 11.5–14.5)
ERYTHROCYTE [SEDIMENTATION RATE] IN BLOOD BY WESTERGREN METHOD: 111 MM/HR (ref 0–20)
EST. GFR  (AFRICAN AMERICAN): >60 ML/MIN/1.73 M^2
EST. GFR  (NON AFRICAN AMERICAN): >60 ML/MIN/1.73 M^2
FERRITIN SERPL-MCNC: 812 NG/ML (ref 20–300)
GLUCOSE SERPL-MCNC: 66 MG/DL (ref 70–110)
HCT VFR BLD AUTO: 35.8 % (ref 37–48.5)
HGB BLD-MCNC: 11.5 G/DL (ref 12–16)
IMM GRANULOCYTES # BLD AUTO: 0.55 K/UL (ref 0–0.04)
IMM GRANULOCYTES NFR BLD AUTO: 4.1 % (ref 0–0.5)
LDH SERPL L TO P-CCNC: 853 U/L (ref 110–260)
LYMPHOCYTES # BLD AUTO: 1.1 K/UL (ref 1–4.8)
LYMPHOCYTES NFR BLD: 7.9 % (ref 18–48)
MCH RBC QN AUTO: 31.3 PG (ref 27–31)
MCHC RBC AUTO-ENTMCNC: 32.1 G/DL (ref 32–36)
MCV RBC AUTO: 97 FL (ref 82–98)
MONOCYTES # BLD AUTO: 0.5 K/UL (ref 0.3–1)
MONOCYTES NFR BLD: 3.6 % (ref 4–15)
NEUTROPHILS # BLD AUTO: 11.1 K/UL (ref 1.8–7.7)
NEUTROPHILS NFR BLD: 82.2 % (ref 38–73)
NRBC BLD-RTO: 0 /100 WBC
PLATELET # BLD AUTO: 299 K/UL (ref 150–450)
PMV BLD AUTO: 10.4 FL (ref 9.2–12.9)
POTASSIUM SERPL-SCNC: 4.5 MMOL/L (ref 3.5–5.1)
PROT SERPL-MCNC: 6.5 G/DL (ref 6–8.4)
RBC # BLD AUTO: 3.68 M/UL (ref 4–5.4)
SODIUM SERPL-SCNC: 137 MMOL/L (ref 136–145)
WBC # BLD AUTO: 13.5 K/UL (ref 3.9–12.7)

## 2022-01-02 PROCEDURE — 94799 UNLISTED PULMONARY SVC/PX: CPT

## 2022-01-02 PROCEDURE — 25000003 PHARM REV CODE 250: Performed by: NURSE PRACTITIONER

## 2022-01-02 PROCEDURE — 83615 LACTATE (LD) (LDH) ENZYME: CPT | Performed by: NURSE PRACTITIONER

## 2022-01-02 PROCEDURE — 86141 C-REACTIVE PROTEIN HS: CPT | Performed by: NURSE PRACTITIONER

## 2022-01-02 PROCEDURE — 27000249 HC VAPOTHERM CIRCUIT

## 2022-01-02 PROCEDURE — 99900035 HC TECH TIME PER 15 MIN (STAT)

## 2022-01-02 PROCEDURE — 36415 COLL VENOUS BLD VENIPUNCTURE: CPT | Performed by: NURSE PRACTITIONER

## 2022-01-02 PROCEDURE — 82728 ASSAY OF FERRITIN: CPT | Performed by: NURSE PRACTITIONER

## 2022-01-02 PROCEDURE — 94640 AIRWAY INHALATION TREATMENT: CPT

## 2022-01-02 PROCEDURE — 94761 N-INVAS EAR/PLS OXIMETRY MLT: CPT

## 2022-01-02 PROCEDURE — 63600175 PHARM REV CODE 636 W HCPCS: Performed by: NURSE PRACTITIONER

## 2022-01-02 PROCEDURE — 36415 COLL VENOUS BLD VENIPUNCTURE: CPT | Performed by: FAMILY MEDICINE

## 2022-01-02 PROCEDURE — 27000207 HC ISOLATION

## 2022-01-02 PROCEDURE — 80053 COMPREHEN METABOLIC PANEL: CPT | Performed by: NURSE PRACTITIONER

## 2022-01-02 PROCEDURE — 99232 PR SUBSEQUENT HOSPITAL CARE,LEVL II: ICD-10-PCS | Mod: ,,, | Performed by: INTERNAL MEDICINE

## 2022-01-02 PROCEDURE — 94760 N-INVAS EAR/PLS OXIMETRY 1: CPT

## 2022-01-02 PROCEDURE — 27100171 HC OXYGEN HIGH FLOW UP TO 24 HOURS

## 2022-01-02 PROCEDURE — 21400001 HC TELEMETRY ROOM

## 2022-01-02 PROCEDURE — 85651 RBC SED RATE NONAUTOMATED: CPT | Performed by: NURSE PRACTITIONER

## 2022-01-02 PROCEDURE — 82652 VIT D 1 25-DIHYDROXY: CPT | Performed by: NURSE PRACTITIONER

## 2022-01-02 PROCEDURE — 85025 COMPLETE CBC W/AUTO DIFF WBC: CPT | Performed by: FAMILY MEDICINE

## 2022-01-02 PROCEDURE — 99232 SBSQ HOSP IP/OBS MODERATE 35: CPT | Mod: ,,, | Performed by: INTERNAL MEDICINE

## 2022-01-02 RX ADMIN — ENOXAPARIN SODIUM 90 MG: 100 INJECTION SUBCUTANEOUS at 09:01

## 2022-01-02 RX ADMIN — ALBUTEROL SULFATE 2 PUFF: 90 AEROSOL, METERED RESPIRATORY (INHALATION) at 01:01

## 2022-01-02 RX ADMIN — FAMOTIDINE 20 MG: 20 TABLET ORAL at 09:01

## 2022-01-02 RX ADMIN — OXYCODONE HYDROCHLORIDE AND ACETAMINOPHEN 500 MG: 500 TABLET ORAL at 08:01

## 2022-01-02 RX ADMIN — ALBUTEROL SULFATE 2 PUFF: 90 AEROSOL, METERED RESPIRATORY (INHALATION) at 07:01

## 2022-01-02 RX ADMIN — FAMOTIDINE 20 MG: 20 TABLET ORAL at 08:01

## 2022-01-02 RX ADMIN — DEXAMETHASONE 6 MG: 4 TABLET ORAL at 09:01

## 2022-01-02 RX ADMIN — ACETAMINOPHEN 650 MG: 325 TABLET ORAL at 08:01

## 2022-01-02 RX ADMIN — THERA TABS 1 TABLET: TAB at 09:01

## 2022-01-02 RX ADMIN — Medication 2 SPRAY: at 08:01

## 2022-01-02 RX ADMIN — ALBUTEROL SULFATE 2 PUFF: 90 AEROSOL, METERED RESPIRATORY (INHALATION) at 02:01

## 2022-01-02 RX ADMIN — POTASSIUM CHLORIDE 40 MEQ: 1500 TABLET, EXTENDED RELEASE ORAL at 09:01

## 2022-01-02 RX ADMIN — OXYCODONE HYDROCHLORIDE AND ACETAMINOPHEN 500 MG: 500 TABLET ORAL at 09:01

## 2022-01-02 RX ADMIN — Medication 2 SPRAY: at 09:01

## 2022-01-02 RX ADMIN — POTASSIUM CHLORIDE 40 MEQ: 1500 TABLET, EXTENDED RELEASE ORAL at 08:01

## 2022-01-02 RX ADMIN — ALBUTEROL SULFATE 2 PUFF: 90 AEROSOL, METERED RESPIRATORY (INHALATION) at 08:01

## 2022-01-02 RX ADMIN — ENOXAPARIN SODIUM 90 MG: 100 INJECTION SUBCUTANEOUS at 08:01

## 2022-01-02 RX ADMIN — Medication 6 MG: at 08:01

## 2022-01-02 NOTE — ASSESSMENT & PLAN NOTE
-in the setting of viral syndrome   Dietitian advised increased protein intake  -compliance with recommendation encouraged

## 2022-01-02 NOTE — PLAN OF CARE
Ongoing (interventions implemented as appropriate)  Pt alert and oriented.   VSS  Pt able to make needs known.  Pt remained afebrile throughout this shift.   Pt remained free of falls this shift.   Prn tylenol given.   Plan of care reviewed. Patient verbalizes understanding.   Pt moving/turing independent. Frequent weight shifting encouraged.  Patient normal sinus rhythm on monitor.   Bed low, side rails up x 2, wheels locked, call light in reach.   Hourly rounding completed.   Will continue to observe.

## 2022-01-02 NOTE — PROGRESS NOTES
[facility] Medicine  Progress Note    Patient Name: Yanira Medina  MRN: [id number]  Patient Class: IP- Inpatient   Admission Date: 12/27/2021  Length of Stay: 5 days  Attending Physician: Indu Freeman MD  Primary Care Provider: Juvenal White MD        Subjective:     Principal Problem:Pneumonia due to COVID-19 virus        HPI:  Yanira Medina is a 46 y.o. female who presents with 2-3 days of gradual onset, worsening, severe SOB with associated chest tightness and cough at home.  Multiple family members sick with viral/URI symptoms and one tested positive for COVID-19. Pt explains approx 3 days ago she developed sinus symptoms, severe headache, chills, sweats and had 1 episode of severe vomiting. The symptoms commenced to cough and SOB. Pt denies any recorded fevers. She notes SOB with exertion but reports feeling better as her headache has resolved. In the ED, her pulse ox was 86 % with exercise. COVID rapid screen was negative. COVID marker labs are pending. CXR - scattered pulmonary infiltrates. CTA of Chest is pending for further clarification and to rule out pulmonary embolism. Currently, pt is requiring 3 to 4 liters of oxygen via nasal cannula. Pt denies admission to hospital for asthma exacerbation.   CBC notes a left shift at 90 %, Alk Phos 207, AST 50, ALT 45.  COVID screen was negative  As clarification, on 12/27/2021 patient should be admitted for hospital observation services under my care in collaboration with Indu Freeman MD    Admitting diagnoses include pneumonia and hypoxic respiratory failure.          Overview/Hospital Course:  Worsening hypoxic respiratory failure overnight. Now on 15 L HFNC. Mild tachypnea but no obvious distress or increased work of breathing. COVID PCR pending. COVID inflammatory markers are elevated. Procalcitonin and BNP negative. CTA of Chest negative for PE but large volue solid and subsolid pulmonary opacities  concerning for COVID. Pt with hx of asthma and alpha tripsyn 1. Initially she refused remdesivir infusion but after seen by Dr. Cannon - pt agreeable to receive remdesivir. Strongly suspect COVID pneumonia due to clinical picture and physical exam.   12/29 Pt changed her mind about remdesivir. Also, refused Lovenox therapeutic anticoagulation. Today + COVID screen. Overnight increased oxygen demand due to worsened hypoxia with exertion. Pt wearing both 15 L and venti mask with oxygen at 33 %. SpO2 92 %. Mild tachypnea but no increased work of breathing. CXR worsened. Pt highly motivated with cough, deep breath and use of IS. Explained rationale of remdesevir but pt refuses administration. This was explained by both Nabil Cannon and Xochitl. Decadron and albuterol continued.   12/30 - Pt states she found out that [facility] does not have beds. She is okay with staying here and continuing treatment. She verbalizes understanding of the high level of oxygen requirement. She had an uneventful night and denies any changes in her breathing. Her cough is productive of pale yellow sputum with small amount blood tinged. She is less anxious today - pleasant demeanor. Highly motivated in cough/deep breathing and use of incentive spirometer. Currently on 15 L nasal cannula with  SpO2 95  %. Decadron, albuterol and LMWH in progress. She has mild edema to right foot which patient says is chronic. She denies any leg pain. Slight elevation in liver enzymes from baseline AST 41 >> 89; ALT 38 >> 94.  CRP down; ferritin &  sed rate more elevated.   12/31- Patient OOB, dyspnea improving. HFNC weaned down to 13 L, patient tolerating well, SpO2 93%. CXR reviewed- mild improvement noted. Continue decadron, albuterol and LMWH. Slight elevation in liver enzymes from baseline AST 41 >> 89; ALT 38 >> 94. No acute findings on abdominal US.   1/1/22 - CXR from 12/31 shows very mild improvement of infiltrates. Today, she describes fatigue.  Endorses a productive cough. COVID inflammatory markers are improving. Slight elevation is liver enzymes. Was on 15 L oxygen and now on 13 L oxygen but uses a venti mask on top of the nasal cannula at times. Describes nasal congestion - saline and afrin prescribed.   1/2/22- pt sitting up in bed with Vapotherm in place and reports symptom improvement. Leukocytosis noted in the setting of steroid use. Zinc initiated. Vitamin D level pending.  Plan of care discussed with patient and daughter at bedside and understanding verbalized.       Interval History: pt sitting up in bed upon exam and states she is feeling better today. Vapotherm in place. Pt reports trace bilateral ankle edema.  Leukocytosis noted in the setting of steroid use. Vitamin D level pending and Zinc initiated.  Plan of continued as discussed with patient/daughter understanding verbalized.     Review of Systems   Constitutional: Positive for activity change (improved). Negative for chills and diaphoresis.   HENT: Negative for congestion.    Eyes: Negative.    Respiratory: Positive for cough, chest tightness (improved) and shortness of breath. Negative for wheezing.    Cardiovascular: Positive for leg swelling (trace at ankles ). Negative for chest pain and palpitations.   Gastrointestinal: Negative for abdominal pain, nausea and vomiting. Diarrhea: one episode - large volume.   Genitourinary: Negative.    Musculoskeletal: Negative.    Skin: Negative.    Neurological: Negative for headaches.   Psychiatric/Behavioral: The patient is nervous/anxious (improved).      Objective:     Vital Signs (Most Recent):  Temp: 97.2 °F (36.2 °C) (01/02/22 1127)  Pulse: 70 (01/02/22 1127)  Resp: 20 (01/02/22 0857)  BP: (!) 148/68 (01/02/22 1127)  SpO2: (!) 88 % (01/02/22 1127) Vital Signs (24h Range):  Temp:  [97.2 °F (36.2 °C)-99.1 °F (37.3 °C)] 97.2 °F (36.2 °C)  Pulse:  [70-83] 70  Resp:  [18-20] 20  SpO2:  [88 %-94 %] 88 %  BP: ()/(53-93) 148/68     Weight:  91.7 kg (202 lb 2.6 oz)  Body mass index is 31.66 kg/m².  No intake or output data in the 24 hours ending 01/02/22 1229   Physical Exam  Vitals and nursing note reviewed.   Constitutional:       Appearance: She is well-developed. She is ill-appearing.   HENT:      Head: Normocephalic and atraumatic.      Nose: Nose normal.   Eyes:      Conjunctiva/sclera: Conjunctivae normal.   Cardiovascular:      Rate and Rhythm: Normal rate and regular rhythm.      Heart sounds: Normal heart sounds. No murmur heard.  No friction rub. No gallop.    Pulmonary:      Effort: Tachypnea present. No accessory muscle usage or respiratory distress.      Breath sounds: Examination of the right-lower field reveals decreased breath sounds and rales. Examination of the left-lower field reveals decreased breath sounds and rales. Decreased breath sounds and rales present.      Comments: Left sided breath sound more decreased than right side  Abdominal:      General: Bowel sounds are normal. There is no distension.      Palpations: Abdomen is soft.      Tenderness: There is no abdominal tenderness.   Musculoskeletal:         General: Swelling (trace bilateral ankle edema ) present. No tenderness. Normal range of motion.      Cervical back: Normal range of motion and neck supple.   Skin:     General: Skin is warm and dry.   Neurological:      Mental Status: She is alert and oriented to person, place, and time.   Psychiatric:         Behavior: Behavior normal.         Significant Labs:   All pertinent labs within the past 24 hours have been reviewed.  CBC:   Recent Labs   Lab 01/01/22  0906 01/02/22  0747   WBC 11.24 13.50*   HGB 11.9* 11.5*   HCT 36.7* 35.8*    299       Significant Imaging:   Imaging Results          X-Ray Chest AP Portable (Final result)  Result time 12/27/21 09:27:27    Final result by Mateo Wren MD (12/27/21 09:27:27)                 Impression:      Scattered pulmonary infiltrates. Findings are not entirely  specific but can be seen with multifocal airspace disease.      Electronically signed by: Mateo Wren MD  Date:    12/27/2021  Time:    09:27             Narrative:    EXAMINATION:  XR CHEST AP PORTABLE    CLINICAL HISTORY:  Shortness of breath    FINDINGS:  Single view of the chest.  04/24/2018    Cardiac silhouette is normal.  Scattered pulmonary infiltrates. Findings are not entirely specific but can be seen with multifocal airspace disease.  No evidence of pleural effusion or pneumothorax.  Bones appear intact.                                Assessment/Plan:      * Pneumonia due to COVID-19 virus  + COVID PCR  Normal WBC  Procalcitonin and BNP normal  Sputum culture pending  Supplemental oxygen to maintain sats > 92 %  Incentive spirometer  COVID inflammatory markers elevated  CTA of Chest Large volume solid and subsolid pulmonary opacities   Albuterol inhaler  CXR - worsening of the mixed interstitial and alveolar opacities throughout the periphery of the lungs  12/31 repeat CXR shows mild improvement   -1/2/22- Vapotherm in place- will wean as tolerated         Abnormal LFTs  -in the setting of COVID   Slowly trending upward  No acute findings noted on liver US.       Abnormality of albumin  -in the setting of viral syndrome   Dietitian advised increased protein intake  -compliance with recommendation encouraged       Mild intermittent asthma without complication  -Albuterol      Alpha-1-antitrypsin deficiency carrier  MZ phenotype  -outpatient follow up       Acute hypoxemic respiratory failure  Patient with Hypoxic Respiratory failure which is Acute.  she is not on home oxygen. Supplemental oxygen was provided and noted-  .   Signs/symptoms of respiratory failure include- tachypnea. Contributing diagnoses includes - asthma Labs and images were reviewed. Patient Has not had a recent ABG. Will treat underlying causes and adjust management of respiratory failure   Hypoxia secondary to pneumonia  Suspected to  be viral in nature - COVID + on 12/29/21  Normal WBC  Procalcitonin and BNP are normal  Sputum culture  Supplemental oxygen to maintain sats > 92 %- Vapotherm in place- will wean as tolerated   Incentive spirometer  CTA of Chest - Large volume solid and subsolid pulmonary opacities       Adult hypothyroidism  Continue synthroid        VTE Risk Mitigation (From admission, onward)         Ordered     enoxaparin injection 90 mg  2 times daily         12/28/21 0825     IP VTE HIGH RISK PATIENT  Once         12/27/21 1759     Place sequential compression device  Until discontinued         12/27/21 1759                Discharge Planning   SANDRA:      Code Status: Full Code   Is the patient medically ready for discharge?:     Reason for patient still in hospital (select all that apply): Patient trending condition, Laboratory test, Treatment and Consult recommendations  Discharge Plan A: Home with family                  Sangeeta Canseco NP  Department of Hospital Medicine   [facility] - Telemetry ([facility])

## 2022-01-02 NOTE — SUBJECTIVE & OBJECTIVE
Interval History: *  01/02/2022: seen and examined, daughter bedside, T max 99.1F, O2 HFNC 40 LPM, 100%    Respiratory ROS: no cough, shortness of breath, or wheezing    Objective:     Vital Signs (Most Recent):  Temp: 97.2 °F (36.2 °C) (01/02/22 1127)  Pulse: 70 (01/02/22 1127)  Resp: 20 (01/02/22 0857)  BP: (!) 148/68 (01/02/22 1127)  SpO2: (!) 88 % (01/02/22 1127) Vital Signs (24h Range):  Temp:  [97.2 °F (36.2 °C)-99.1 °F (37.3 °C)] 97.2 °F (36.2 °C)  Pulse:  [70-83] 70  Resp:  [18-20] 20  SpO2:  [88 %-94 %] 88 %  BP: ()/(53-93) 148/68     Weight: 91.7 kg (202 lb 2.6 oz)  Body mass index is 31.66 kg/m².    No intake or output data in the 24 hours ending 01/02/22 1331    Physical Exam  Vitals and nursing note reviewed.   Constitutional:       Appearance: She is obese.   HENT:      Head: Normocephalic.      Mouth/Throat:      Mouth: Mucous membranes are moist.   Eyes:      Extraocular Movements: Extraocular movements intact.      Conjunctiva/sclera: Conjunctivae normal.   Cardiovascular:      Rate and Rhythm: Normal rate and regular rhythm.   Pulmonary:      Effort: Pulmonary effort is normal.      Breath sounds: Examination of the right-lower field reveals decreased breath sounds. Examination of the left-lower field reveals decreased breath sounds. Decreased breath sounds present. No rales.   Abdominal:      General: Abdomen is flat.   Musculoskeletal:         General: No swelling.   Skin:     General: Skin is warm and dry.   Neurological:      Mental Status: She is alert and oriented to person, place, and time.   Psychiatric:         Mood and Affect: Mood normal.         Vents:  Oxygen Concentration (%): 100 (01/01/22 0128)    Lines/Drains/Airways     Peripheral Intravenous Line                 Peripheral IV - Single Lumen 12/27/21 1057 20 G Left Hand 6 days         Peripheral IV - Single Lumen 12/27/21 1834 Left Antecubital 5 days                Significant Labs:    CBC/Anemia Profile:  Recent Labs   Lab  12/31/21  1718 01/01/22  0906 01/02/22  0747   WBC  --  11.24 13.50*   HGB  --  11.9* 11.5*   HCT  --  36.7* 35.8*   PLT  --  292 299   MCV  --  98 97   RDW  --  12.3 12.4   FERRITIN 1,109*  --   --         Chemistries:  Recent Labs   Lab 01/01/22  0906 01/02/22  1244    137   K 3.2* 4.5    103   CO2 27 19*   BUN 11 13   CREATININE 0.6 0.6   CALCIUM 8.5* 8.6*   ALBUMIN 2.6* 2.5*   PROT 6.5 6.5   BILITOT 1.0 0.3   ALKPHOS 287* 281*   * 118*   * 79*       All pertinent labs within the past 24 hours have been reviewed.    Significant Imaging:  I have reviewed all pertinent imaging results/findings within the past 24 hours.     US Abdomen Limited  Narrative: EXAMINATION:  US ABDOMEN LIMITED    CLINICAL HISTORY:  Abnormal ALT and AST;    FINDINGS:  The liver size and echotexture is normal. No focal liver lesion identified. Gallbladder has been removed. The common duct measures  4  mm. The right kidney measures 12.9cm in long axis and has a normal sonographic appearance. Visualized pancreas and inferior vena cava appear normal. No free fluid in the upper abdomen. Hepatopedal flow noted in the portal vein.  Impression: Normal right upper quadrant ultrasound.    Electronically signed by: Jose J Peña MD  Date:    12/31/2021  Time:    09:36  X-Ray Chest 1 View  Narrative: EXAMINATION:  XR CHEST 1 VIEW    CLINICAL HISTORY:  pneumonia;    TECHNIQUE:  AP view of the chest was performed.    COMPARISON:  12/29/2021    FINDINGS:  Very mild interval improvement of the ground-glass and patchy alveolar infiltrates particularly in the right upper lobe.    No pneumothorax.  No acute osseous findings demonstrated.  Impression: Mild interval improvement.    Electronically signed by: Jose J Peña MD  Date:    12/31/2021  Time:    08:12

## 2022-01-02 NOTE — SUBJECTIVE & OBJECTIVE
Interval History: pt sitting up in bed upon exam and states she is feeling better today. Vapotherm in place. Pt reports trace bilateral ankle edema.  Leukocytosis noted in the setting of steroid use. Vitamin D level pending and Zinc initiated.  Plan of continued as discussed with patient/daughter understanding verbalized.     Review of Systems   Constitutional: Positive for activity change (improved). Negative for chills and diaphoresis.   HENT: Negative for congestion.    Eyes: Negative.    Respiratory: Positive for cough, chest tightness (improved) and shortness of breath. Negative for wheezing.    Cardiovascular: Positive for leg swelling (trace at ankles ). Negative for chest pain and palpitations.   Gastrointestinal: Negative for abdominal pain, nausea and vomiting. Diarrhea: one episode - large volume.   Genitourinary: Negative.    Musculoskeletal: Negative.    Skin: Negative.    Neurological: Negative for headaches.   Psychiatric/Behavioral: The patient is nervous/anxious (improved).      Objective:     Vital Signs (Most Recent):  Temp: 97.2 °F (36.2 °C) (01/02/22 1127)  Pulse: 70 (01/02/22 1127)  Resp: 20 (01/02/22 0857)  BP: (!) 148/68 (01/02/22 1127)  SpO2: (!) 88 % (01/02/22 1127) Vital Signs (24h Range):  Temp:  [97.2 °F (36.2 °C)-99.1 °F (37.3 °C)] 97.2 °F (36.2 °C)  Pulse:  [70-83] 70  Resp:  [18-20] 20  SpO2:  [88 %-94 %] 88 %  BP: ()/(53-93) 148/68     Weight: 91.7 kg (202 lb 2.6 oz)  Body mass index is 31.66 kg/m².  No intake or output data in the 24 hours ending 01/02/22 1229   Physical Exam  Vitals and nursing note reviewed.   Constitutional:       Appearance: She is well-developed. She is ill-appearing.   HENT:      Head: Normocephalic and atraumatic.      Nose: Nose normal.   Eyes:      Conjunctiva/sclera: Conjunctivae normal.   Cardiovascular:      Rate and Rhythm: Normal rate and regular rhythm.      Heart sounds: Normal heart sounds. No murmur heard.  No friction rub. No gallop.     Pulmonary:      Effort: Tachypnea present. No accessory muscle usage or respiratory distress.      Breath sounds: Examination of the right-lower field reveals decreased breath sounds and rales. Examination of the left-lower field reveals decreased breath sounds and rales. Decreased breath sounds and rales present.      Comments: Left sided breath sound more decreased than right side  Abdominal:      General: Bowel sounds are normal. There is no distension.      Palpations: Abdomen is soft.      Tenderness: There is no abdominal tenderness.   Musculoskeletal:         General: Swelling (trace bilateral ankle edema ) present. No tenderness. Normal range of motion.      Cervical back: Normal range of motion and neck supple.   Skin:     General: Skin is warm and dry.   Neurological:      Mental Status: She is alert and oriented to person, place, and time.   Psychiatric:         Behavior: Behavior normal.         Significant Labs:   All pertinent labs within the past 24 hours have been reviewed.  CBC:   Recent Labs   Lab 01/01/22  0906 01/02/22  0747   WBC 11.24 13.50*   HGB 11.9* 11.5*   HCT 36.7* 35.8*    299       Significant Imaging:   Imaging Results          X-Ray Chest AP Portable (Final result)  Result time 12/27/21 09:27:27    Final result by Mateo Wren MD (12/27/21 09:27:27)                 Impression:      Scattered pulmonary infiltrates. Findings are not entirely specific but can be seen with multifocal airspace disease.      Electronically signed by: Mateo Wren MD  Date:    12/27/2021  Time:    09:27             Narrative:    EXAMINATION:  XR CHEST AP PORTABLE    CLINICAL HISTORY:  Shortness of breath    FINDINGS:  Single view of the chest.  04/24/2018    Cardiac silhouette is normal.  Scattered pulmonary infiltrates. Findings are not entirely specific but can be seen with multifocal airspace disease.  No evidence of pleural effusion or pneumothorax.  Bones appear intact.

## 2022-01-02 NOTE — PROGRESS NOTES
[facility] - Telemetry ([facility])  Pulmonology  Progress Note    Patient Name: Yanira Mdeina  MRN: [id number]  Admission Date: 12/27/2021  Hospital Length of Stay: 5 days  Code Status: Full Code  Attending Provider: Indu Freeman MD  Primary Care Provider: Juvenal White MD   Principal Problem: Pneumonia due to COVID-19 virus    Subjective:     Interval History: *  01/02/2022: seen and examined, daughter bedside, T max 99.1F, O2 HFNC 40 LPM, 100%    Respiratory ROS: no cough, shortness of breath, or wheezing    Objective:     Vital Signs (Most Recent):  Temp: 97.2 °F (36.2 °C) (01/02/22 1127)  Pulse: 70 (01/02/22 1127)  Resp: 20 (01/02/22 0857)  BP: (!) 148/68 (01/02/22 1127)  SpO2: (!) 88 % (01/02/22 1127) Vital Signs (24h Range):  Temp:  [97.2 °F (36.2 °C)-99.1 °F (37.3 °C)] 97.2 °F (36.2 °C)  Pulse:  [70-83] 70  Resp:  [18-20] 20  SpO2:  [88 %-94 %] 88 %  BP: ()/(53-93) 148/68     Weight: 91.7 kg (202 lb 2.6 oz)  Body mass index is 31.66 kg/m².    No intake or output data in the 24 hours ending 01/02/22 1331    Physical Exam  Vitals and nursing note reviewed.   Constitutional:       Appearance: She is obese.   HENT:      Head: Normocephalic.      Mouth/Throat:      Mouth: Mucous membranes are moist.   Eyes:      Extraocular Movements: Extraocular movements intact.      Conjunctiva/sclera: Conjunctivae normal.   Cardiovascular:      Rate and Rhythm: Normal rate and regular rhythm.   Pulmonary:      Effort: Pulmonary effort is normal.      Breath sounds: Examination of the right-lower field reveals decreased breath sounds. Examination of the left-lower field reveals decreased breath sounds. Decreased breath sounds present. No rales.   Abdominal:      General: Abdomen is flat.   Musculoskeletal:         General: No swelling.   Skin:     General: Skin is warm and dry.   Neurological:      Mental Status: She is alert and oriented to person, place, and time.   Psychiatric:         Mood and Affect: Mood  normal.         Vents:  Oxygen Concentration (%): 100 (01/01/22 0128)    Lines/Drains/Airways     Peripheral Intravenous Line                 Peripheral IV - Single Lumen 12/27/21 1057 20 G Left Hand 6 days         Peripheral IV - Single Lumen 12/27/21 1834 Left Antecubital 5 days                Significant Labs:    CBC/Anemia Profile:  Recent Labs   Lab 12/31/21  1718 01/01/22  0906 01/02/22  0747   WBC  --  11.24 13.50*   HGB  --  11.9* 11.5*   HCT  --  36.7* 35.8*   PLT  --  292 299   MCV  --  98 97   RDW  --  12.3 12.4   FERRITIN 1,109*  --   --         Chemistries:  Recent Labs   Lab 01/01/22  0906 01/02/22  1244    137   K 3.2* 4.5    103   CO2 27 19*   BUN 11 13   CREATININE 0.6 0.6   CALCIUM 8.5* 8.6*   ALBUMIN 2.6* 2.5*   PROT 6.5 6.5   BILITOT 1.0 0.3   ALKPHOS 287* 281*   * 118*   * 79*       All pertinent labs within the past 24 hours have been reviewed.    Significant Imaging:  I have reviewed all pertinent imaging results/findings within the past 24 hours.     US Abdomen Limited  Narrative: EXAMINATION:  US ABDOMEN LIMITED    CLINICAL HISTORY:  Abnormal ALT and AST;    FINDINGS:  The liver size and echotexture is normal. No focal liver lesion identified. Gallbladder has been removed. The common duct measures  4  mm. The right kidney measures 12.9cm in long axis and has a normal sonographic appearance. Visualized pancreas and inferior vena cava appear normal. No free fluid in the upper abdomen. Hepatopedal flow noted in the portal vein.  Impression: Normal right upper quadrant ultrasound.    Electronically signed by: Jose J Peña MD  Date:    12/31/2021  Time:    09:36  X-Ray Chest 1 View  Narrative: EXAMINATION:  XR CHEST 1 VIEW    CLINICAL HISTORY:  pneumonia;    TECHNIQUE:  AP view of the chest was performed.    COMPARISON:  12/29/2021    FINDINGS:  Very mild interval improvement of the ground-glass and patchy alveolar infiltrates particularly in the right upper lobe.    No  pneumothorax.  No acute osseous findings demonstrated.  Impression: Mild interval improvement.    Electronically signed by: Jose J Peña MD  Date:    12/31/2021  Time:    08:12         ABG  Recent Labs   Lab 12/28/21  1615   PH 7.473*   PO2 51*   PCO2 39.4   HCO3 28.9*   BE 5     Assessment/Plan:     * Pneumonia due to COVID-19 virus  Dexamethasone  REmdesivir, patent declines  BACITRINIB: Only elligible after trial of REMDESIVIR  Abn LFT currently may also be contraindication  Continue supportive care  Covid PCR +ve  LMWH  Isolation  Vacination on recovery  CXR improves    Abnormal LFTs  US liver: NORMAL  Alk phose 287 ALT, AST> 100  Monitor    Abnormality of albumin  Nutrition eval  Alb 2.5  Protein supplements    Mild intermittent asthma without complication  Albuterol    Alpha-1-antitrypsin deficiency carrier  MZ phenotype  Normal PFT last office visit    Acute hypoxemic respiratory failure  Patient with Hypoxic Respiratory failure which is Acute.  she is not on home oxygen. Supplemental oxygen was provided and noted-  .   Signs/symptoms of respiratory failure include- tachypnea and increased work of breathing. Contributing diagnoses includes - Pneumonia and SARS covid 19 Labs and images were reviewed. Patient Has recent ABG, which has been reviewed. Will treat underlying causes and adjust management of respiratory failure   as follows-      Venti mask  Incentive spirometry  Bronchodilators  Home meds ASMANEX         Ovidio Cannon MD  Pulmonology  [place] - Telemetry ([facility]

## 2022-01-02 NOTE — ASSESSMENT & PLAN NOTE
Patient with Hypoxic Respiratory failure which is Acute.  she is not on home oxygen. Supplemental oxygen was provided and noted-  .   Signs/symptoms of respiratory failure include- tachypnea. Contributing diagnoses includes - asthma Labs and images were reviewed. Patient Has not had a recent ABG. Will treat underlying causes and adjust management of respiratory failure   Hypoxia secondary to pneumonia  Suspected to be viral in nature - COVID + on 12/29/21  Normal WBC  Procalcitonin and BNP are normal  Sputum culture  Supplemental oxygen to maintain sats > 92 %- Vapotherm in place- will wean as tolerated   Incentive spirometer  CTA of Chest - Large volume solid and subsolid pulmonary opacities

## 2022-01-02 NOTE — ASSESSMENT & PLAN NOTE
+ COVID PCR  Normal WBC  Procalcitonin and BNP normal  Sputum culture pending  Supplemental oxygen to maintain sats > 92 %  Incentive spirometer  COVID inflammatory markers elevated  CTA of Chest Large volume solid and subsolid pulmonary opacities   Albuterol inhaler  CXR - worsening of the mixed interstitial and alveolar opacities throughout the periphery of the lungs  12/31 repeat CXR shows mild improvement   -1/2/22- Vapotherm in place- will wean as tolerated

## 2022-01-03 LAB
CRP SERPL-MCNC: 144.07 MG/L (ref 0–3.19)
POCT GLUCOSE: 137 MG/DL (ref 70–110)
POCT GLUCOSE: 143 MG/DL (ref 70–110)

## 2022-01-03 PROCEDURE — 99233 SBSQ HOSP IP/OBS HIGH 50: CPT | Mod: ,,, | Performed by: PHYSICIAN ASSISTANT

## 2022-01-03 PROCEDURE — 27100171 HC OXYGEN HIGH FLOW UP TO 24 HOURS

## 2022-01-03 PROCEDURE — 99233 PR SUBSEQUENT HOSPITAL CARE,LEVL III: ICD-10-PCS | Mod: ,,, | Performed by: PHYSICIAN ASSISTANT

## 2022-01-03 PROCEDURE — 94799 UNLISTED PULMONARY SVC/PX: CPT

## 2022-01-03 PROCEDURE — 25000003 PHARM REV CODE 250: Performed by: NURSE PRACTITIONER

## 2022-01-03 PROCEDURE — 94760 N-INVAS EAR/PLS OXIMETRY 1: CPT

## 2022-01-03 PROCEDURE — 99900035 HC TECH TIME PER 15 MIN (STAT)

## 2022-01-03 PROCEDURE — 94640 AIRWAY INHALATION TREATMENT: CPT

## 2022-01-03 PROCEDURE — 27000207 HC ISOLATION

## 2022-01-03 PROCEDURE — 94761 N-INVAS EAR/PLS OXIMETRY MLT: CPT

## 2022-01-03 PROCEDURE — 63600175 PHARM REV CODE 636 W HCPCS: Performed by: NURSE PRACTITIONER

## 2022-01-03 PROCEDURE — 21400001 HC TELEMETRY ROOM

## 2022-01-03 RX ORDER — ZINC SULFATE 50(220)MG
220 CAPSULE ORAL DAILY
Status: DISCONTINUED | OUTPATIENT
Start: 2022-01-04 | End: 2022-01-08 | Stop reason: HOSPADM

## 2022-01-03 RX ORDER — BUSPIRONE HYDROCHLORIDE 5 MG/1
5 TABLET ORAL NIGHTLY
Status: DISCONTINUED | OUTPATIENT
Start: 2022-01-03 | End: 2022-01-08 | Stop reason: HOSPADM

## 2022-01-03 RX ORDER — GUAIFENESIN 600 MG/1
600 TABLET, EXTENDED RELEASE ORAL 2 TIMES DAILY
Status: DISCONTINUED | OUTPATIENT
Start: 2022-01-03 | End: 2022-01-08 | Stop reason: HOSPADM

## 2022-01-03 RX ADMIN — FAMOTIDINE 20 MG: 20 TABLET ORAL at 09:01

## 2022-01-03 RX ADMIN — GUAIFENESIN 600 MG: 600 TABLET, EXTENDED RELEASE ORAL at 10:01

## 2022-01-03 RX ADMIN — Medication 2 SPRAY: at 10:01

## 2022-01-03 RX ADMIN — ACETAMINOPHEN 650 MG: 325 TABLET ORAL at 01:01

## 2022-01-03 RX ADMIN — ALBUTEROL SULFATE 2 PUFF: 90 AEROSOL, METERED RESPIRATORY (INHALATION) at 07:01

## 2022-01-03 RX ADMIN — THERA TABS 1 TABLET: TAB at 10:01

## 2022-01-03 RX ADMIN — BUSPIRONE HYDROCHLORIDE 5 MG: 5 TABLET ORAL at 09:01

## 2022-01-03 RX ADMIN — POTASSIUM CHLORIDE 40 MEQ: 1500 TABLET, EXTENDED RELEASE ORAL at 10:01

## 2022-01-03 RX ADMIN — FAMOTIDINE 20 MG: 20 TABLET ORAL at 10:01

## 2022-01-03 RX ADMIN — ALBUTEROL SULFATE 2 PUFF: 90 AEROSOL, METERED RESPIRATORY (INHALATION) at 02:01

## 2022-01-03 RX ADMIN — ENOXAPARIN SODIUM 90 MG: 100 INJECTION SUBCUTANEOUS at 10:01

## 2022-01-03 RX ADMIN — Medication 6 MG: at 09:01

## 2022-01-03 RX ADMIN — GUAIFENESIN 600 MG: 600 TABLET, EXTENDED RELEASE ORAL at 09:01

## 2022-01-03 RX ADMIN — ALBUTEROL SULFATE 2 PUFF: 90 AEROSOL, METERED RESPIRATORY (INHALATION) at 01:01

## 2022-01-03 RX ADMIN — ALBUTEROL SULFATE 2 PUFF: 90 AEROSOL, METERED RESPIRATORY (INHALATION) at 08:01

## 2022-01-03 RX ADMIN — DEXAMETHASONE 6 MG: 4 TABLET ORAL at 10:01

## 2022-01-03 RX ADMIN — OXYCODONE HYDROCHLORIDE AND ACETAMINOPHEN 500 MG: 500 TABLET ORAL at 09:01

## 2022-01-03 RX ADMIN — ENOXAPARIN SODIUM 90 MG: 100 INJECTION SUBCUTANEOUS at 09:01

## 2022-01-03 RX ADMIN — Medication 2 SPRAY: at 09:01

## 2022-01-03 RX ADMIN — OXYCODONE HYDROCHLORIDE AND ACETAMINOPHEN 500 MG: 500 TABLET ORAL at 10:01

## 2022-01-03 NOTE — PROGRESS NOTES
[facility] Medicine  Progress Note    Patient Name: Yanira Medina  MRN: [id number]  Patient Class: IP- Inpatient   Admission Date: 12/27/2021  Length of Stay: 6 days  Attending Physician: Joss Gonzalez MD  Primary Care Provider: Juvenal White MD        Subjective:     Principal Problem:Pneumonia due to COVID-19 virus        HPI:  Yanira Medina is a 46 y.o. female who presents with 2-3 days of gradual onset, worsening, severe SOB with associated chest tightness and cough at home.  Multiple family members sick with viral/URI symptoms and one tested positive for COVID-19. Pt explains approx 3 days ago she developed sinus symptoms, severe headache, chills, sweats and had 1 episode of severe vomiting. The symptoms commenced to cough and SOB. Pt denies any recorded fevers. She notes SOB with exertion but reports feeling better as her headache has resolved. In the ED, her pulse ox was 86 % with exercise. COVID rapid screen was negative. COVID marker labs are pending. CXR - scattered pulmonary infiltrates. CTA of Chest is pending for further clarification and to rule out pulmonary embolism. Currently, pt is requiring 3 to 4 liters of oxygen via nasal cannula. Pt denies admission to hospital for asthma exacerbation.   CBC notes a left shift at 90 %, Alk Phos 207, AST 50, ALT 45.  COVID screen was negative  As clarification, on 12/27/2021 patient should be admitted for hospital observation services under my care in collaboration with Indu Freeman MD    Admitting diagnoses include pneumonia and hypoxic respiratory failure.          Overview/Hospital Course:  Worsening hypoxic respiratory failure overnight. Now on 15 L HFNC. Mild tachypnea but no obvious distress or increased work of breathing. COVID PCR pending. COVID inflammatory markers are elevated. Procalcitonin and BNP negative. CTA of Chest negative for PE but large volue solid and subsolid pulmonary opacities concerning  for COVID. Pt with hx of asthma and alpha tripsyn 1. Initially she refused remdesivir infusion but after seen by Dr. Cannon - pt agreeable to receive remdesivir. Strongly suspect COVID pneumonia due to clinical picture and physical exam.   12/29 Pt changed her mind about remdesivir. Also, refused Lovenox therapeutic anticoagulation. Today + COVID screen. Overnight increased oxygen demand due to worsened hypoxia with exertion. Pt wearing both 15 L and venti mask with oxygen at 33 %. SpO2 92 %. Mild tachypnea but no increased work of breathing. CXR worsened. Pt highly motivated with cough, deep breath and use of IS. Explained rationale of remdesevir but pt refuses administration. This was explained by both Nabil Cannon and Xochitl. Decadron and albuterol continued.   12/30 - Pt states she found out that [facility] does not have beds. She is okay with staying here and continuing treatment. She verbalizes understanding of the high level of oxygen requirement. She had an uneventful night and denies any changes in her breathing. Her cough is productive of pale yellow sputum with small amount blood tinged. She is less anxious today - pleasant demeanor. Highly motivated in cough/deep breathing and use of incentive spirometer. Currently on 15 L nasal cannula with  SpO2 95  %. Decadron, albuterol and LMWH in progress. She has mild edema to right foot which patient says is chronic. She denies any leg pain. Slight elevation in liver enzymes from baseline AST 41 >> 89; ALT 38 >> 94.  CRP down; ferritin &  sed rate more elevated.   12/31- Patient OOB, dyspnea improving. HFNC weaned down to 13 L, patient tolerating well, SpO2 93%. CXR reviewed- mild improvement noted. Continue decadron, albuterol and LMWH. Slight elevation in liver enzymes from baseline AST 41 >> 89; ALT 38 >> 94. No acute findings on abdominal US.   1/1/22 - CXR from 12/31 shows very mild improvement of infiltrates. Today, she describes fatigue. Endorses a  productive cough. COVID inflammatory markers are improving. Slight elevation is liver enzymes. Was on 15 L oxygen and now on 13 L oxygen but uses a venti mask on top of the nasal cannula at times. Describes nasal congestion - saline and afrin prescribed.   1/2/22- pt sitting up in bed with Vapotherm in place and reports symptom improvement. Leukocytosis noted in the setting of steroid use. Zinc initiated. Vitamin D level pending.  Plan of care discussed with patient and daughter at bedside and understanding verbalized.   1/3/22- pt sitting in chair and reports compliance with incentive spirometry. Pt remains on Vapotherm with oxygen saturations from 91%-93%. Buspar nightly requested to assist with anxiety.  Pt encouraged to remain in prone position as tolerated with understanding verbalized.  Current plan of care continued as previously prescribed.       Interval History: pt sitting in chair and reports compliance with incentive spirometry. Pt remains on Vapotherm with oxygen saturations from 91%-93%. Buspar nightly requested to assist with anxiety.  Pt encouraged to remain in prone position as tolerated with understanding verbalized.  Current plan of care continued as previously prescribed.     Review of Systems   Constitutional: Positive for activity change (improved). Negative for chills and diaphoresis.   HENT: Negative for congestion.    Eyes: Negative.    Respiratory: Positive for cough, chest tightness (improved) and shortness of breath. Negative for wheezing.    Cardiovascular: Positive for leg swelling (trace at ankles ). Negative for chest pain and palpitations.   Gastrointestinal: Negative for abdominal pain, nausea and vomiting. Diarrhea: one episode - large volume.   Genitourinary: Negative.    Musculoskeletal: Negative.    Skin: Negative.    Neurological: Negative for dizziness, weakness and headaches.   Psychiatric/Behavioral: The patient is nervous/anxious (improved).      Objective:     Vital Signs  (Most Recent):  Temp: 100.1 °F (37.8 °C) (01/03/22 1604)  Pulse: 70 (01/03/22 1604)  Resp: 18 (01/03/22 1604)  BP: 111/60 (01/03/22 1604)  SpO2: (!) 93 % (01/03/22 1604) Vital Signs (24h Range):  Temp:  [97.5 °F (36.4 °C)-100.1 °F (37.8 °C)] 100.1 °F (37.8 °C)  Pulse:  [52-82] 70  Resp:  [16-20] 18  SpO2:  [76 %-100 %] 93 %  BP: (111-139)/(60-75) 111/60     Weight: 90.4 kg (199 lb 4.7 oz)  Body mass index is 31.21 kg/m².  No intake or output data in the 24 hours ending 01/03/22 1615   Physical Exam  Vitals and nursing note reviewed.   Constitutional:       Appearance: She is well-developed. She is ill-appearing.   HENT:      Head: Normocephalic and atraumatic.      Nose: Nose normal.   Eyes:      Conjunctiva/sclera: Conjunctivae normal.   Cardiovascular:      Rate and Rhythm: Normal rate and regular rhythm.      Heart sounds: Normal heart sounds. No murmur heard.  No friction rub. No gallop.    Pulmonary:      Effort: Tachypnea present. No accessory muscle usage or respiratory distress.      Breath sounds: Examination of the right-lower field reveals decreased breath sounds. Examination of the left-lower field reveals decreased breath sounds. Decreased breath sounds present. No rales.      Comments: Left sided breath sound more decreased than right side  Abdominal:      General: Bowel sounds are normal. There is no distension.      Palpations: Abdomen is soft.      Tenderness: There is no abdominal tenderness.   Musculoskeletal:         General: Swelling (trace bilateral ankle edema ) present. No tenderness. Normal range of motion.      Cervical back: Normal range of motion and neck supple.   Skin:     General: Skin is warm and dry.   Neurological:      Mental Status: She is alert and oriented to person, place, and time.   Psychiatric:         Behavior: Behavior normal.         Significant Labs:   All pertinent labs within the past 24 hours have been reviewed.  CBC:   Recent Labs   Lab 01/02/22  0747   WBC 13.50*    HGB 11.5*   HCT 35.8*        CMP:   Recent Labs   Lab 01/02/22  1244      K 4.5      CO2 19*   GLU 66*   BUN 13   CREATININE 0.6   CALCIUM 8.6*   PROT 6.5   ALBUMIN 2.5*   BILITOT 0.3   ALKPHOS 281*   AST 79*   *   ANIONGAP 15   EGFRNONAA >60       Significant Imaging:   Imaging Results          X-Ray Chest AP Portable (Final result)  Result time 12/27/21 09:27:27    Final result by Mateo Wren MD (12/27/21 09:27:27)                 Impression:      Scattered pulmonary infiltrates. Findings are not entirely specific but can be seen with multifocal airspace disease.      Electronically signed by: Mateo Wren MD  Date:    12/27/2021  Time:    09:27             Narrative:    EXAMINATION:  XR CHEST AP PORTABLE    CLINICAL HISTORY:  Shortness of breath    FINDINGS:  Single view of the chest.  04/24/2018    Cardiac silhouette is normal.  Scattered pulmonary infiltrates. Findings are not entirely specific but can be seen with multifocal airspace disease.  No evidence of pleural effusion or pneumothorax.  Bones appear intact.                                Assessment/Plan:      * Pneumonia due to COVID-19 virus  + COVID PCR  Normal WBC  Procalcitonin and BNP normal  Sputum culture pending  Supplemental oxygen to maintain sats > 92 %  Incentive spirometer  COVID inflammatory markers elevated  CTA of Chest Large volume solid and subsolid pulmonary opacities   Albuterol inhaler  CXR - worsening of the mixed interstitial and alveolar opacities throughout the periphery of the lungs  12/31 repeat CXR shows mild improvement   -1/2/22- Vapotherm in place- will wean as tolerated   -1/3/22- Vapotherm continued with O2 sats 91%-93%- prone position encouraged- Pulmonology following         Abnormal LFTs  -in the setting of COVID   Slowly trending upward  No acute findings noted on liver US.       Abnormality of albumin  -in the setting of viral syndrome   Dietitian advised increased protein  intake  -compliance with recommendation encouraged       Mild intermittent asthma without complication  -Albuterol      Alpha-1-antitrypsin deficiency carrier  MZ phenotype  -outpatient follow up       Acute hypoxemic respiratory failure  Patient with Hypoxic Respiratory failure which is Acute.  she is not on home oxygen. Supplemental oxygen was provided and noted- Oxygen Concentration (%):  [100] 100.   Signs/symptoms of respiratory failure include- tachypnea. Contributing diagnoses includes - asthma Labs and images were reviewed. Patient Has not had a recent ABG. Will treat underlying causes and adjust management of respiratory failure   Hypoxia secondary to pneumonia  Suspected to be viral in nature - COVID + on 12/29/21  Normal WBC  Procalcitonin and BNP are normal  Sputum culture  Supplemental oxygen to maintain sats > 92 %- Vapotherm in place- will wean as tolerated   Incentive spirometer  CTA of Chest - Large volume solid and subsolid pulmonary opacities   -Pulmonology following     Adult hypothyroidism  Continue synthroid        VTE Risk Mitigation (From admission, onward)         Ordered     enoxaparin injection 90 mg  2 times daily         12/28/21 0825     IP VTE HIGH RISK PATIENT  Once         12/27/21 1759     Place sequential compression device  Until discontinued         12/27/21 1759                Discharge Planning   SANDRA:      Code Status: Full Code   Is the patient medically ready for discharge?:     Reason for patient still in hospital (select all that apply): Patient trending condition, Laboratory test, Treatment and Consult recommendations  Discharge Plan A: Home with family                  Sangeeta Canseco NP  Department of Hospital Medicine   [facility] - Telemetry ([facility])

## 2022-01-03 NOTE — SUBJECTIVE & OBJECTIVE
Interval History: pt sitting in chair and reports compliance with incentive spirometry. Pt remains on Vapotherm with oxygen saturations from 91%-93%. Buspar nightly requested to assist with anxiety.  Pt encouraged to remain in prone position as tolerated with understanding verbalized.  Current plan of care continued as previously prescribed.     Review of Systems   Constitutional: Positive for activity change (improved). Negative for chills and diaphoresis.   HENT: Negative for congestion.    Eyes: Negative.    Respiratory: Positive for cough, chest tightness (improved) and shortness of breath. Negative for wheezing.    Cardiovascular: Positive for leg swelling (trace at ankles ). Negative for chest pain and palpitations.   Gastrointestinal: Negative for abdominal pain, nausea and vomiting. Diarrhea: one episode - large volume.   Genitourinary: Negative.    Musculoskeletal: Negative.    Skin: Negative.    Neurological: Negative for dizziness, weakness and headaches.   Psychiatric/Behavioral: The patient is nervous/anxious (improved).      Objective:     Vital Signs (Most Recent):  Temp: 100.1 °F (37.8 °C) (01/03/22 1604)  Pulse: 70 (01/03/22 1604)  Resp: 18 (01/03/22 1604)  BP: 111/60 (01/03/22 1604)  SpO2: (!) 93 % (01/03/22 1604) Vital Signs (24h Range):  Temp:  [97.5 °F (36.4 °C)-100.1 °F (37.8 °C)] 100.1 °F (37.8 °C)  Pulse:  [52-82] 70  Resp:  [16-20] 18  SpO2:  [76 %-100 %] 93 %  BP: (111-139)/(60-75) 111/60     Weight: 90.4 kg (199 lb 4.7 oz)  Body mass index is 31.21 kg/m².  No intake or output data in the 24 hours ending 01/03/22 1615   Physical Exam  Vitals and nursing note reviewed.   Constitutional:       Appearance: She is well-developed. She is ill-appearing.   HENT:      Head: Normocephalic and atraumatic.      Nose: Nose normal.   Eyes:      Conjunctiva/sclera: Conjunctivae normal.   Cardiovascular:      Rate and Rhythm: Normal rate and regular rhythm.      Heart sounds: Normal heart sounds. No  murmur heard.  No friction rub. No gallop.    Pulmonary:      Effort: Tachypnea present. No accessory muscle usage or respiratory distress.      Breath sounds: Examination of the right-lower field reveals decreased breath sounds. Examination of the left-lower field reveals decreased breath sounds. Decreased breath sounds present. No rales.      Comments: Left sided breath sound more decreased than right side  Abdominal:      General: Bowel sounds are normal. There is no distension.      Palpations: Abdomen is soft.      Tenderness: There is no abdominal tenderness.   Musculoskeletal:         General: Swelling (trace bilateral ankle edema ) present. No tenderness. Normal range of motion.      Cervical back: Normal range of motion and neck supple.   Skin:     General: Skin is warm and dry.   Neurological:      Mental Status: She is alert and oriented to person, place, and time.   Psychiatric:         Behavior: Behavior normal.         Significant Labs:   All pertinent labs within the past 24 hours have been reviewed.  CBC:   Recent Labs   Lab 01/02/22  0747   WBC 13.50*   HGB 11.5*   HCT 35.8*        CMP:   Recent Labs   Lab 01/02/22  1244      K 4.5      CO2 19*   GLU 66*   BUN 13   CREATININE 0.6   CALCIUM 8.6*   PROT 6.5   ALBUMIN 2.5*   BILITOT 0.3   ALKPHOS 281*   AST 79*   *   ANIONGAP 15   EGFRNONAA >60       Significant Imaging:   Imaging Results          X-Ray Chest AP Portable (Final result)  Result time 12/27/21 09:27:27    Final result by Mateo Wren MD (12/27/21 09:27:27)                 Impression:      Scattered pulmonary infiltrates. Findings are not entirely specific but can be seen with multifocal airspace disease.      Electronically signed by: Mateo Wren MD  Date:    12/27/2021  Time:    09:27             Narrative:    EXAMINATION:  XR CHEST AP PORTABLE    CLINICAL HISTORY:  Shortness of breath    FINDINGS:  Single view of the chest.  04/24/2018    Cardiac  silhouette is normal.  Scattered pulmonary infiltrates. Findings are not entirely specific but can be seen with multifocal airspace disease.  No evidence of pleural effusion or pneumothorax.  Bones appear intact.

## 2022-01-03 NOTE — PLAN OF CARE
Pt oriented x4.  VSS.  Pt remained afebrile throughout this shift.   All meds administered per order.   Pt remained free of falls this shift.   Vapotherm 40/100  Plan of care reviewed. Patient verbalizes understanding.   Pt moving/turning independently.   Patient SB-SR on monitor.   Bed low, side rails up x 2, wheels locked, call light in reach.   Patient instructed to call for assistance.   Hourly rounding completed.

## 2022-01-03 NOTE — PLAN OF CARE
POC reviewed with pt. Pt verbalizes understanding of POC. No questions at this time.  AAOx3. NADN.  NSR on cardiac monitor.  Vapotherm @ 40/100.   Incentive Spirometer.   Sodium Chloride 0.65% Nasal Spray.   Pt remains free of falls.  No complaints at this time.  Safety measures in place. Will continue to monitor.  Informed pt to call for assistance before getting up. Pt verbalizes understanding.

## 2022-01-03 NOTE — ASSESSMENT & PLAN NOTE
Patient with Hypoxic Respiratory failure which is Acute.  she is not on home oxygen. Supplemental oxygen was provided and noted- Oxygen Concentration (%):  [100] 100.   Signs/symptoms of respiratory failure include- tachypnea and increased work of breathing. Contributing diagnoses includes - Pneumonia and SARS covid 19 Labs and images were reviewed. Patient Has recent ABG, which has been reviewed. Will treat underlying causes and adjust management of respiratory failure   as follows-      Supplemental Oxygen keep O2>90%  Incentive spirometry  Bronchodilators  Home meds ASMANEX

## 2022-01-03 NOTE — ASSESSMENT & PLAN NOTE
Patient with Hypoxic Respiratory failure which is Acute.  she is not on home oxygen. Supplemental oxygen was provided and noted- Oxygen Concentration (%):  [100] 100.   Signs/symptoms of respiratory failure include- tachypnea. Contributing diagnoses includes - asthma Labs and images were reviewed. Patient Has not had a recent ABG. Will treat underlying causes and adjust management of respiratory failure   Hypoxia secondary to pneumonia  Suspected to be viral in nature - COVID + on 12/29/21  Normal WBC  Procalcitonin and BNP are normal  Sputum culture  Supplemental oxygen to maintain sats > 92 %- Vapotherm in place- will wean as tolerated   Incentive spirometer  CTA of Chest - Large volume solid and subsolid pulmonary opacities   -Pulmonology following

## 2022-01-03 NOTE — SUBJECTIVE & OBJECTIVE
Interval History:   01/03/2022: seen and examined, daughter bedside, Tmax 99.4F, O2 HFNC 40LPM, incentive spirometry, in good spirits but reports gradual worsening of symptoms throughout hospital course    Review of Systems   Constitutional: Positive for malaise/fatigue. Negative for chills.   Respiratory: Positive for cough and shortness of breath. Negative for hemoptysis and sputum production.    Cardiovascular: Negative for chest pain and leg swelling.   Gastrointestinal: Negative for constipation.   Skin: Negative for rash.   All other systems reviewed and are negative.    Objective:     Vital Signs (Most Recent):  Temp: 99.4 °F (37.4 °C) (01/03/22 1138)  Pulse: 70 (01/03/22 1138)  Resp: 18 (01/03/22 1138)  BP: 120/63 (01/03/22 1138)  SpO2: (!) 93 % (01/03/22 1138) Vital Signs (24h Range):  Temp:  [96.6 °F (35.9 °C)-99.4 °F (37.4 °C)] 99.4 °F (37.4 °C)  Pulse:  [52-82] 70  Resp:  [16-20] 18  SpO2:  [76 %-100 %] 93 %  BP: (102-139)/(58-75) 120/63     Weight: 90.4 kg (199 lb 4.7 oz)  Body mass index is 31.21 kg/m².    No intake or output data in the 24 hours ending 01/03/22 1317    Physical Exam  Vitals reviewed.   Constitutional:       Appearance: She is not toxic-appearing.   HENT:      Mouth/Throat:      Mouth: Mucous membranes are moist.   Eyes:      Extraocular Movements: Extraocular movements intact.      Pupils: Pupils are equal, round, and reactive to light.   Cardiovascular:      Rate and Rhythm: Normal rate and regular rhythm.   Pulmonary:      Effort: Pulmonary effort is normal. No respiratory distress.      Breath sounds: No rhonchi or rales.      Comments: decreased breath sounds throughout  Musculoskeletal:      Cervical back: Normal range of motion and neck supple.      Right lower leg: No edema.      Left lower leg: No edema.   Skin:     General: Skin is warm and dry.   Neurological:      Mental Status: She is alert and oriented to person, place, and time.   Psychiatric:         Mood and Affect:  Mood normal.         Vents:  Oxygen Concentration (%): 100 (01/03/22 0800)    Lines/Drains/Airways     Peripheral Intravenous Line                 Peripheral IV - Single Lumen 12/27/21 1057 20 G Left Hand 7 days         Peripheral IV - Single Lumen 12/27/21 1834 Left Antecubital 6 days                Significant Labs:    CBC/Anemia Profile:  Recent Labs   Lab 01/02/22  0747 01/02/22  1637   WBC 13.50*  --    HGB 11.5*  --    HCT 35.8*  --      --    MCV 97  --    RDW 12.4  --    FERRITIN  --  812*        Chemistries:  Recent Labs   Lab 01/02/22  1244      K 4.5      CO2 19*   BUN 13   CREATININE 0.6   CALCIUM 8.6*   ALBUMIN 2.5*   PROT 6.5   BILITOT 0.3   ALKPHOS 281*   *   AST 79*       All pertinent labs within the past 24 hours have been reviewed.    Significant Imaging:  I have reviewed all pertinent imaging results/findings within the past 24 hours.     X-Ray Chest AP Portable  Narrative: EXAMINATION:  XR CHEST AP PORTABLE    CLINICAL HISTORY:  COVID-19 viral infection.    FINDINGS:  Comparison study 12/31/2021.  Shallow breath.  Again, there are extensive bilateral pulmonary infiltrates with patchy airspace consolidation mid and lower lung distribution, mostly similar to mildly progressive compared to prior study, most notably with progressive consolidation peripheral mid right lung.  Stable cardiomediastinal silhouette.  No pneumothorax.  No pleural fluid collection.  Impression: See above    Electronically signed by: Sorin Lewis MD  Date:    01/02/2022  Time:    13:56

## 2022-01-03 NOTE — PROGRESS NOTES
[facility] - Telemetry ([facility])  Pulmonology  Progress Note    Patient Name: Yanira Medina  MRN: [id number]  Admission Date: 12/27/2021  Hospital Length of Stay: 6 days  Code Status: Full Code  Attending Provider: Joss Gonzalez MD  Primary Care Provider: Juvenal White MD   Principal Problem: Pneumonia due to COVID-19 virus    Subjective:     Interval History:   01/03/2022: seen and examined, daughter bedside, Tmax 99.4F, O2 HFNC 40LPM, incentive spirometry, in good spirits but reports gradual worsening of symptoms throughout hospital course    Review of Systems   Constitutional: Positive for malaise/fatigue. Negative for chills.   Respiratory: Positive for cough and shortness of breath. Negative for hemoptysis and sputum production.    Cardiovascular: Negative for chest pain and leg swelling.   Gastrointestinal: Negative for constipation.   Skin: Negative for rash.   All other systems reviewed and are negative.    Objective:     Vital Signs (Most Recent):  Temp: 99.4 °F (37.4 °C) (01/03/22 1138)  Pulse: 70 (01/03/22 1138)  Resp: 18 (01/03/22 1138)  BP: 120/63 (01/03/22 1138)  SpO2: (!) 93 % (01/03/22 1138) Vital Signs (24h Range):  Temp:  [96.6 °F (35.9 °C)-99.4 °F (37.4 °C)] 99.4 °F (37.4 °C)  Pulse:  [52-82] 70  Resp:  [16-20] 18  SpO2:  [76 %-100 %] 93 %  BP: (102-139)/(58-75) 120/63     Weight: 90.4 kg (199 lb 4.7 oz)  Body mass index is 31.21 kg/m².    No intake or output data in the 24 hours ending 01/03/22 1317    Physical Exam  Vitals reviewed.   Constitutional:       Appearance: She is not toxic-appearing.   HENT:      Mouth/Throat:      Mouth: Mucous membranes are moist.   Eyes:      Extraocular Movements: Extraocular movements intact.      Pupils: Pupils are equal, round, and reactive to light.   Cardiovascular:      Rate and Rhythm: Normal rate and regular rhythm.   Pulmonary:      Effort: Pulmonary effort is normal. No respiratory distress.      Breath sounds: No rhonchi or rales.      Comments:  decreased breath sounds throughout  Musculoskeletal:      Cervical back: Normal range of motion and neck supple.      Right lower leg: No edema.      Left lower leg: No edema.   Skin:     General: Skin is warm and dry.   Neurological:      Mental Status: She is alert and oriented to person, place, and time.   Psychiatric:         Mood and Affect: Mood normal.         Vents:  Oxygen Concentration (%): 100 (01/03/22 0800)    Lines/Drains/Airways     Peripheral Intravenous Line                 Peripheral IV - Single Lumen 12/27/21 1057 20 G Left Hand 7 days         Peripheral IV - Single Lumen 12/27/21 1834 Left Antecubital 6 days                Significant Labs:    CBC/Anemia Profile:  Recent Labs   Lab 01/02/22  0747 01/02/22  1637   WBC 13.50*  --    HGB 11.5*  --    HCT 35.8*  --      --    MCV 97  --    RDW 12.4  --    FERRITIN  --  812*        Chemistries:  Recent Labs   Lab 01/02/22  1244      K 4.5      CO2 19*   BUN 13   CREATININE 0.6   CALCIUM 8.6*   ALBUMIN 2.5*   PROT 6.5   BILITOT 0.3   ALKPHOS 281*   *   AST 79*       All pertinent labs within the past 24 hours have been reviewed.    Significant Imaging:  I have reviewed all pertinent imaging results/findings within the past 24 hours.     X-Ray Chest AP Portable  Narrative: EXAMINATION:  XR CHEST AP PORTABLE    CLINICAL HISTORY:  COVID-19 viral infection.    FINDINGS:  Comparison study 12/31/2021.  Shallow breath.  Again, there are extensive bilateral pulmonary infiltrates with patchy airspace consolidation mid and lower lung distribution, mostly similar to mildly progressive compared to prior study, most notably with progressive consolidation peripheral mid right lung.  Stable cardiomediastinal silhouette.  No pneumothorax.  No pleural fluid collection.  Impression: See above    Electronically signed by: Sorin Lewis MD  Date:    01/02/2022  Time:    13:56          ABG  Recent Labs   Lab 12/28/21  1615   PH 7.473*   PO2 51*    PCO2 39.4   HCO3 28.9*   BE 5     Assessment/Plan:     * Pneumonia due to COVID-19 virus  Dexamethasone  REmdesivir, patent declines  BACITRINIB: Only elligible after trial of REMDESIVIR  Abn LFT currently may also be contraindication  Continue supportive care  Covid PCR +ve  LMWH  Isolation  Vacination on recovery  CXR improves    Mild intermittent asthma without complication  Albuterol    Alpha-1-antitrypsin deficiency carrier  MZ phenotype  Normal PFT last office visit    Acute hypoxemic respiratory failure  Patient with Hypoxic Respiratory failure which is Acute.  she is not on home oxygen. Supplemental oxygen was provided and noted- Oxygen Concentration (%):  [100] 100.   Signs/symptoms of respiratory failure include- tachypnea and increased work of breathing. Contributing diagnoses includes - Pneumonia and SARS covid 19 Labs and images were reviewed. Patient Has recent ABG, which has been reviewed. Will treat underlying causes and adjust management of respiratory failure   as follows-      Supplemental Oxygen keep O2>90%  Incentive spirometry  Bronchodilators  Home meds ASMANEX         Urimla Dejesus PA-C  Pulmonology  [facility] - Telemetry ([facility])

## 2022-01-03 NOTE — ASSESSMENT & PLAN NOTE
+ COVID PCR  Normal WBC  Procalcitonin and BNP normal  Sputum culture pending  Supplemental oxygen to maintain sats > 92 %  Incentive spirometer  COVID inflammatory markers elevated  CTA of Chest Large volume solid and subsolid pulmonary opacities   Albuterol inhaler  CXR - worsening of the mixed interstitial and alveolar opacities throughout the periphery of the lungs  12/31 repeat CXR shows mild improvement   -1/2/22- Vapotherm in place- will wean as tolerated   -1/3/22- Vapotherm continued- prone position encouraged- Pulmonology following

## 2022-01-04 LAB
1,25(OH)2D3 SERPL-MCNC: 79 PG/ML (ref 20–79)
ALBUMIN SERPL BCP-MCNC: 2.6 G/DL (ref 3.5–5.2)
ALP SERPL-CCNC: 346 U/L (ref 55–135)
ALT SERPL W/O P-5'-P-CCNC: 93 U/L (ref 10–44)
ANION GAP SERPL CALC-SCNC: 13 MMOL/L (ref 8–16)
AST SERPL-CCNC: 47 U/L (ref 10–40)
BASOPHILS # BLD AUTO: 0.01 K/UL (ref 0–0.2)
BASOPHILS NFR BLD: 0.1 % (ref 0–1.9)
BILIRUB SERPL-MCNC: 0.4 MG/DL (ref 0.1–1)
BUN SERPL-MCNC: 12 MG/DL (ref 6–20)
CALCIUM SERPL-MCNC: 8.8 MG/DL (ref 8.7–10.5)
CHLORIDE SERPL-SCNC: 102 MMOL/L (ref 95–110)
CO2 SERPL-SCNC: 25 MMOL/L (ref 23–29)
CREAT SERPL-MCNC: 0.6 MG/DL (ref 0.5–1.4)
DIFFERENTIAL METHOD: ABNORMAL
EOSINOPHIL # BLD AUTO: 0.2 K/UL (ref 0–0.5)
EOSINOPHIL NFR BLD: 2 % (ref 0–8)
ERYTHROCYTE [DISTWIDTH] IN BLOOD BY AUTOMATED COUNT: 12.5 % (ref 11.5–14.5)
ERYTHROCYTE [SEDIMENTATION RATE] IN BLOOD BY WESTERGREN METHOD: 132 MM/HR (ref 0–20)
EST. GFR  (AFRICAN AMERICAN): >60 ML/MIN/1.73 M^2
EST. GFR  (NON AFRICAN AMERICAN): >60 ML/MIN/1.73 M^2
FERRITIN SERPL-MCNC: 794 NG/ML (ref 20–300)
GLUCOSE SERPL-MCNC: 78 MG/DL (ref 70–110)
HCT VFR BLD AUTO: 36.6 % (ref 37–48.5)
HGB BLD-MCNC: 12 G/DL (ref 12–16)
IMM GRANULOCYTES # BLD AUTO: 0.31 K/UL (ref 0–0.04)
IMM GRANULOCYTES NFR BLD AUTO: 2.6 % (ref 0–0.5)
LDH SERPL L TO P-CCNC: 760 U/L (ref 110–260)
LYMPHOCYTES # BLD AUTO: 1.4 K/UL (ref 1–4.8)
LYMPHOCYTES NFR BLD: 11.4 % (ref 18–48)
MCH RBC QN AUTO: 31.7 PG (ref 27–31)
MCHC RBC AUTO-ENTMCNC: 32.8 G/DL (ref 32–36)
MCV RBC AUTO: 97 FL (ref 82–98)
MONOCYTES # BLD AUTO: 0.4 K/UL (ref 0.3–1)
MONOCYTES NFR BLD: 3.1 % (ref 4–15)
NEUTROPHILS # BLD AUTO: 9.8 K/UL (ref 1.8–7.7)
NEUTROPHILS NFR BLD: 80.8 % (ref 38–73)
NRBC BLD-RTO: 0 /100 WBC
PLATELET # BLD AUTO: 326 K/UL (ref 150–450)
PMV BLD AUTO: 10 FL (ref 9.2–12.9)
POCT GLUCOSE: 110 MG/DL (ref 70–110)
POCT GLUCOSE: 110 MG/DL (ref 70–110)
POCT GLUCOSE: 127 MG/DL (ref 70–110)
POCT GLUCOSE: 128 MG/DL (ref 70–110)
POCT GLUCOSE: 79 MG/DL (ref 70–110)
POTASSIUM SERPL-SCNC: 3.7 MMOL/L (ref 3.5–5.1)
PROT SERPL-MCNC: 7.1 G/DL (ref 6–8.4)
RBC # BLD AUTO: 3.79 M/UL (ref 4–5.4)
SODIUM SERPL-SCNC: 140 MMOL/L (ref 136–145)
WBC # BLD AUTO: 12.13 K/UL (ref 3.9–12.7)

## 2022-01-04 PROCEDURE — 63600175 PHARM REV CODE 636 W HCPCS: Performed by: NURSE PRACTITIONER

## 2022-01-04 PROCEDURE — 82728 ASSAY OF FERRITIN: CPT | Performed by: NURSE PRACTITIONER

## 2022-01-04 PROCEDURE — 27100171 HC OXYGEN HIGH FLOW UP TO 24 HOURS

## 2022-01-04 PROCEDURE — 21400001 HC TELEMETRY ROOM

## 2022-01-04 PROCEDURE — 36415 COLL VENOUS BLD VENIPUNCTURE: CPT | Performed by: NURSE PRACTITIONER

## 2022-01-04 PROCEDURE — 94640 AIRWAY INHALATION TREATMENT: CPT

## 2022-01-04 PROCEDURE — 25000003 PHARM REV CODE 250: Performed by: NURSE PRACTITIONER

## 2022-01-04 PROCEDURE — 94799 UNLISTED PULMONARY SVC/PX: CPT

## 2022-01-04 PROCEDURE — 27000207 HC ISOLATION

## 2022-01-04 PROCEDURE — 83615 LACTATE (LD) (LDH) ENZYME: CPT | Performed by: NURSE PRACTITIONER

## 2022-01-04 PROCEDURE — 86141 C-REACTIVE PROTEIN HS: CPT | Performed by: NURSE PRACTITIONER

## 2022-01-04 PROCEDURE — 85025 COMPLETE CBC W/AUTO DIFF WBC: CPT | Performed by: NURSE PRACTITIONER

## 2022-01-04 PROCEDURE — 80053 COMPREHEN METABOLIC PANEL: CPT | Performed by: NURSE PRACTITIONER

## 2022-01-04 PROCEDURE — 99900035 HC TECH TIME PER 15 MIN (STAT)

## 2022-01-04 PROCEDURE — 85651 RBC SED RATE NONAUTOMATED: CPT | Performed by: NURSE PRACTITIONER

## 2022-01-04 PROCEDURE — 94761 N-INVAS EAR/PLS OXIMETRY MLT: CPT

## 2022-01-04 RX ORDER — CHOLECALCIFEROL (VITAMIN D3) 25 MCG
1000 TABLET ORAL DAILY
Status: DISCONTINUED | OUTPATIENT
Start: 2022-01-04 | End: 2022-01-08 | Stop reason: HOSPADM

## 2022-01-04 RX ADMIN — BUSPIRONE HYDROCHLORIDE 5 MG: 5 TABLET ORAL at 08:01

## 2022-01-04 RX ADMIN — Medication 6 MG: at 08:01

## 2022-01-04 RX ADMIN — FAMOTIDINE 20 MG: 20 TABLET ORAL at 10:01

## 2022-01-04 RX ADMIN — FAMOTIDINE 20 MG: 20 TABLET ORAL at 08:01

## 2022-01-04 RX ADMIN — ENOXAPARIN SODIUM 90 MG: 100 INJECTION SUBCUTANEOUS at 10:01

## 2022-01-04 RX ADMIN — GUAIFENESIN 600 MG: 600 TABLET, EXTENDED RELEASE ORAL at 10:01

## 2022-01-04 RX ADMIN — ACETAMINOPHEN 650 MG: 325 TABLET ORAL at 08:01

## 2022-01-04 RX ADMIN — OXYCODONE HYDROCHLORIDE AND ACETAMINOPHEN 500 MG: 500 TABLET ORAL at 08:01

## 2022-01-04 RX ADMIN — Medication 1000 UNITS: at 10:01

## 2022-01-04 RX ADMIN — ALBUTEROL SULFATE 2 PUFF: 90 AEROSOL, METERED RESPIRATORY (INHALATION) at 12:01

## 2022-01-04 RX ADMIN — THERA TABS 1 TABLET: TAB at 10:01

## 2022-01-04 RX ADMIN — GUAIFENESIN 600 MG: 600 TABLET, EXTENDED RELEASE ORAL at 08:01

## 2022-01-04 RX ADMIN — ACETAMINOPHEN 650 MG: 325 TABLET ORAL at 03:01

## 2022-01-04 RX ADMIN — ACETAMINOPHEN 650 MG: 325 TABLET ORAL at 10:01

## 2022-01-04 RX ADMIN — ENOXAPARIN SODIUM 90 MG: 100 INJECTION SUBCUTANEOUS at 08:01

## 2022-01-04 RX ADMIN — OXYCODONE HYDROCHLORIDE AND ACETAMINOPHEN 500 MG: 500 TABLET ORAL at 10:01

## 2022-01-04 RX ADMIN — ALBUTEROL SULFATE 2 PUFF: 90 AEROSOL, METERED RESPIRATORY (INHALATION) at 07:01

## 2022-01-04 RX ADMIN — DEXAMETHASONE 6 MG: 4 TABLET ORAL at 10:01

## 2022-01-04 RX ADMIN — ZINC SULFATE 220 MG (50 MG) CAPSULE 220 MG: CAPSULE at 10:01

## 2022-01-04 NOTE — SUBJECTIVE & OBJECTIVE
Interval History: pt sitting up to side of bed and states that she is feeling fine today.  Pt reports increased DYSPNEA with movement and activity. Vapotherm in place with O2 sats 95%-91%- will wean as tolerated. Current plan of care continued as prescribed.     Review of Systems   Constitutional: Positive for activity change (improved). Negative for chills and diaphoresis.   HENT: Negative for congestion.    Eyes: Negative.    Respiratory: Positive for cough, chest tightness (improved) and shortness of breath. Negative for wheezing.    Cardiovascular: Positive for leg swelling (trace at ankles ). Negative for chest pain and palpitations.   Gastrointestinal: Negative for abdominal pain, diarrhea, nausea and vomiting.   Genitourinary: Negative.    Musculoskeletal: Negative.    Skin: Negative.    Neurological: Negative for dizziness, weakness and headaches.   Psychiatric/Behavioral: The patient is nervous/anxious (improved).      Objective:     Vital Signs (Most Recent):  Temp: 97.9 °F (36.6 °C) (01/04/22 1117)  Pulse: 82 (01/04/22 1256)  Resp: 16 (01/04/22 1256)  BP: (!) 99/50 (01/04/22 1117)  SpO2: (!) 93 % (01/04/22 1256) Vital Signs (24h Range):  Temp:  [97.7 °F (36.5 °C)-100.1 °F (37.8 °C)] 97.9 °F (36.6 °C)  Pulse:  [54-82] 82  Resp:  [16-20] 16  SpO2:  [88 %-99 %] 93 %  BP: ()/(50-72) 99/50     Weight: 90.4 kg (199 lb 4.7 oz)  Body mass index is 31.21 kg/m².    Intake/Output Summary (Last 24 hours) at 1/4/2022 1430  Last data filed at 1/3/2022 1800  Gross per 24 hour   Intake 350 ml   Output --   Net 350 ml      Physical Exam  Vitals and nursing note reviewed.   Constitutional:       Appearance: She is well-developed. She is ill-appearing.   HENT:      Head: Normocephalic and atraumatic.      Nose: Nose normal.   Eyes:      Conjunctiva/sclera: Conjunctivae normal.   Cardiovascular:      Rate and Rhythm: Normal rate and regular rhythm.      Heart sounds: Normal heart sounds. No murmur heard.  No friction  rub. No gallop.    Pulmonary:      Effort: Tachypnea present. No accessory muscle usage or respiratory distress.      Breath sounds: Examination of the right-lower field reveals decreased breath sounds. Examination of the left-lower field reveals decreased breath sounds. Decreased breath sounds present. No rales.      Comments: Left sided breath sound more decreased than right side  Abdominal:      General: Bowel sounds are normal. There is no distension.      Palpations: Abdomen is soft.      Tenderness: There is no abdominal tenderness.   Genitourinary:     Comments: Deferred   Musculoskeletal:         General: No swelling or tenderness. Normal range of motion.      Cervical back: Normal range of motion and neck supple.   Skin:     General: Skin is warm and dry.   Neurological:      Mental Status: She is alert and oriented to person, place, and time.   Psychiatric:         Behavior: Behavior normal.         Significant Labs:   All pertinent labs within the past 24 hours have been reviewed.  CBC:   Recent Labs   Lab 01/04/22  0829   WBC 12.13   HGB 12.0   HCT 36.6*        CMP:   Recent Labs   Lab 01/04/22  0829      K 3.7      CO2 25   GLU 78   BUN 12   CREATININE 0.6   CALCIUM 8.8   PROT 7.1   ALBUMIN 2.6*   BILITOT 0.4   ALKPHOS 346*   AST 47*   ALT 93*   ANIONGAP 13   EGFRNONAA >60       Significant Imaging:   Imaging Results          X-Ray Chest AP Portable (Final result)  Result time 12/27/21 09:27:27    Final result by Mateo Wren MD (12/27/21 09:27:27)                 Impression:      Scattered pulmonary infiltrates. Findings are not entirely specific but can be seen with multifocal airspace disease.      Electronically signed by: Mateo Wren MD  Date:    12/27/2021  Time:    09:27             Narrative:    EXAMINATION:  XR CHEST AP PORTABLE    CLINICAL HISTORY:  Shortness of breath    FINDINGS:  Single view of the chest.  04/24/2018    Cardiac silhouette is normal.  Scattered  pulmonary infiltrates. Findings are not entirely specific but can be seen with multifocal airspace disease.  No evidence of pleural effusion or pneumothorax.  Bones appear intact.

## 2022-01-04 NOTE — PLAN OF CARE
Pt AAOx4. NSR on the heart monitor. Remains on vapotherm 40 L 100% O2; tolerating well when at rest. O2 sat decreases with activity. Pt educated on importance of turn, cough, and deep breathe as well as sitting up and activity. Pt turned and repositioned independently. 18 G PIV in L AC 20 G PIV in L hand CDI with no redness, swelling, warmth, or drainage saline locked. ACCU checks initiated; blood glucose stable. Bed low, wheels locked, alarms audible. Plan of care reviewed. Vital signs monitored. Fall precautions in place. Pain assessed every two hours. Safety promoted. Infection risks reduced. Will continue to monitor.

## 2022-01-04 NOTE — ASSESSMENT & PLAN NOTE
Patient with Hypoxic Respiratory failure which is Acute.  she is not on home oxygen. Supplemental oxygen was provided and noted- Oxygen Concentration (%):  [] 95.   Signs/symptoms of respiratory failure include- tachypnea. Contributing diagnoses includes - asthma Labs and images were reviewed. Patient Has not had a recent ABG. Will treat underlying causes and adjust management of respiratory failure   Hypoxia secondary to pneumonia  Suspected to be viral in nature - COVID + on 12/29/21  Normal WBC  Procalcitonin and BNP are normal  Sputum culture  Supplemental oxygen to maintain sats > 92 %- Vapotherm in place- will wean as tolerated   Incentive spirometer  CTA of Chest - Large volume solid and subsolid pulmonary opacities   -Pulmonology following-

## 2022-01-04 NOTE — PROGRESS NOTES
[facility] Medicine  Progress Note    Patient Name: Yanira Medina  MRN: [id number]  Patient Class: IP- Inpatient   Admission Date: 12/27/2021  Length of Stay: 7 days  Attending Physician: Joss Gonzalez MD  Primary Care Provider: Juvenal White MD        Subjective:     Principal Problem:Pneumonia due to COVID-19 virus        HPI:  Yanira Medina is a 46 y.o. female who presents with 2-3 days of gradual onset, worsening, severe SOB with associated chest tightness and cough at home.  Multiple family members sick with viral/URI symptoms and one tested positive for COVID-19. Pt explains approx 3 days ago she developed sinus symptoms, severe headache, chills, sweats and had 1 episode of severe vomiting. The symptoms commenced to cough and SOB. Pt denies any recorded fevers. She notes SOB with exertion but reports feeling better as her headache has resolved. In the ED, her pulse ox was 86 % with exercise. COVID rapid screen was negative. COVID marker labs are pending. CXR - scattered pulmonary infiltrates. CTA of Chest is pending for further clarification and to rule out pulmonary embolism. Currently, pt is requiring 3 to 4 liters of oxygen via nasal cannula. Pt denies admission to hospital for asthma exacerbation.   CBC notes a left shift at 90 %, Alk Phos 207, AST 50, ALT 45.  COVID screen was negative  As clarification, on 12/27/2021 patient should be admitted for hospital observation services under my care in collaboration with Indu Freeman MD    Admitting diagnoses include pneumonia and hypoxic respiratory failure.          Overview/Hospital Course:  Worsening hypoxic respiratory failure overnight. Now on 15 L HFNC. Mild tachypnea but no obvious distress or increased work of breathing. COVID PCR pending. COVID inflammatory markers are elevated. Procalcitonin and BNP negative. CTA of Chest negative for PE but large volue solid and subsolid pulmonary opacities concerning  for COVID. Pt with hx of asthma and alpha tripsyn 1. Initially she refused remdesivir infusion but after seen by Dr. Cannon - pt agreeable to receive remdesivir. Strongly suspect COVID pneumonia due to clinical picture and physical exam.   12/29 Pt changed her mind about remdesivir. Also, refused Lovenox therapeutic anticoagulation. Today + COVID screen. Overnight increased oxygen demand due to worsened hypoxia with exertion. Pt wearing both 15 L and venti mask with oxygen at 33 %. SpO2 92 %. Mild tachypnea but no increased work of breathing. CXR worsened. Pt highly motivated with cough, deep breath and use of IS. Explained rationale of remdesevir but pt refuses administration. This was explained by both Nabil Cannon and Xochitl. Decadron and albuterol continued.   12/30 - Pt states she found out that [facility] does not have beds. She is okay with staying here and continuing treatment. She verbalizes understanding of the high level of oxygen requirement. She had an uneventful night and denies any changes in her breathing. Her cough is productive of pale yellow sputum with small amount blood tinged. She is less anxious today - pleasant demeanor. Highly motivated in cough/deep breathing and use of incentive spirometer. Currently on 15 L nasal cannula with  SpO2 95  %. Decadron, albuterol and LMWH in progress. She has mild edema to right foot which patient says is chronic. She denies any leg pain. Slight elevation in liver enzymes from baseline AST 41 >> 89; ALT 38 >> 94.  CRP down; ferritin &  sed rate more elevated.   12/31- Patient OOB, dyspnea improving. HFNC weaned down to 13 L, patient tolerating well, SpO2 93%. CXR reviewed- mild improvement noted. Continue decadron, albuterol and LMWH. Slight elevation in liver enzymes from baseline AST 41 >> 89; ALT 38 >> 94. No acute findings on abdominal US.   1/1/22 - CXR from 12/31 shows very mild improvement of infiltrates. Today, she describes fatigue. Endorses a  productive cough. COVID inflammatory markers are improving. Slight elevation is liver enzymes. Was on 15 L oxygen and now on 13 L oxygen but uses a venti mask on top of the nasal cannula at times. Describes nasal congestion - saline and afrin prescribed.   1/2/22- pt sitting up in bed with Vapotherm in place and reports symptom improvement. Leukocytosis noted in the setting of steroid use. Zinc initiated. Vitamin D level pending.  Plan of care discussed with patient and daughter at bedside and understanding verbalized.   1/3/22- pt sitting in chair and reports compliance with incentive spirometry. Pt remains on Vapotherm with oxygen saturations from 91%-93%. Buspar nightly requested to assist with anxiety.  Pt encouraged to remain in prone position as tolerated with understanding verbalized.  Current plan of care continued as previously prescribed.   On 1/4/22, pt sitting to side of bed with Vapotherm in place and O2 sats 95%-91%.  Will wean as tolerated. Pt encouraged to prone with understanding verbalized.  Current plan of care continued as previously prescribed.       Interval History: pt sitting up to side of bed and states that she is feeling fine today.  Pt reports increased DYSPNEA with movement and activity. Vapotherm in place with O2 sats 95%-91%- will wean as tolerated. Current plan of care continued as prescribed.     Review of Systems   Constitutional: Positive for activity change (improved). Negative for chills and diaphoresis.   HENT: Negative for congestion.    Eyes: Negative.    Respiratory: Positive for cough, chest tightness (improved) and shortness of breath. Negative for wheezing.    Cardiovascular: Positive for leg swelling (trace at ankles ). Negative for chest pain and palpitations.   Gastrointestinal: Negative for abdominal pain, diarrhea, nausea and vomiting.   Genitourinary: Negative.    Musculoskeletal: Negative.    Skin: Negative.    Neurological: Negative for dizziness, weakness and  headaches.   Psychiatric/Behavioral: The patient is nervous/anxious (improved).      Objective:     Vital Signs (Most Recent):  Temp: 97.9 °F (36.6 °C) (01/04/22 1117)  Pulse: 82 (01/04/22 1256)  Resp: 16 (01/04/22 1256)  BP: (!) 99/50 (01/04/22 1117)  SpO2: (!) 93 % (01/04/22 1256) Vital Signs (24h Range):  Temp:  [97.7 °F (36.5 °C)-100.1 °F (37.8 °C)] 97.9 °F (36.6 °C)  Pulse:  [54-82] 82  Resp:  [16-20] 16  SpO2:  [88 %-99 %] 93 %  BP: ()/(50-72) 99/50     Weight: 90.4 kg (199 lb 4.7 oz)  Body mass index is 31.21 kg/m².    Intake/Output Summary (Last 24 hours) at 1/4/2022 1430  Last data filed at 1/3/2022 1800  Gross per 24 hour   Intake 350 ml   Output --   Net 350 ml      Physical Exam  Vitals and nursing note reviewed.   Constitutional:       Appearance: She is well-developed. She is ill-appearing.   HENT:      Head: Normocephalic and atraumatic.      Nose: Nose normal.   Eyes:      Conjunctiva/sclera: Conjunctivae normal.   Cardiovascular:      Rate and Rhythm: Normal rate and regular rhythm.      Heart sounds: Normal heart sounds. No murmur heard.  No friction rub. No gallop.    Pulmonary:      Effort: Tachypnea present. No accessory muscle usage or respiratory distress.      Breath sounds: Examination of the right-lower field reveals decreased breath sounds. Examination of the left-lower field reveals decreased breath sounds. Decreased breath sounds present. No rales.      Comments: Left sided breath sound more decreased than right side  Abdominal:      General: Bowel sounds are normal. There is no distension.      Palpations: Abdomen is soft.      Tenderness: There is no abdominal tenderness.   Genitourinary:     Comments: Deferred   Musculoskeletal:         General: No swelling or tenderness. Normal range of motion.      Cervical back: Normal range of motion and neck supple.   Skin:     General: Skin is warm and dry.   Neurological:      Mental Status: She is alert and oriented to person, place, and  time.   Psychiatric:         Behavior: Behavior normal.         Significant Labs:   All pertinent labs within the past 24 hours have been reviewed.  CBC:   Recent Labs   Lab 01/04/22  0829   WBC 12.13   HGB 12.0   HCT 36.6*        CMP:   Recent Labs   Lab 01/04/22  0829      K 3.7      CO2 25   GLU 78   BUN 12   CREATININE 0.6   CALCIUM 8.8   PROT 7.1   ALBUMIN 2.6*   BILITOT 0.4   ALKPHOS 346*   AST 47*   ALT 93*   ANIONGAP 13   EGFRNONAA >60       Significant Imaging:   Imaging Results          X-Ray Chest AP Portable (Final result)  Result time 12/27/21 09:27:27    Final result by Mateo Wren MD (12/27/21 09:27:27)                 Impression:      Scattered pulmonary infiltrates. Findings are not entirely specific but can be seen with multifocal airspace disease.      Electronically signed by: Mateo Wren MD  Date:    12/27/2021  Time:    09:27             Narrative:    EXAMINATION:  XR CHEST AP PORTABLE    CLINICAL HISTORY:  Shortness of breath    FINDINGS:  Single view of the chest.  04/24/2018    Cardiac silhouette is normal.  Scattered pulmonary infiltrates. Findings are not entirely specific but can be seen with multifocal airspace disease.  No evidence of pleural effusion or pneumothorax.  Bones appear intact.                                Assessment/Plan:      * Pneumonia due to COVID-19 virus  + COVID PCR  Normal WBC  Procalcitonin and BNP normal  Sputum culture pending  Supplemental oxygen to maintain sats > 92 %  Incentive spirometer  COVID inflammatory markers elevated  CTA of Chest Large volume solid and subsolid pulmonary opacities   Albuterol inhaler  CXR - worsening of the mixed interstitial and alveolar opacities throughout the periphery of the lungs  12/31 repeat CXR shows mild improvement   -1/2/22- Vapotherm in place- will wean as tolerated   -1/3/22- Vapotherm continued- prone position encouraged- Pulmonology following   -1/4/22- Vapotherm continued- prone  positioning encouraged as tolerated- Pulm following        Abnormal LFTs  -in the setting of COVID   Slowly trending down  No acute findings noted on liver US.       Abnormality of albumin  -in the setting of viral syndrome   Dietitian advised increased protein intake  -compliance with recommendation encouraged       Mild intermittent asthma without complication  -Albuterol      Alpha-1-antitrypsin deficiency carrier  MZ phenotype  -outpatient follow up       Acute hypoxemic respiratory failure  Patient with Hypoxic Respiratory failure which is Acute.  she is not on home oxygen. Supplemental oxygen was provided and noted- Oxygen Concentration (%):  [] 95.   Signs/symptoms of respiratory failure include- tachypnea. Contributing diagnoses includes - asthma Labs and images were reviewed. Patient Has not had a recent ABG. Will treat underlying causes and adjust management of respiratory failure   Hypoxia secondary to pneumonia  Suspected to be viral in nature - COVID + on 12/29/21  Normal WBC  Procalcitonin and BNP are normal  Sputum culture  Supplemental oxygen to maintain sats > 92 %- Vapotherm in place- will wean as tolerated   Incentive spirometer  CTA of Chest - Large volume solid and subsolid pulmonary opacities   -Pulmonology following-     Adult hypothyroidism  Continue synthroid        VTE Risk Mitigation (From admission, onward)         Ordered     enoxaparin injection 90 mg  2 times daily         12/28/21 0825     IP VTE HIGH RISK PATIENT  Once         12/27/21 1759     Place sequential compression device  Until discontinued         12/27/21 1759                Discharge Planning   SANDRA:      Code Status: Full Code   Is the patient medically ready for discharge?:     Reason for patient still in hospital (select all that apply): Patient trending condition, Treatment and Consult recommendations  Discharge Plan A: Home with family                  Sangeeta Canseco NP  Department of Hospital Medicine   [facility] -  Telemetry ([facility])

## 2022-01-04 NOTE — ASSESSMENT & PLAN NOTE
+ COVID PCR  Normal WBC  Procalcitonin and BNP normal  Sputum culture pending  Supplemental oxygen to maintain sats > 92 %  Incentive spirometer  COVID inflammatory markers elevated  CTA of Chest Large volume solid and subsolid pulmonary opacities   Albuterol inhaler  CXR - worsening of the mixed interstitial and alveolar opacities throughout the periphery of the lungs  12/31 repeat CXR shows mild improvement   -1/2/22- Vapotherm in place- will wean as tolerated   -1/3/22- Vapotherm continued- prone position encouraged- Pulmonology following   -1/4/22- Vapotherm continued- prone positioning encouraged as tolerated- Pulm following

## 2022-01-05 LAB
CRP SERPL-MCNC: 91.29 MG/L (ref 0–3.19)
POCT GLUCOSE: 112 MG/DL (ref 70–110)
POCT GLUCOSE: 91 MG/DL (ref 70–110)

## 2022-01-05 PROCEDURE — 25000003 PHARM REV CODE 250: Performed by: NURSE PRACTITIONER

## 2022-01-05 PROCEDURE — 99900035 HC TECH TIME PER 15 MIN (STAT)

## 2022-01-05 PROCEDURE — 21400001 HC TELEMETRY ROOM

## 2022-01-05 PROCEDURE — 27100171 HC OXYGEN HIGH FLOW UP TO 24 HOURS

## 2022-01-05 PROCEDURE — 94799 UNLISTED PULMONARY SVC/PX: CPT

## 2022-01-05 PROCEDURE — 94640 AIRWAY INHALATION TREATMENT: CPT

## 2022-01-05 PROCEDURE — 94760 N-INVAS EAR/PLS OXIMETRY 1: CPT

## 2022-01-05 PROCEDURE — 27000207 HC ISOLATION

## 2022-01-05 PROCEDURE — 94761 N-INVAS EAR/PLS OXIMETRY MLT: CPT

## 2022-01-05 PROCEDURE — 63600175 PHARM REV CODE 636 W HCPCS: Performed by: NURSE PRACTITIONER

## 2022-01-05 RX ADMIN — THERA TABS 1 TABLET: TAB at 09:01

## 2022-01-05 RX ADMIN — FAMOTIDINE 20 MG: 20 TABLET ORAL at 08:01

## 2022-01-05 RX ADMIN — Medication 1000 UNITS: at 09:01

## 2022-01-05 RX ADMIN — ALBUTEROL SULFATE 2 PUFF: 90 AEROSOL, METERED RESPIRATORY (INHALATION) at 01:01

## 2022-01-05 RX ADMIN — ACETAMINOPHEN 650 MG: 325 TABLET ORAL at 05:01

## 2022-01-05 RX ADMIN — ENOXAPARIN SODIUM 90 MG: 100 INJECTION SUBCUTANEOUS at 09:01

## 2022-01-05 RX ADMIN — GUAIFENESIN 600 MG: 600 TABLET, EXTENDED RELEASE ORAL at 09:01

## 2022-01-05 RX ADMIN — GUAIFENESIN 600 MG: 600 TABLET, EXTENDED RELEASE ORAL at 08:01

## 2022-01-05 RX ADMIN — OXYCODONE HYDROCHLORIDE AND ACETAMINOPHEN 500 MG: 500 TABLET ORAL at 08:01

## 2022-01-05 RX ADMIN — FAMOTIDINE 20 MG: 20 TABLET ORAL at 09:01

## 2022-01-05 RX ADMIN — ALBUTEROL SULFATE 2 PUFF: 90 AEROSOL, METERED RESPIRATORY (INHALATION) at 08:01

## 2022-01-05 RX ADMIN — ACETAMINOPHEN 650 MG: 325 TABLET ORAL at 08:01

## 2022-01-05 RX ADMIN — ENOXAPARIN SODIUM 90 MG: 100 INJECTION SUBCUTANEOUS at 08:01

## 2022-01-05 RX ADMIN — DEXAMETHASONE 6 MG: 4 TABLET ORAL at 09:01

## 2022-01-05 RX ADMIN — ZINC SULFATE 220 MG (50 MG) CAPSULE 220 MG: CAPSULE at 09:01

## 2022-01-05 RX ADMIN — OXYCODONE HYDROCHLORIDE AND ACETAMINOPHEN 500 MG: 500 TABLET ORAL at 09:01

## 2022-01-05 RX ADMIN — BUSPIRONE HYDROCHLORIDE 5 MG: 5 TABLET ORAL at 08:01

## 2022-01-05 NOTE — PLAN OF CARE
Pt remains AAOx4. NSR on the heart monitor. Vapotherm titrated to 35 L 95% O2; tolerating well when at rest. O2 sat continues to decrease with activity. Pt education reinforced on importance of turn, cough, and deep breathe as well as sitting up and activity. Remdesevir education provided; pt continues to refuse. Pt turned and repositioned independently. 18 G PIV in L AC 20 G PIV in L hand CDI with no redness, swelling, warmth, or drainage saline locked. Blood glucose remains stable. Bed low, wheels locked, alarms audible. Plan of care reviewed. Vital signs monitored. Fall precautions in place. Pain assessed every two hours. Safety promoted. Infection risks reduced. Will continue to monitor.

## 2022-01-05 NOTE — PLAN OF CARE
CM received phone call from patient requesting to have referrals sent to [facility] for [facility]AC placement.

## 2022-01-05 NOTE — PLAN OF CARE
CM spoke with patient and daughter regarding d/c planning and anticipated need for LTAC upon d/c. Patient/family agreeable and will review [facility] LTAC's and contact CM with choice.

## 2022-01-05 NOTE — PLAN OF CARE
Pt oriented x4.  VSS.  Pt remained afebrile throughout this shift.   All meds administered per order.   Pt remained free of falls this shift.   Vapotherm 30/80  Plan of care reviewed. Patient verbalizes understanding.   Pt moving/turning independently. Self proning and independently up to chair.  Patient SB-SR on monitor.   Bed low, side rails up x 2, wheels locked, call light in reach.   Patient instructed to call for assistance.   Hourly rounding completed

## 2022-01-05 NOTE — PROGRESS NOTES
"[facility] - Telemetry ([facility])  Adult Nutrition  Progress Note    SUMMARY       Recommendations    Recommendation/Intervention:   1. Recommend to continue patient on regular diet.   2. RD signing off. Please consult if any nutrition problems arise.    Goals:   1. Patient will continue to eat 100% PO intake.    Nutrition Goal Status: goal met    Assessment and Plan    No pes warranted at this time.      Malnutrition Assessment                                       Reason for Assessment    Reason For Assessment: length of stay  Diagnosis: infection/sepsis (Pneumonia COVID 19)  Relevant Medical History: thyroid disease, asthma  General Information Comments: Patient seen for LOS. Patient is on a regular diet and is eating 100% of meals. Patient loves the food and has no complaints. Patient has some trace edema. Patients weight is stable. Patients last BM 1/4.  Nutrition Discharge Planning: Regular Diet    Nutrition Risk Screen    Nutrition Risk Screen: no indicators present    Nutrition/Diet History    Patient Reported Diet/Restrictions/Preferences: general  Spiritual, Cultural Beliefs, Buddhist Practices, Values that Affect Care: no  Food Allergies: NKFA  Factors Affecting Nutritional Intake: None identified at this time    Anthropometrics    Temp: 98.6 °F (37 °C)  Height Method: Stated  Height: 5' 7" (170.2 cm)  Height (inches): 67 in  Weight Method: Bed Scale  Weight: 90.4 kg (199 lb 4.7 oz)  Weight (lb): 199.3 lb  Ideal Body Weight (IBW), Female: 135 lb  BMI (Calculated): 31.2       Lab/Procedures/Meds  BMP  Lab Results   Component Value Date     01/04/2022    K 3.7 01/04/2022     01/04/2022    CO2 25 01/04/2022    BUN 12 01/04/2022    CREATININE 0.6 01/04/2022    CALCIUM 8.8 01/04/2022    ANIONGAP 13 01/04/2022    ESTGFRAFRICA >60 01/04/2022    EGFRNONAA >60 01/04/2022     Lab Results   Component Value Date     01/04/2022    K 3.7 01/04/2022     01/04/2022    CO2 25 01/04/2022     Lab " Results   Component Value Date    CALCIUM 8.8 01/04/2022     Lab Results   Component Value Date    ALBUMIN 2.6 (L) 01/04/2022     Pertinent Labs Reviewed: reviewed  Pertinent Medications Reviewed: reviewed  Scheduled Meds:   albuterol  2 puff Inhalation Q6H    ascorbic acid (vitamin C)  500 mg Oral BID    busPIRone  5 mg Oral QHS    dexAMETHasone  6 mg Oral Daily    enoxaparin  1 mg/kg Subcutaneous BID    famotidine  20 mg Oral BID    guaiFENesin  600 mg Oral BID    levothyroxine  125 mcg Oral Daily    multivitamin  1 tablet Oral Daily    vitamin D  1,000 Units Oral Daily    zinc sulfate  220 mg Oral Daily     Continuous Infusions:  PRN Meds:.acetaminophen, acetaminophen, aluminum-magnesium hydroxide-simethicone, dextrose 50%, dextrose 50%, glucagon (human recombinant), glucose, glucose, magnesium oxide, magnesium oxide, melatonin, naloxone, ondansetron, prochlorperazine, simethicone, sodium chloride, sodium chloride 0.9%, sodium chloride 0.9%    Physical Findings/Assessment         Estimated/Assessed Needs    Weight Used For Calorie Calculations: 90.4 kg (199 lb 4.7 oz)  Energy Calorie Requirements (kcal): 1576 (mifflin  no AF)  Energy Need Method: [facility]  Protein Requirements: 72 (0.8g/kg, maintenance)  Weight Used For Protein Calculations: 90.4 kg (199 lb 4.7 oz)  Fluid Requirements (mL): 1576  Estimated Fluid Requirement Method: RDA Method  RDA Method (mL): 1576  CHO Requirement: 197      Nutrition Prescription Ordered    Current Diet Order: Regular Diet    Evaluation of Received Nutrient/Fluid Intake    % Kcal Needs: 100%  % Protein Needs: 100%  Energy Calories Required: meeting needs  Protein Required: meeting needs  Fluid Required: meeting needs  Tolerance: tolerating  % Intake of Estimated Energy Needs: 75 - 100 %  % Meal Intake: 75 - 100 %    Nutrition Risk          Monitor and Evaluation    Food and Nutrient Intake: energy intake,food and beverage intake  Food and Nutrient  Adminstration: diet order  Knowledge/Beliefs/Attitudes: food and nutrition knowledge/skill,beliefs and attitudes  Physical Activity and Function: nutrition-related ADLs and IADLs  Anthropometric Measurements: weight,weight change,body mass index  Biochemical Data, Medical Tests and Procedures: electrolyte and renal panel,gastrointestinal profile,glucose/endocrine profile,inflammatory profile,lipid profile  Nutrition-Focused Physical Findings: overall appearance     Nutrition Follow-Up    RD Follow-up?: No  Katerina Erazo RD,JUSTINON

## 2022-01-06 LAB
ERYTHROCYTE [SEDIMENTATION RATE] IN BLOOD BY WESTERGREN METHOD: 99 MM/HR (ref 0–20)
FERRITIN SERPL-MCNC: 833 NG/ML (ref 20–300)
LDH SERPL L TO P-CCNC: 505 U/L (ref 110–260)
POCT GLUCOSE: 119 MG/DL (ref 70–110)
POCT GLUCOSE: 179 MG/DL (ref 70–110)
POCT GLUCOSE: 77 MG/DL (ref 70–110)

## 2022-01-06 PROCEDURE — 94640 AIRWAY INHALATION TREATMENT: CPT

## 2022-01-06 PROCEDURE — 27100171 HC OXYGEN HIGH FLOW UP TO 24 HOURS

## 2022-01-06 PROCEDURE — 25000003 PHARM REV CODE 250: Performed by: NURSE PRACTITIONER

## 2022-01-06 PROCEDURE — 27000207 HC ISOLATION

## 2022-01-06 PROCEDURE — 94799 UNLISTED PULMONARY SVC/PX: CPT

## 2022-01-06 PROCEDURE — 36415 COLL VENOUS BLD VENIPUNCTURE: CPT | Performed by: NURSE PRACTITIONER

## 2022-01-06 PROCEDURE — 63600175 PHARM REV CODE 636 W HCPCS: Performed by: NURSE PRACTITIONER

## 2022-01-06 PROCEDURE — 86141 C-REACTIVE PROTEIN HS: CPT | Performed by: NURSE PRACTITIONER

## 2022-01-06 PROCEDURE — 99232 PR SUBSEQUENT HOSPITAL CARE,LEVL II: ICD-10-PCS | Mod: ,,, | Performed by: PHYSICIAN ASSISTANT

## 2022-01-06 PROCEDURE — 21400001 HC TELEMETRY ROOM

## 2022-01-06 PROCEDURE — 85651 RBC SED RATE NONAUTOMATED: CPT | Performed by: NURSE PRACTITIONER

## 2022-01-06 PROCEDURE — 82728 ASSAY OF FERRITIN: CPT | Performed by: NURSE PRACTITIONER

## 2022-01-06 PROCEDURE — 94760 N-INVAS EAR/PLS OXIMETRY 1: CPT

## 2022-01-06 PROCEDURE — 99232 SBSQ HOSP IP/OBS MODERATE 35: CPT | Mod: ,,, | Performed by: PHYSICIAN ASSISTANT

## 2022-01-06 PROCEDURE — 25000242 PHARM REV CODE 250 ALT 637 W/ HCPCS: Performed by: NURSE PRACTITIONER

## 2022-01-06 PROCEDURE — 83615 LACTATE (LD) (LDH) ENZYME: CPT | Performed by: NURSE PRACTITIONER

## 2022-01-06 PROCEDURE — 99900035 HC TECH TIME PER 15 MIN (STAT)

## 2022-01-06 RX ORDER — CETIRIZINE HYDROCHLORIDE 10 MG/1
10 TABLET ORAL DAILY
Status: DISCONTINUED | OUTPATIENT
Start: 2022-01-06 | End: 2022-01-08 | Stop reason: HOSPADM

## 2022-01-06 RX ORDER — FLUTICASONE PROPIONATE 50 MCG
2 SPRAY, SUSPENSION (ML) NASAL DAILY
Status: DISCONTINUED | OUTPATIENT
Start: 2022-01-06 | End: 2022-01-08 | Stop reason: HOSPADM

## 2022-01-06 RX ADMIN — GUAIFENESIN 600 MG: 600 TABLET, EXTENDED RELEASE ORAL at 09:01

## 2022-01-06 RX ADMIN — Medication 1000 UNITS: at 09:01

## 2022-01-06 RX ADMIN — ACETAMINOPHEN 650 MG: 325 TABLET ORAL at 09:01

## 2022-01-06 RX ADMIN — FAMOTIDINE 20 MG: 20 TABLET ORAL at 09:01

## 2022-01-06 RX ADMIN — FLUTICASONE PROPIONATE 100 MCG: 50 SPRAY, METERED NASAL at 02:01

## 2022-01-06 RX ADMIN — ALBUTEROL SULFATE 2 PUFF: 90 AEROSOL, METERED RESPIRATORY (INHALATION) at 07:01

## 2022-01-06 RX ADMIN — ALBUTEROL SULFATE 2 PUFF: 90 AEROSOL, METERED RESPIRATORY (INHALATION) at 12:01

## 2022-01-06 RX ADMIN — CETIRIZINE HYDROCHLORIDE 10 MG: 10 TABLET, FILM COATED ORAL at 02:01

## 2022-01-06 RX ADMIN — DEXAMETHASONE 6 MG: 4 TABLET ORAL at 09:01

## 2022-01-06 RX ADMIN — ALBUTEROL SULFATE 2 PUFF: 90 AEROSOL, METERED RESPIRATORY (INHALATION) at 08:01

## 2022-01-06 RX ADMIN — ALBUTEROL SULFATE 2 PUFF: 90 AEROSOL, METERED RESPIRATORY (INHALATION) at 01:01

## 2022-01-06 RX ADMIN — OXYCODONE HYDROCHLORIDE AND ACETAMINOPHEN 500 MG: 500 TABLET ORAL at 09:01

## 2022-01-06 RX ADMIN — THERA TABS 1 TABLET: TAB at 09:01

## 2022-01-06 RX ADMIN — ENOXAPARIN SODIUM 90 MG: 100 INJECTION SUBCUTANEOUS at 09:01

## 2022-01-06 RX ADMIN — ZINC SULFATE 220 MG (50 MG) CAPSULE 220 MG: CAPSULE at 09:01

## 2022-01-06 RX ADMIN — ACETAMINOPHEN 650 MG: 325 TABLET ORAL at 04:01

## 2022-01-06 RX ADMIN — BUSPIRONE HYDROCHLORIDE 5 MG: 5 TABLET ORAL at 09:01

## 2022-01-06 NOTE — PROGRESS NOTES
[facility]  Progress Note    Patient Name: Yanira Medina  MRN: [id number]  Patient Class: IP- Inpatient   Admission Date: 12/27/2021  Length of Stay: 8 days  Attending Physician: Joss Gonzalez MD  Primary Care Provider: Juvenal White MD        Subjective:     Principal Problem:Pneumonia due to COVID-19 virus        HPI:  Yanira Medina is a 46 y.o. female who presents with 2-3 days of gradual onset, worsening, severe SOB with associated chest tightness and cough at home.  Multiple family members sick with viral/URI symptoms and one tested positive for COVID-19. Pt explains approx 3 days ago she developed sinus symptoms, severe headache, chills, sweats and had 1 episode of severe vomiting. The symptoms commenced to cough and SOB. Pt denies any recorded fevers. She notes SOB with exertion but reports feeling better as her headache has resolved. In the ED, her pulse ox was 86 % with exercise. COVID rapid screen was negative. COVID marker labs are pending. CXR - scattered pulmonary infiltrates. CTA of Chest is pending for further clarification and to rule out pulmonary embolism. Currently, pt is requiring 3 to 4 liters of oxygen via nasal cannula. Pt denies admission to hospital for asthma exacerbation.   CBC notes a left shift at 90 %, Alk Phos 207, AST 50, ALT 45.  COVID screen was negative  As clarification, on 12/27/2021 patient should be admitted for hospital observation services under my care in collaboration with Indu Freeman MD    Admitting diagnoses include pneumonia and hypoxic respiratory failure.          Overview/Hospital Course:  Worsening hypoxic respiratory failure overnight. Now on 15 L HFNC. Mild tachypnea but no obvious distress or increased work of breathing. COVID PCR pending. COVID inflammatory markers are elevated. Procalcitonin and BNP negative. CTA of Chest negative for PE but large volue solid and subsolid pulmonary opacities concerning  for COVID. Pt with hx of asthma and alpha tripsyn 1. Initially she refused remdesivir infusion but after seen by Dr. Cannon - pt agreeable to receive remdesivir. Strongly suspect COVID pneumonia due to clinical picture and physical exam.   12/29 Pt changed her mind about remdesivir. Also, refused Lovenox therapeutic anticoagulation. Today + COVID screen. Overnight increased oxygen demand due to worsened hypoxia with exertion. Pt wearing both 15 L and venti mask with oxygen at 33 %. SpO2 92 %. Mild tachypnea but no increased work of breathing. CXR worsened. Pt highly motivated with cough, deep breath and use of IS. Explained rationale of remdesevir but pt refuses administration. This was explained by both Nabil Cannon and Xochitl. Decadron and albuterol continued.   12/30 - Pt states she found out that [facility] does not have beds. She is okay with staying here and continuing treatment. She verbalizes understanding of the high level of oxygen requirement. She had an uneventful night and denies any changes in her breathing. Her cough is productive of pale yellow sputum with small amount blood tinged. She is less anxious today - pleasant demeanor. Highly motivated in cough/deep breathing and use of incentive spirometer. Currently on 15 L nasal cannula with  SpO2 95  %. Decadron, albuterol and LMWH in progress. She has mild edema to right foot which patient says is chronic. She denies any leg pain. Slight elevation in liver enzymes from baseline AST 41 >> 89; ALT 38 >> 94.  CRP down; ferritin &  sed rate more elevated.   12/31- Patient OOB, dyspnea improving. HFNC weaned down to 13 L, patient tolerating well, SpO2 93%. CXR reviewed- mild improvement noted. Continue decadron, albuterol and LMWH. Slight elevation in liver enzymes from baseline AST 41 >> 89; ALT 38 >> 94. No acute findings on abdominal US.   1/1/22 - CXR from 12/31 shows very mild improvement of infiltrates. Today, she describes fatigue. Endorses a  productive cough. COVID inflammatory markers are improving. Slight elevation is liver enzymes. Was on 15 L oxygen and now on 13 L oxygen but uses a venti mask on top of the nasal cannula at times. Describes nasal congestion - saline and afrin prescribed.   1/2/22- pt sitting up in bed with Vapotherm in place and reports symptom improvement. Leukocytosis noted in the setting of steroid use. Zinc initiated. Vitamin D level pending.  Plan of care discussed with patient and daughter at bedside and understanding verbalized.   1/3/22- pt sitting in chair and reports compliance with incentive spirometry. Pt remains on Vapotherm with oxygen saturations from 91%-93%. Buspar nightly requested to assist with anxiety.  Pt encouraged to remain in prone position as tolerated with understanding verbalized.  Current plan of care continued as previously prescribed.   On 1/4/22, pt sitting to side of bed with Vapotherm in place and O2 sats 95%-91%.  Will wean as tolerated. Pt encouraged to prone with understanding verbalized.  Current plan of care continued as previously prescribed.   On 1/5/22- pt sitting up in chair and continues to tolerate weaning of Vapotherm.  Social work consulted to assist with discharge planning and LTAC placement and referrals placed.  Pt remains stable and verbalized symptom improvement with no signs of acute distress.  Current plan of care continued as previously prescribed.       Interval History: pt sitting up in chair and continues to tolerate weaning of Vapotherm.  Social work consulted to assist with discharge planning and LTAC placement and referrals placed.  Pt remains stable and verbalized symptom improvement with no signs of acute distress.  Current plan of care continued as previously prescribed.     Review of Systems   Constitutional: Positive for activity change (improved). Negative for chills and diaphoresis.   HENT: Negative for congestion.    Eyes: Negative.    Respiratory: Positive for  cough, chest tightness (improved) and shortness of breath. Negative for wheezing.    Cardiovascular: Positive for leg swelling (trace at ankles ). Negative for chest pain and palpitations.   Gastrointestinal: Negative for abdominal pain, diarrhea, nausea and vomiting.   Genitourinary: Negative.    Musculoskeletal: Negative.    Skin: Negative.    Neurological: Negative for dizziness, weakness and headaches.   Psychiatric/Behavioral: The patient is nervous/anxious (improved).      Objective:     Vital Signs (Most Recent):  Temp: 98 °F (36.7 °C) (01/05/22 1533)  Pulse: 73 (01/05/22 1533)  Resp: (!) 22 (01/05/22 1533)  BP: (!) 125/58 (01/05/22 1533)  SpO2: (!) 93 % (01/05/22 1533) Vital Signs (24h Range):  Temp:  [97.8 °F (36.6 °C)-98.6 °F (37 °C)] 98 °F (36.7 °C)  Pulse:  [58-89] 73  Resp:  [17-22] 22  SpO2:  [90 %-100 %] 93 %  BP: (105-126)/(57-72) 125/58     Weight: 90.4 kg (199 lb 4.7 oz)  Body mass index is 31.21 kg/m².    Intake/Output Summary (Last 24 hours) at 1/5/2022 1804  Last data filed at 1/5/2022 0500  Gross per 24 hour   Intake --   Output 800 ml   Net -800 ml      Physical Exam  Vitals and nursing note reviewed.   Constitutional:       Appearance: She is well-developed. She is ill-appearing.   HENT:      Head: Normocephalic and atraumatic.      Nose: Nose normal.   Eyes:      Conjunctiva/sclera: Conjunctivae normal.   Cardiovascular:      Rate and Rhythm: Normal rate and regular rhythm.      Heart sounds: Normal heart sounds. No murmur heard.  No friction rub. No gallop.    Pulmonary:      Effort: No tachypnea, accessory muscle usage or respiratory distress.      Breath sounds: No decreased breath sounds or rales.      Comments: Left sided breath sound more decreased than right side  Abdominal:      General: Bowel sounds are normal. There is no distension.      Palpations: Abdomen is soft.      Tenderness: There is no abdominal tenderness.   Genitourinary:     Comments: Deferred   Musculoskeletal:          General: No swelling or tenderness. Normal range of motion.      Cervical back: Normal range of motion and neck supple.   Skin:     General: Skin is warm and dry.   Neurological:      Mental Status: She is alert and oriented to person, place, and time.   Psychiatric:         Behavior: Behavior normal.         Significant Labs:   All pertinent labs within the past 24 hours have been reviewed.  CBC:   Recent Labs   Lab 01/04/22  0829   WBC 12.13   HGB 12.0   HCT 36.6*        CMP:   Recent Labs   Lab 01/04/22  0829      K 3.7      CO2 25   GLU 78   BUN 12   CREATININE 0.6   CALCIUM 8.8   PROT 7.1   ALBUMIN 2.6*   BILITOT 0.4   ALKPHOS 346*   AST 47*   ALT 93*   ANIONGAP 13   EGFRNONAA >60       Significant Imaging:   Imaging Results          X-Ray Chest AP Portable (Final result)  Result time 12/27/21 09:27:27    Final result by Mateo Wren MD (12/27/21 09:27:27)                 Impression:      Scattered pulmonary infiltrates. Findings are not entirely specific but can be seen with multifocal airspace disease.      Electronically signed by: Mateo Wren MD  Date:    12/27/2021  Time:    09:27             Narrative:    EXAMINATION:  XR CHEST AP PORTABLE    CLINICAL HISTORY:  Shortness of breath    FINDINGS:  Single view of the chest.  04/24/2018    Cardiac silhouette is normal.  Scattered pulmonary infiltrates. Findings are not entirely specific but can be seen with multifocal airspace disease.  No evidence of pleural effusion or pneumothorax.  Bones appear intact.                                Assessment/Plan:      * Pneumonia due to COVID-19 virus  + COVID PCR  Normal WBC  Procalcitonin and BNP normal  Sputum culture pending  Supplemental oxygen to maintain sats > 92 %  Incentive spirometer  COVID inflammatory markers elevated  CTA of Chest Large volume solid and subsolid pulmonary opacities   Albuterol inhaler  CXR - worsening of the mixed interstitial and alveolar opacities throughout the  periphery of the lungs  12/31 repeat CXR shows mild improvement   -1/2/22- Vapotherm in place- will wean as tolerated   -1/3/22- Vapotherm continued- prone position encouraged- Pulmonology following   -1/4/22- Vapotherm continued- prone positioning encouraged as tolerated- Pulm following  -1/5/22- Vapotherm in place with weaning tolerated- Social work consulted for LTAC placement         Abnormal LFTs  -in the setting of COVID   Slowly trending down  No acute findings noted on liver US.       Abnormality of albumin  -in the setting of viral syndrome   Dietitian advised increased protein intake  -compliance with recommendation encouraged       Mild intermittent asthma without complication  -Albuterol      Alpha-1-antitrypsin deficiency carrier  MZ phenotype  -outpatient follow up       Acute hypoxemic respiratory failure  Patient with Hypoxic Respiratory failure which is Acute.  she is not on home oxygen. Supplemental oxygen was provided and noted- Oxygen Concentration (%):  [75-80] 75.   Signs/symptoms of respiratory failure include- tachypnea. Contributing diagnoses includes - asthma Labs and images were reviewed. Patient Has not had a recent ABG. Will treat underlying causes and adjust management of respiratory failure   Hypoxia secondary to pneumonia  Suspected to be viral in nature - COVID + on 12/29/21  Normal WBC  Procalcitonin and BNP are normal  Sputum culture  Supplemental oxygen to maintain sats > 92 %- Vapotherm in place- will wean as tolerated   Incentive spirometer  CTA of Chest - Large volume solid and subsolid pulmonary opacities   -Pulmonology following-     Adult hypothyroidism  Continue synthroid        VTE Risk Mitigation (From admission, onward)         Ordered     enoxaparin injection 90 mg  2 times daily         12/28/21 0825     IP VTE HIGH RISK PATIENT  Once         12/27/21 1759     Place sequential compression device  Until discontinued         12/27/21 1759                Discharge  Planning   SANDRA:      Code Status: Full Code   Is the patient medically ready for discharge?:     Reason for patient still in hospital (select all that apply): Patient trending condition, Laboratory test and Treatment  Discharge Plan A: Home with family                  Sangeeta Canseco NP  Department of Hospital Medicine   [facility] - Telemetry ([facility])

## 2022-01-06 NOTE — SUBJECTIVE & OBJECTIVE
Interval History: 01/06/2022: seen and examined, improving, lung sounds with more movement, Tmax 98.1F, O2 HFNC 25LPM, waiting for authorization for [facility] [place]    Review of Systems   Constitutional: Negative for chills and malaise/fatigue.   Respiratory: Positive for cough and shortness of breath. Negative for hemoptysis and sputum production.    Cardiovascular: Negative for chest pain and leg swelling.   Gastrointestinal: Negative for constipation.   Skin: Negative for rash.   All other systems reviewed and are negative.      Objective:     Vital Signs (Most Recent):  Temp: 98.1 °F (36.7 °C) (01/06/22 1206)  Pulse: 84 (01/06/22 1206)  Resp: 18 (01/06/22 1206)  BP: (!) 103/51 (01/06/22 1206)  SpO2: (!) 94 % (01/06/22 1206) Vital Signs (24h Range):  Temp:  [97.4 °F (36.3 °C)-98.1 °F (36.7 °C)] 98.1 °F (36.7 °C)  Pulse:  [63-85] 84  Resp:  [18-22] 18  SpO2:  [89 %-99 %] 94 %  BP: (103-137)/(51-97) 103/51     Weight: 90.4 kg (199 lb 4.7 oz)  Body mass index is 31.21 kg/m².      Intake/Output Summary (Last 24 hours) at 1/6/2022 1343  Last data filed at 1/6/2022 0553  Gross per 24 hour   Intake 1200 ml   Output --   Net 1200 ml       Physical Exam  Vitals reviewed.   Constitutional:       Appearance: She is not toxic-appearing.   HENT:      Mouth/Throat:      Mouth: Mucous membranes are moist.   Eyes:      Extraocular Movements: Extraocular movements intact.      Pupils: Pupils are equal, round, and reactive to light.   Cardiovascular:      Rate and Rhythm: Normal rate and regular rhythm.   Pulmonary:      Effort: Pulmonary effort is normal. No respiratory distress.      Breath sounds: No rhonchi or rales.   Musculoskeletal:      Cervical back: Normal range of motion and neck supple.      Right lower leg: No edema.      Left lower leg: No edema.   Skin:     General: Skin is warm and dry.   Neurological:      Mental Status: She is alert and oriented to person, place, and time.   Psychiatric:         Mood and Affect: Mood  normal.         Vents:  Oxygen Concentration (%): 75 (01/06/22 0738)    Lines/Drains/Airways     Peripheral Intravenous Line                 Peripheral IV - Single Lumen 01/05/22 0530 20 G Left;Posterior Forearm 1 day                Significant Labs:    CBC/Anemia Profile:  Recent Labs   Lab 01/04/22  1807   FERRITIN 794*        Chemistries:  No results for input(s): NA, K, CL, CO2, BUN, CREATININE, CALCIUM, ALBUMIN, PROT, BILITOT, ALKPHOS, ALT, AST, GLUCOSE, MG, PHOS in the last 48 hours.    All pertinent labs within the past 24 hours have been reviewed.    Significant Imaging:  I have reviewed all pertinent imaging results/findings within the past 24 hours.     X-Ray Chest AP Portable  Narrative: EXAMINATION:  XR CHEST AP PORTABLE    CLINICAL HISTORY:  COVID-19 viral infection.    FINDINGS:  Comparison study 12/31/2021.  Shallow breath.  Again, there are extensive bilateral pulmonary infiltrates with patchy airspace consolidation mid and lower lung distribution, mostly similar to mildly progressive compared to prior study, most notably with progressive consolidation peripheral mid right lung.  Stable cardiomediastinal silhouette.  No pneumothorax.  No pleural fluid collection.  Impression: See above    Electronically signed by: Sorin Lewis MD  Date:    01/02/2022  Time:    13:56

## 2022-01-06 NOTE — SUBJECTIVE & OBJECTIVE
Interval History: pt sitting up in chair and stable upon exam with no signs of acute distress.  PT reports congestion with Zytrec and Flonase initiated. Vapotherm in place and weaning continued as tolerated. LTAC placement in progress. Pulmonology following.     Review of Systems   Constitutional: Positive for activity change (improved). Negative for chills and diaphoresis.   HENT: Positive for congestion.    Eyes: Negative.    Respiratory: Positive for cough and shortness of breath. Negative for chest tightness and wheezing.    Cardiovascular: Positive for leg swelling (trace at ankles ). Negative for chest pain and palpitations.   Gastrointestinal: Negative for abdominal pain, diarrhea, nausea and vomiting.   Genitourinary: Negative.    Musculoskeletal: Negative.    Skin: Negative.    Neurological: Negative for dizziness, weakness and headaches.   Psychiatric/Behavioral: The patient is not nervous/anxious.      Objective:     Vital Signs (Most Recent):  Temp: 98.1 °F (36.7 °C) (01/06/22 1206)  Pulse: 80 (01/06/22 1353)  Resp: 18 (01/06/22 1353)  BP: (!) 103/51 (01/06/22 1206)  SpO2: (!) 94 % (01/06/22 1353) Vital Signs (24h Range):  Temp:  [97.4 °F (36.3 °C)-98.1 °F (36.7 °C)] 98.1 °F (36.7 °C)  Pulse:  [63-85] 80  Resp:  [18-22] 18  SpO2:  [89 %-99 %] 94 %  BP: (103-137)/(51-97) 103/51     Weight: 90.4 kg (199 lb 4.7 oz)  Body mass index is 31.21 kg/m².    Intake/Output Summary (Last 24 hours) at 1/6/2022 1502  Last data filed at 1/6/2022 1400  Gross per 24 hour   Intake 1440 ml   Output --   Net 1440 ml      Physical Exam  Vitals and nursing note reviewed.   Constitutional:       Appearance: She is well-developed. She is ill-appearing.   HENT:      Head: Normocephalic and atraumatic.      Nose: Nose normal.   Eyes:      Conjunctiva/sclera: Conjunctivae normal.   Cardiovascular:      Rate and Rhythm: Normal rate and regular rhythm.      Heart sounds: Normal heart sounds. No murmur heard.  No friction rub. No  gallop.    Pulmonary:      Effort: No tachypnea, accessory muscle usage or respiratory distress.      Breath sounds: No decreased breath sounds or rales.      Comments: Left sided breath sound more decreased than right side  Abdominal:      General: Bowel sounds are normal. There is no distension.      Palpations: Abdomen is soft.      Tenderness: There is no abdominal tenderness.   Genitourinary:     Comments: Deferred   Musculoskeletal:         General: No swelling or tenderness. Normal range of motion.      Cervical back: Normal range of motion and neck supple.   Skin:     General: Skin is warm and dry.   Neurological:      Mental Status: She is alert and oriented to person, place, and time.   Psychiatric:         Behavior: Behavior normal.         Significant Labs: All pertinent labs within the past 24 hours have been reviewed.    Significant Imaging: I have reviewed all pertinent imaging results/findings within the past 24 hours.

## 2022-01-06 NOTE — ASSESSMENT & PLAN NOTE
+ COVID PCR  Normal WBC  Procalcitonin and BNP normal  Sputum culture pending  Supplemental oxygen to maintain sats > 92 %  Incentive spirometer  COVID inflammatory markers elevated  CTA of Chest Large volume solid and subsolid pulmonary opacities   Albuterol inhaler  CXR - worsening of the mixed interstitial and alveolar opacities throughout the periphery of the lungs  12/31 repeat CXR shows mild improvement   -1/2/22- Vapotherm in place- will wean as tolerated   -1/3/22- Vapotherm continued- prone position encouraged- Pulmonology following   -1/4/22- Vapotherm continued- prone positioning encouraged as tolerated- Pulm following  -1/5/22- Vapotherm in place with weaning tolerated- Social work consulted for LTAC placement   -1/6/22- Vapotherm in place with weaning continued as tolerated- LTAC placement in progress to [facility] pending

## 2022-01-06 NOTE — PLAN OF CARE
Pt remains fall free this shift.  Pt AAOx4, verbal, clear speech.  Skin warm and dry. No new skin issues.  Telemonitoring in progress, (SR mid 60s to 80s)  O2 (Vapotherm 25/75)   Bathroom privileges   Independent with transfers.  PO steroids   CBG AC/ HS with PRN SS insulin.  Bed low, side rails up x 2, wheels locked, call light in reach.  Bed alarm maintained for safety.  Patient instructed to call for assistance.  Hourly rounding completed.  24 hour chart check completed  POC updated and reviewed with pt. Will continue POC.

## 2022-01-06 NOTE — PROGRESS NOTES
[facility] - Tele[facility] ([facility])  Pulmonology  Progress Note    Patient Name: Yanira Medina  MRN: [id number]  Admission Date: 12/27/2021  Hospital Length of Stay: 9 days  Code Status: Full Code  Attending Provider: Joss Gonzalez MD  Primary Care Provider: Juvenal White MD   Principal Problem: Pneumonia due to COVID-19 virus    Subjective:     Interval History: 01/06/2022: seen and examined, improving, lung sounds with more movement, Tmax 98.1F, O2 HFNC 25LPM, waiting for authorization for [facility]    Review of Systems   Constitutional: Negative for chills and malaise/fatigue.   Respiratory: Positive for cough and shortness of breath. Negative for hemoptysis and sputum production.    Cardiovascular: Negative for chest pain and leg swelling.   Gastrointestinal: Negative for constipation.   Skin: Negative for rash.   All other systems reviewed and are negative.      Objective:     Vital Signs (Most Recent):  Temp: 98.1 °F (36.7 °C) (01/06/22 1206)  Pulse: 84 (01/06/22 1206)  Resp: 18 (01/06/22 1206)  BP: (!) 103/51 (01/06/22 1206)  SpO2: (!) 94 % (01/06/22 1206) Vital Signs (24h Range):  Temp:  [97.4 °F (36.3 °C)-98.1 °F (36.7 °C)] 98.1 °F (36.7 °C)  Pulse:  [63-85] 84  Resp:  [18-22] 18  SpO2:  [89 %-99 %] 94 %  BP: (103-137)/(51-97) 103/51     Weight: 90.4 kg (199 lb 4.7 oz)  Body mass index is 31.21 kg/m².      Intake/Output Summary (Last 24 hours) at 1/6/2022 1343  Last data filed at 1/6/2022 0553  Gross per 24 hour   Intake 1200 ml   Output --   Net 1200 ml       Physical Exam  Vitals reviewed.   Constitutional:       Appearance: She is not toxic-appearing.   HENT:      Mouth/Throat:      Mouth: Mucous membranes are moist.   Eyes:      Extraocular Movements: Extraocular movements intact.      Pupils: Pupils are equal, round, and reactive to light.   Cardiovascular:      Rate and Rhythm: Normal rate and regular rhythm.   Pulmonary:      Effort: Pulmonary effort is normal. No respiratory distress.      Breath  sounds: No rhonchi or rales.   Musculoskeletal:      Cervical back: Normal range of motion and neck supple.      Right lower leg: No edema.      Left lower leg: No edema.   Skin:     General: Skin is warm and dry.   Neurological:      Mental Status: She is alert and oriented to person, place, and time.   Psychiatric:         Mood and Affect: Mood normal.         Vents:  Oxygen Concentration (%): 75 (01/06/22 0738)    Lines/Drains/Airways     Peripheral Intravenous Line                 Peripheral IV - Single Lumen 01/05/22 0530 20 G Left;Posterior Forearm 1 day                Significant Labs:    CBC/Anemia Profile:  Recent Labs   Lab 01/04/22  1807   FERRITIN 794*        Chemistries:  No results for input(s): NA, K, CL, CO2, BUN, CREATININE, CALCIUM, ALBUMIN, PROT, BILITOT, ALKPHOS, ALT, AST, GLUCOSE, MG, PHOS in the last 48 hours.    All pertinent labs within the past 24 hours have been reviewed.    Significant Imaging:  I have reviewed all pertinent imaging results/findings within the past 24 hours.     X-Ray Chest AP Portable  Narrative: EXAMINATION:  XR CHEST AP PORTABLE    CLINICAL HISTORY:  COVID-19 viral infection.    FINDINGS:  Comparison study 12/31/2021.  Shallow breath.  Again, there are extensive bilateral pulmonary infiltrates with patchy airspace consolidation mid and lower lung distribution, mostly similar to mildly progressive compared to prior study, most notably with progressive consolidation peripheral mid right lung.  Stable cardiomediastinal silhouette.  No pneumothorax.  No pleural fluid collection.  Impression: See above    Electronically signed by: Sorin Lewis MD  Date:    01/02/2022  Time:    13:56          ABG  No results for input(s): PH, PO2, PCO2, HCO3, BE in the last 168 hours.  Assessment/Plan:     * Pneumonia due to COVID-19 virus  Covid PCR +ve  LMWH  Isolation  Vacination on recovery  Pending [facility] - will need follow up with her  pulmonologist Dr. Cordero at  Clinic upon discharge of facility      Mild intermittent asthma without complication  Albuterol      Alpha-1-antitrypsin deficiency carrier  MZ phenotype  Normal PFT last office visit    Acute hypoxemic respiratory failure  Patient with Hypoxic Respiratory failure which is Acute.  she is not on home oxygen. Supplemental oxygen was provided and noted- Oxygen Concentration (%):  [75] 75.   Signs/symptoms of respiratory failure include- tachypnea and increased work of breathing. Contributing diagnoses includes - Pneumonia and SARS covid 19 Labs and images were reviewed. Patient Has recent ABG, which has been reviewed. Will treat underlying causes and adjust management of respiratory failure   as follows-      Supplemental Oxygen keep O2>90%  Incentive spirometry   Bronchodilators  Home meds ASMANEX           ALEM ArciniegaC  Pulmonology  [facility] - Telemetry ([facility])

## 2022-01-06 NOTE — ASSESSMENT & PLAN NOTE
+ COVID PCR  Normal WBC  Procalcitonin and BNP normal  Sputum culture pending  Supplemental oxygen to maintain sats > 92 %  Incentive spirometer  COVID inflammatory markers elevated  CTA of Chest Large volume solid and subsolid pulmonary opacities   Albuterol inhaler  CXR - worsening of the mixed interstitial and alveolar opacities throughout the periphery of the lungs  12/31 repeat CXR shows mild improvement   -1/2/22- Vapotherm in place- will wean as tolerated   -1/3/22- Vapotherm continued- prone position encouraged- Pulmonology following   -1/4/22- Vapotherm continued- prone positioning encouraged as tolerated- Pulm following  -1/5/22- Vapotherm in place with weaning tolerated- Social work consulted for LTAC placement

## 2022-01-06 NOTE — PROGRESS NOTES
[facility] Medicine  Progress Note    Patient Name: Yanira Medina  MRN: [id number]  Patient Class: IP- Inpatient   Admission Date: 12/27/2021  Length of Stay: 9 days  Attending Physician: Joss Gonzalez MD  Primary Care Provider: Juvenal White MD        Subjective:     Principal Problem:Pneumonia due to COVID-19 virus        HPI:  Yanira Medina is a 46 y.o. female who presents with 2-3 days of gradual onset, worsening, severe SOB with associated chest tightness and cough at home.  Multiple family members sick with viral/URI symptoms and one tested positive for COVID-19. Pt explains approx 3 days ago she developed sinus symptoms, severe headache, chills, sweats and had 1 episode of severe vomiting. The symptoms commenced to cough and SOB. Pt denies any recorded fevers. She notes SOB with exertion but reports feeling better as her headache has resolved. In the ED, her pulse ox was 86 % with exercise. COVID rapid screen was negative. COVID marker labs are pending. CXR - scattered pulmonary infiltrates. CTA of Chest is pending for further clarification and to rule out pulmonary embolism. Currently, pt is requiring 3 to 4 liters of oxygen via nasal cannula. Pt denies admission to hospital for asthma exacerbation.   CBC notes a left shift at 90 %, Alk Phos 207, AST 50, ALT 45.  COVID screen was negative  As clarification, on 12/27/2021 patient should be admitted for hospital observation services under my care in collaboration with Indu Freeman MD    Admitting diagnoses include pneumonia and hypoxic respiratory failure.          Overview/Hospital Course:  Worsening hypoxic respiratory failure overnight. Now on 15 L HFNC. Mild tachypnea but no obvious distress or increased work of breathing. COVID PCR pending. COVID inflammatory markers are elevated. Procalcitonin and BNP negative. CTA of Chest negative for PE but large volue solid and subsolid pulmonary opacities concerning  for COVID. Pt with hx of asthma and alpha tripsyn 1. Initially she refused remdesivir infusion but after seen by Dr. Cannon - pt agreeable to receive remdesivir. Strongly suspect COVID pneumonia due to clinical picture and physical exam.   12/29 Pt changed her mind about remdesivir. Also, refused Lovenox therapeutic anticoagulation. Today + COVID screen. Overnight increased oxygen demand due to worsened hypoxia with exertion. Pt wearing both 15 L and venti mask with oxygen at 33 %. SpO2 92 %. Mild tachypnea but no increased work of breathing. CXR worsened. Pt highly motivated with cough, deep breath and use of IS. Explained rationale of remdesevir but pt refuses administration. This was explained by both Nabil Cannon and Xochitl. Decadron and albuterol continued.   12/30 - Pt states she found out that [facility] does not have beds. She is okay with staying here and continuing treatment. She verbalizes understanding of the high level of oxygen requirement. She had an uneventful night and denies any changes in her breathing. Her cough is productive of pale yellow sputum with small amount blood tinged. She is less anxious today - pleasant demeanor. Highly motivated in cough/deep breathing and use of incentive spirometer. Currently on 15 L nasal cannula with  SpO2 95  %. Decadron, albuterol and LMWH in progress. She has mild edema to right foot which patient says is chronic. She denies any leg pain. Slight elevation in liver enzymes from baseline AST 41 >> 89; ALT 38 >> 94.  CRP down; ferritin &  sed rate more elevated.   12/31- Patient OOB, dyspnea improving. HFNC weaned down to 13 L, patient tolerating well, SpO2 93%. CXR reviewed- mild improvement noted. Continue decadron, albuterol and LMWH. Slight elevation in liver enzymes from baseline AST 41 >> 89; ALT 38 >> 94. No acute findings on abdominal US.   1/1/22 - CXR from 12/31 shows very mild improvement of infiltrates. Today, she describes fatigue. Endorses a  productive cough. COVID inflammatory markers are improving. Slight elevation is liver enzymes. Was on 15 L oxygen and now on 13 L oxygen but uses a venti mask on top of the nasal cannula at times. Describes nasal congestion - saline and afrin prescribed.   1/2/22- pt sitting up in bed with Vapotherm in place and reports symptom improvement. Leukocytosis noted in the setting of steroid use. Zinc initiated. Vitamin D level pending.  Plan of care discussed with patient and daughter at bedside and understanding verbalized.   1/3/22- pt sitting in chair and reports compliance with incentive spirometry. Pt remains on Vapotherm with oxygen saturations from 91%-93%. Buspar nightly requested to assist with anxiety.  Pt encouraged to remain in prone position as tolerated with understanding verbalized.  Current plan of care continued as previously prescribed.   On 1/4/22, pt sitting to side of bed with Vapotherm in place and O2 sats 95%-91%.  Will wean as tolerated. Pt encouraged to prone with understanding verbalized.  Current plan of care continued as previously prescribed.   On 1/5/22- pt sitting up in chair and continues to tolerate weaning of Vapotherm.  Social work consulted to assist with discharge planning and LTAC placement and referrals placed.  Pt remains stable and verbalized symptom improvement with no signs of acute distress.  Current plan of care continued as previously prescribed. On 1/6/22, pt stable on Vapotherm with weaning continued as tolerated.  LTAC placement in progress.        Interval History: pt sitting up in chair and stable upon exam with no signs of acute distress.  PT reports congestion with Zytrec and Flonase initiated. Vapotherm in place and weaning continued as tolerated. LTAC placement in progress. Pulmonology following.     Review of Systems   Constitutional: Positive for activity change (improved). Negative for chills and diaphoresis.   HENT: Positive for congestion.    Eyes: Negative.     Respiratory: Positive for cough and shortness of breath. Negative for chest tightness and wheezing.    Cardiovascular: Positive for leg swelling (trace at ankles ). Negative for chest pain and palpitations.   Gastrointestinal: Negative for abdominal pain, diarrhea, nausea and vomiting.   Genitourinary: Negative.    Musculoskeletal: Negative.    Skin: Negative.    Neurological: Negative for dizziness, weakness and headaches.   Psychiatric/Behavioral: The patient is not nervous/anxious.      Objective:     Vital Signs (Most Recent):  Temp: 98.1 °F (36.7 °C) (01/06/22 1206)  Pulse: 80 (01/06/22 1353)  Resp: 18 (01/06/22 1353)  BP: (!) 103/51 (01/06/22 1206)  SpO2: (!) 94 % (01/06/22 1353) Vital Signs (24h Range):  Temp:  [97.4 °F (36.3 °C)-98.1 °F (36.7 °C)] 98.1 °F (36.7 °C)  Pulse:  [63-85] 80  Resp:  [18-22] 18  SpO2:  [89 %-99 %] 94 %  BP: (103-137)/(51-97) 103/51     Weight: 90.4 kg (199 lb 4.7 oz)  Body mass index is 31.21 kg/m².    Intake/Output Summary (Last 24 hours) at 1/6/2022 1502  Last data filed at 1/6/2022 1400  Gross per 24 hour   Intake 1440 ml   Output --   Net 1440 ml      Physical Exam  Vitals and nursing note reviewed.   Constitutional:       Appearance: She is well-developed. She is ill-appearing.   HENT:      Head: Normocephalic and atraumatic.      Nose: Nose normal.   Eyes:      Conjunctiva/sclera: Conjunctivae normal.   Cardiovascular:      Rate and Rhythm: Normal rate and regular rhythm.      Heart sounds: Normal heart sounds. No murmur heard.  No friction rub. No gallop.    Pulmonary:      Effort: No tachypnea, accessory muscle usage or respiratory distress.      Breath sounds: No decreased breath sounds or rales.      Comments: Left sided breath sound more decreased than right side  Abdominal:      General: Bowel sounds are normal. There is no distension.      Palpations: Abdomen is soft.      Tenderness: There is no abdominal tenderness.   Genitourinary:     Comments: Deferred    Musculoskeletal:         General: No swelling or tenderness. Normal range of motion.      Cervical back: Normal range of motion and neck supple.   Skin:     General: Skin is warm and dry.   Neurological:      Mental Status: She is alert and oriented to person, place, and time.   Psychiatric:         Behavior: Behavior normal.         Significant Labs: All pertinent labs within the past 24 hours have been reviewed.    Significant Imaging: I have reviewed all pertinent imaging results/findings within the past 24 hours.      Assessment/Plan:      * Pneumonia due to COVID-19 virus  + COVID PCR  Normal WBC  Procalcitonin and BNP normal  Sputum culture pending  Supplemental oxygen to maintain sats > 92 %  Incentive spirometer  COVID inflammatory markers elevated  CTA of Chest Large volume solid and subsolid pulmonary opacities   Albuterol inhaler  CXR - worsening of the mixed interstitial and alveolar opacities throughout the periphery of the lungs  12/31 repeat CXR shows mild improvement   -1/2/22- Vapotherm in place- will wean as tolerated   -1/3/22- Vapotherm continued- prone position encouraged- Pulmonology following   -1/4/22- Vapotherm continued- prone positioning encouraged as tolerated- Pulm following  -1/5/22- Vapotherm in place with weaning tolerated- Social work consulted for LTAC placement   -1/6/22- Vapotherm in place with weaning continued as tolerated- LTAC placement in progress to [facility] pending         Abnormal LFTs  -in the setting of COVID   Slowly trending down  No acute findings noted on liver US.       Abnormality of albumin  -in the setting of viral syndrome   Dietitian advised increased protein intake  -compliance with recommendation encouraged       Mild intermittent asthma without complication  -Albuterol      Alpha-1-antitrypsin deficiency carrier  MZ phenotype  -outpatient follow up       Acute hypoxemic respiratory failure  Patient with Hypoxic Respiratory failure which is Acute.  she  is not on home oxygen. Supplemental oxygen was provided and noted- Oxygen Concentration (%):  [75] 75.   Signs/symptoms of respiratory failure include- tachypnea. Contributing diagnoses includes - asthma Labs and images were reviewed. Patient Has not had a recent ABG. Will treat underlying causes and adjust management of respiratory failure   Hypoxia secondary to pneumonia  Suspected to be viral in nature - COVID + on 12/29/21  Normal WBC  Procalcitonin and BNP are normal  Sputum culture  Supplemental oxygen to maintain sats > 92 %- Vapotherm in place- will wean as tolerated   Incentive spirometer  CTA of Chest - Large volume solid and subsolid pulmonary opacities   -Pulmonology following-     Adult hypothyroidism  Continue synthroid        VTE Risk Mitigation (From admission, onward)         Ordered     enoxaparin injection 90 mg  2 times daily         12/28/21 0825     IP VTE HIGH RISK PATIENT  Once         12/27/21 1759     Place sequential compression device  Until discontinued         12/27/21 1759                Discharge Planning   SANDRA:      Code Status: Full Code   Is the patient medically ready for discharge?:     Reason for patient still in hospital (select all that apply): Patient trending condition, Laboratory test, Treatment and Consult recommendations  Discharge Plan A: Home with family                  Sangeeta Canseco NP  Department of Hospital Medicine   [facility] - Telemetry ([facility])

## 2022-01-06 NOTE — ASSESSMENT & PLAN NOTE
Patient with Hypoxic Respiratory failure which is Acute.  she is not on home oxygen. Supplemental oxygen was provided and noted- Oxygen Concentration (%):  [75] 75.   Signs/symptoms of respiratory failure include- tachypnea and increased work of breathing. Contributing diagnoses includes - Pneumonia and SARS covid 19 Labs and images were reviewed. Patient Has recent ABG, which has been reviewed. Will treat underlying causes and adjust management of respiratory failure   as follows-      Supplemental Oxygen keep O2>90%  Incentive spirometry   Bronchodilators  Home meds ASMANEX

## 2022-01-06 NOTE — PLAN OF CARE
ANIKA spoke with NP who states patient is clinically ready for LTAC placement. ANIKA received phone call from Nancy with [facility]. Nancy to reach out to patient/family this morning. Per Clarice with [facility] patient would need to be on Vapotherm 20 liters O2 for facility to accept. Patient O2 demands are too high at this time at 25.    Awaiting outcome of Laine's discussion with patient/family.

## 2022-01-06 NOTE — ASSESSMENT & PLAN NOTE
Covid PCR +ve  LMWH  Isolation  Vacination on recovery  Pending [facility] facility [facility] - will need follow up with her pulmonologist Dr. Cordero at  Clinic upon discharge of facility

## 2022-01-06 NOTE — ASSESSMENT & PLAN NOTE
Patient with Hypoxic Respiratory failure which is Acute.  she is not on home oxygen. Supplemental oxygen was provided and noted- Oxygen Concentration (%):  [75] 75.   Signs/symptoms of respiratory failure include- tachypnea. Contributing diagnoses includes - asthma Labs and images were reviewed. Patient Has not had a recent ABG. Will treat underlying causes and adjust management of respiratory failure   Hypoxia secondary to pneumonia  Suspected to be viral in nature - COVID + on 12/29/21  Normal WBC  Procalcitonin and BNP are normal  Sputum culture  Supplemental oxygen to maintain sats > 92 %- Vapotherm in place- will wean as tolerated   Incentive spirometer  CTA of Chest - Large volume solid and subsolid pulmonary opacities   -Pulmonology following-

## 2022-01-06 NOTE — ASSESSMENT & PLAN NOTE
Patient with Hypoxic Respiratory failure which is Acute.  she is not on home oxygen. Supplemental oxygen was provided and noted- Oxygen Concentration (%):  [75-80] 75.   Signs/symptoms of respiratory failure include- tachypnea. Contributing diagnoses includes - asthma Labs and images were reviewed. Patient Has not had a recent ABG. Will treat underlying causes and adjust management of respiratory failure   Hypoxia secondary to pneumonia  Suspected to be viral in nature - COVID + on 12/29/21  Normal WBC  Procalcitonin and BNP are normal  Sputum culture  Supplemental oxygen to maintain sats > 92 %- Vapotherm in place- will wean as tolerated   Incentive spirometer  CTA of Chest - Large volume solid and subsolid pulmonary opacities   -Pulmonology following-

## 2022-01-06 NOTE — PLAN OF CARE
CM spoke with patient over phone. Confirms she spoke with Nancy from [facility]. CM informed that [facility] is still clinically reviewing and has not clinically accepted. Patient wishes to pursue placement with [facility] and states she feels comfortable with this plan. CM reviewed insurance auth process per patient request.    Nancy with [facility] has submitted for LTAC auth.

## 2022-01-06 NOTE — SUBJECTIVE & OBJECTIVE
Interval History: pt sitting up in chair and continues to tolerate weaning of Vapotherm.  Social work consulted to assist with discharge planning and LTAC placement and referrals placed.  Pt remains stable and verbalized symptom improvement with no signs of acute distress.  Current plan of care continued as previously prescribed.     Review of Systems   Constitutional: Positive for activity change (improved). Negative for chills and diaphoresis.   HENT: Negative for congestion.    Eyes: Negative.    Respiratory: Positive for cough, chest tightness (improved) and shortness of breath. Negative for wheezing.    Cardiovascular: Positive for leg swelling (trace at ankles ). Negative for chest pain and palpitations.   Gastrointestinal: Negative for abdominal pain, diarrhea, nausea and vomiting.   Genitourinary: Negative.    Musculoskeletal: Negative.    Skin: Negative.    Neurological: Negative for dizziness, weakness and headaches.   Psychiatric/Behavioral: The patient is nervous/anxious (improved).      Objective:     Vital Signs (Most Recent):  Temp: 98 °F (36.7 °C) (01/05/22 1533)  Pulse: 73 (01/05/22 1533)  Resp: (!) 22 (01/05/22 1533)  BP: (!) 125/58 (01/05/22 1533)  SpO2: (!) 93 % (01/05/22 1533) Vital Signs (24h Range):  Temp:  [97.8 °F (36.6 °C)-98.6 °F (37 °C)] 98 °F (36.7 °C)  Pulse:  [58-89] 73  Resp:  [17-22] 22  SpO2:  [90 %-100 %] 93 %  BP: (105-126)/(57-72) 125/58     Weight: 90.4 kg (199 lb 4.7 oz)  Body mass index is 31.21 kg/m².    Intake/Output Summary (Last 24 hours) at 1/5/2022 1804  Last data filed at 1/5/2022 0500  Gross per 24 hour   Intake --   Output 800 ml   Net -800 ml      Physical Exam  Vitals and nursing note reviewed.   Constitutional:       Appearance: She is well-developed. She is ill-appearing.   HENT:      Head: Normocephalic and atraumatic.      Nose: Nose normal.   Eyes:      Conjunctiva/sclera: Conjunctivae normal.   Cardiovascular:      Rate and Rhythm: Normal rate and regular  rhythm.      Heart sounds: Normal heart sounds. No murmur heard.  No friction rub. No gallop.    Pulmonary:      Effort: No tachypnea, accessory muscle usage or respiratory distress.      Breath sounds: No decreased breath sounds or rales.      Comments: Left sided breath sound more decreased than right side  Abdominal:      General: Bowel sounds are normal. There is no distension.      Palpations: Abdomen is soft.      Tenderness: There is no abdominal tenderness.   Genitourinary:     Comments: Deferred   Musculoskeletal:         General: No swelling or tenderness. Normal range of motion.      Cervical back: Normal range of motion and neck supple.   Skin:     General: Skin is warm and dry.   Neurological:      Mental Status: She is alert and oriented to person, place, and time.   Psychiatric:         Behavior: Behavior normal.         Significant Labs:   All pertinent labs within the past 24 hours have been reviewed.  CBC:   Recent Labs   Lab 01/04/22  0829   WBC 12.13   HGB 12.0   HCT 36.6*        CMP:   Recent Labs   Lab 01/04/22  0829      K 3.7      CO2 25   GLU 78   BUN 12   CREATININE 0.6   CALCIUM 8.8   PROT 7.1   ALBUMIN 2.6*   BILITOT 0.4   ALKPHOS 346*   AST 47*   ALT 93*   ANIONGAP 13   EGFRNONAA >60       Significant Imaging:   Imaging Results          X-Ray Chest AP Portable (Final result)  Result time 12/27/21 09:27:27    Final result by Mateo Wren MD (12/27/21 09:27:27)                 Impression:      Scattered pulmonary infiltrates. Findings are not entirely specific but can be seen with multifocal airspace disease.      Electronically signed by: Mateo Wren MD  Date:    12/27/2021  Time:    09:27             Narrative:    EXAMINATION:  XR CHEST AP PORTABLE    CLINICAL HISTORY:  Shortness of breath    FINDINGS:  Single view of the chest.  04/24/2018    Cardiac silhouette is normal.  Scattered pulmonary infiltrates. Findings are not entirely specific but can be seen with  multifocal airspace disease.  No evidence of pleural effusion or pneumothorax.  Bones appear intact.

## 2022-01-07 LAB
BASOPHILS # BLD AUTO: 0.03 K/UL (ref 0–0.2)
BASOPHILS NFR BLD: 0.2 % (ref 0–1.9)
CRP SERPL-MCNC: 33.75 MG/L (ref 0–3.19)
DIFFERENTIAL METHOD: ABNORMAL
EOSINOPHIL # BLD AUTO: 0.2 K/UL (ref 0–0.5)
EOSINOPHIL NFR BLD: 1.2 % (ref 0–8)
ERYTHROCYTE [DISTWIDTH] IN BLOOD BY AUTOMATED COUNT: 12.6 % (ref 11.5–14.5)
HCT VFR BLD AUTO: 37.6 % (ref 37–48.5)
HGB BLD-MCNC: 12 G/DL (ref 12–16)
IMM GRANULOCYTES # BLD AUTO: 0.26 K/UL (ref 0–0.04)
IMM GRANULOCYTES NFR BLD AUTO: 1.9 % (ref 0–0.5)
LYMPHOCYTES # BLD AUTO: 2.5 K/UL (ref 1–4.8)
LYMPHOCYTES NFR BLD: 18.2 % (ref 18–48)
MCH RBC QN AUTO: 31.5 PG (ref 27–31)
MCHC RBC AUTO-ENTMCNC: 31.9 G/DL (ref 32–36)
MCV RBC AUTO: 99 FL (ref 82–98)
MONOCYTES # BLD AUTO: 0.7 K/UL (ref 0.3–1)
MONOCYTES NFR BLD: 4.8 % (ref 4–15)
NEUTROPHILS # BLD AUTO: 10 K/UL (ref 1.8–7.7)
NEUTROPHILS NFR BLD: 73.7 % (ref 38–73)
NRBC BLD-RTO: 0 /100 WBC
PLATELET # BLD AUTO: 356 K/UL (ref 150–450)
PMV BLD AUTO: 10.1 FL (ref 9.2–12.9)
RBC # BLD AUTO: 3.81 M/UL (ref 4–5.4)
WBC # BLD AUTO: 13.54 K/UL (ref 3.9–12.7)

## 2022-01-07 PROCEDURE — 99900035 HC TECH TIME PER 15 MIN (STAT)

## 2022-01-07 PROCEDURE — 94799 UNLISTED PULMONARY SVC/PX: CPT

## 2022-01-07 PROCEDURE — 36415 COLL VENOUS BLD VENIPUNCTURE: CPT | Performed by: FAMILY MEDICINE

## 2022-01-07 PROCEDURE — 21400001 HC TELEMETRY ROOM

## 2022-01-07 PROCEDURE — 94761 N-INVAS EAR/PLS OXIMETRY MLT: CPT

## 2022-01-07 PROCEDURE — 25000003 PHARM REV CODE 250: Performed by: NURSE PRACTITIONER

## 2022-01-07 PROCEDURE — 27000249 HC VAPOTHERM CIRCUIT

## 2022-01-07 PROCEDURE — 63600175 PHARM REV CODE 636 W HCPCS: Performed by: NURSE PRACTITIONER

## 2022-01-07 PROCEDURE — 27000207 HC ISOLATION

## 2022-01-07 PROCEDURE — 27100171 HC OXYGEN HIGH FLOW UP TO 24 HOURS

## 2022-01-07 PROCEDURE — 85025 COMPLETE CBC W/AUTO DIFF WBC: CPT | Performed by: FAMILY MEDICINE

## 2022-01-07 PROCEDURE — 94640 AIRWAY INHALATION TREATMENT: CPT

## 2022-01-07 RX ADMIN — FLUTICASONE PROPIONATE 100 MCG: 50 SPRAY, METERED NASAL at 08:01

## 2022-01-07 RX ADMIN — FAMOTIDINE 20 MG: 20 TABLET ORAL at 08:01

## 2022-01-07 RX ADMIN — OXYCODONE HYDROCHLORIDE AND ACETAMINOPHEN 500 MG: 500 TABLET ORAL at 08:01

## 2022-01-07 RX ADMIN — CETIRIZINE HYDROCHLORIDE 10 MG: 10 TABLET, FILM COATED ORAL at 08:01

## 2022-01-07 RX ADMIN — ACETAMINOPHEN 650 MG: 325 TABLET ORAL at 03:01

## 2022-01-07 RX ADMIN — ENOXAPARIN SODIUM 90 MG: 100 INJECTION SUBCUTANEOUS at 09:01

## 2022-01-07 RX ADMIN — ENOXAPARIN SODIUM 90 MG: 100 INJECTION SUBCUTANEOUS at 08:01

## 2022-01-07 RX ADMIN — GUAIFENESIN 600 MG: 600 TABLET, EXTENDED RELEASE ORAL at 09:01

## 2022-01-07 RX ADMIN — BUSPIRONE HYDROCHLORIDE 5 MG: 5 TABLET ORAL at 09:01

## 2022-01-07 RX ADMIN — Medication 1000 UNITS: at 08:01

## 2022-01-07 RX ADMIN — THERA TABS 1 TABLET: TAB at 08:01

## 2022-01-07 RX ADMIN — OXYCODONE HYDROCHLORIDE AND ACETAMINOPHEN 500 MG: 500 TABLET ORAL at 09:01

## 2022-01-07 RX ADMIN — FAMOTIDINE 20 MG: 20 TABLET ORAL at 09:01

## 2022-01-07 RX ADMIN — ACETAMINOPHEN 650 MG: 325 TABLET ORAL at 09:01

## 2022-01-07 RX ADMIN — ALBUTEROL SULFATE 2 PUFF: 90 AEROSOL, METERED RESPIRATORY (INHALATION) at 01:01

## 2022-01-07 RX ADMIN — ZINC SULFATE 220 MG (50 MG) CAPSULE 220 MG: CAPSULE at 08:01

## 2022-01-07 RX ADMIN — ALBUTEROL SULFATE 2 PUFF: 90 AEROSOL, METERED RESPIRATORY (INHALATION) at 12:01

## 2022-01-07 RX ADMIN — GUAIFENESIN 600 MG: 600 TABLET, EXTENDED RELEASE ORAL at 08:01

## 2022-01-07 RX ADMIN — ALBUTEROL SULFATE 2 PUFF: 90 AEROSOL, METERED RESPIRATORY (INHALATION) at 07:01

## 2022-01-07 NOTE — PLAN OF CARE
Pt remains fall free this shift.  Pt AAOx4, verbal, clear speech.  Skin warm and dry. No new skin issues.  Telemonitoring in progress, (SR mid 60s to 80s)  O2 (Vapotherm 25/75)   Bathroom privileges   Independent with transfers.  CBG AC/ HS with PRN SS insulin.  Bed low, side rails up x 2, wheels locked, call light in reach.  Bed alarm maintained for safety.  Patient instructed to call for assistance.  Hourly rounding completed.  24 hour chart check completed  POC updated and reviewed with pt. Will continue POC.

## 2022-01-07 NOTE — PROGRESS NOTES
[facility] Medicine  Progress Note    Patient Name: Yanira Medina  MRN: [id number]  Patient Class: IP- Inpatient   Admission Date: 12/27/2021  Length of Stay: 10 days  Attending Physician: Indu Freeman MD  Primary Care Provider: Juvenal White MD    Subjective:     Principal Problem:Pneumonia due to COVID-19 virus        HPI:  Yanira Medina is a 46 y.o. female who presents with 2-3 days of gradual onset, worsening, severe SOB with associated chest tightness and cough at home.  Multiple family members sick with viral/URI symptoms and one tested positive for COVID-19. Pt explains approx 3 days ago she developed sinus symptoms, severe headache, chills, sweats and had 1 episode of severe vomiting. The symptoms commenced to cough and SOB. Pt denies any recorded fevers. She notes SOB with exertion but reports feeling better as her headache has resolved. In the ED, her pulse ox was 86 % with exercise. COVID rapid screen was negative. COVID marker labs are pending. CXR - scattered pulmonary infiltrates. CTA of Chest is pending for further clarification and to rule out pulmonary embolism. Currently, pt is requiring 3 to 4 liters of oxygen via nasal cannula. Pt denies admission to hospital for asthma exacerbation.   CBC notes a left shift at 90 %, Alk Phos 207, AST 50, ALT 45.  COVID screen was negative  As clarification, on 12/27/2021 patient should be admitted for hospital observation services under my care in collaboration with Indu Freeman MD    Admitting diagnoses include pneumonia and hypoxic respiratory failure.          Overview/Hospital Course:  Worsening hypoxic respiratory failure overnight. Now on 15 L HFNC. Mild tachypnea but no obvious distress or increased work of breathing. COVID PCR pending. COVID inflammatory markers are elevated. Procalcitonin and BNP negative. CTA of Chest negative for PE but large volue solid and subsolid pulmonary opacities  concerning for COVID. Pt with hx of asthma and alpha tripsyn 1. Initially she refused remdesivir infusion but after seen by Dr. Cannon - pt agreeable to receive remdesivir. Strongly suspect COVID pneumonia due to clinical picture and physical exam.   12/29 Pt changed her mind about remdesivir. Also, refused Lovenox therapeutic anticoagulation. Today + COVID screen. Overnight increased oxygen demand due to worsened hypoxia with exertion. Pt wearing both 15 L and venti mask with oxygen at 33 %. SpO2 92 %. Mild tachypnea but no increased work of breathing. CXR worsened. Pt highly motivated with cough, deep breath and use of IS. Explained rationale of remdesevir but pt refuses administration. This was explained by both Nabil Cannon and Xochitl. Decadron and albuterol continued.   12/30 - Pt states she found out that [facility] does not have beds. She is okay with staying here and continuing treatment. She verbalizes understanding of the high level of oxygen requirement. She had an uneventful night and denies any changes in her breathing. Her cough is productive of pale yellow sputum with small amount blood tinged. She is less anxious today - pleasant demeanor. Highly motivated in cough/deep breathing and use of incentive spirometer. Currently on 15 L nasal cannula with  SpO2 95  %. Decadron, albuterol and LMWH in progress. She has mild edema to right foot which patient says is chronic. She denies any leg pain. Slight elevation in liver enzymes from baseline AST 41 >> 89; ALT 38 >> 94.  CRP down; ferritin &  sed rate more elevated.   12/31- Patient OOB, dyspnea improving. HFNC weaned down to 13 L, patient tolerating well, SpO2 93%. CXR reviewed- mild improvement noted. Continue decadron, albuterol and LMWH. Slight elevation in liver enzymes from baseline AST 41 >> 89; ALT 38 >> 94. No acute findings on abdominal US.   1/1/22 - CXR from 12/31 shows very mild improvement of infiltrates. Today, she describes fatigue.  Endorses a productive cough. COVID inflammatory markers are improving. Slight elevation is liver enzymes. Was on 15 L oxygen and now on 13 L oxygen but uses a venti mask on top of the nasal cannula at times. Describes nasal congestion - saline and afrin prescribed.   1/2/22- pt sitting up in bed with Vapotherm in place and reports symptom improvement. Leukocytosis noted in the setting of steroid use. Zinc initiated. Vitamin D level pending.  Plan of care discussed with patient and daughter at bedside and understanding verbalized.   1/3/22- pt sitting in chair and reports compliance with incentive spirometry. Pt remains on Vapotherm with oxygen saturations from 91%-93%. Buspar nightly requested to assist with anxiety.  Pt encouraged to remain in prone position as tolerated with understanding verbalized.  Current plan of care continued as previously prescribed.   On 1/4/22, pt sitting to side of bed with Vapotherm in place and O2 sats 95%-91%.  Will wean as tolerated. Pt encouraged to prone with understanding verbalized.  Current plan of care continued as previously prescribed.   On 1/5/22- pt sitting up in chair and continues to tolerate weaning of Vapotherm.  Social work consulted to assist with discharge planning and LTAC placement and referrals placed.  Pt remains stable and verbalized symptom improvement with no signs of acute distress.  Current plan of care continued as previously prescribed.   On 1/6/22, pt stable on Vapotherm with weaning continued as tolerated.  LTAC placement in progress.    1/7/22-inflammatory markers from 1/6/22 are stable. CRP, LDH, and SED continues to trend down. Ferritin slightly higher, but not a significant increase. Has been stable on 25L vapotherm. Noted slight increase in leukocytosis. Awaiting authorization for [facility].      Interval History: doing well. Family member at bedside.awaiting placement.    Review of Systems   Constitutional: Positive for activity change (improved).  Negative for chills and diaphoresis.   HENT: Positive for congestion.    Eyes: Negative.    Respiratory: Positive for cough and shortness of breath. Negative for chest tightness and wheezing.    Cardiovascular: Positive for leg swelling (trace at ankles ). Negative for chest pain and palpitations.   Gastrointestinal: Negative for abdominal pain, diarrhea, nausea and vomiting.   Genitourinary: Negative.    Musculoskeletal: Negative.    Skin: Negative.    Neurological: Negative for dizziness, weakness and headaches.   Psychiatric/Behavioral: The patient is not nervous/anxious.         Objective:     Vital Signs (Most Recent):  Temp: 98 °F (36.7 °C) (01/07/22 1148)  Pulse: 86 (01/07/22 1305)  Resp: (!) 24 (01/07/22 1305)  BP: (!) 100/55 (01/07/22 1148)  SpO2: (!) 90 % (01/07/22 1305) Vital Signs (24h Range):  Temp:  [96.8 °F (36 °C)-98.4 °F (36.9 °C)] 98 °F (36.7 °C)  Pulse:  [] 86  Resp:  [17-25] 24  SpO2:  [90 %-100 %] 90 %  BP: (100-131)/(55-67) 100/55     Weight: 90.4 kg (199 lb 4.7 oz)  Body mass index is 31.21 kg/m².    Intake/Output Summary (Last 24 hours) at 1/7/2022 1421  Last data filed at 1/7/2022 0915  Gross per 24 hour   Intake 1830 ml   Output 1400 ml   Net 430 ml      Physical Exam  Vitals and nursing note reviewed.   Constitutional:       Appearance: She is well-developed. She is ill-appearing.   HENT:      Head: Normocephalic and atraumatic.      Nose: Nose normal.   Eyes:      Conjunctiva/sclera: Conjunctivae normal.   Cardiovascular:      Rate and Rhythm: Normal rate and regular rhythm.      Heart sounds: Normal heart sounds. No murmur heard.  No friction rub. No gallop.    Pulmonary:      Effort: No tachypnea, accessory muscle usage or respiratory distress.      Breath sounds: No decreased breath sounds or rales.      Comments: Left sided breath sound more decreased than right side  Abdominal:      General: Bowel sounds are normal. There is no distension.      Palpations: Abdomen is soft.       Tenderness: There is no abdominal tenderness.   Genitourinary:     Comments: Deferred   Musculoskeletal:         General: No swelling or tenderness. Normal range of motion.      Cervical back: Normal range of motion and neck supple.   Skin:     General: Skin is warm and dry.   Neurological:      Mental Status: She is alert and oriented to person, place, and time.   Psychiatric:         Behavior: Behavior normal.       Significant Labs:   All pertinent labs within the past 24 hours have been reviewed.  CBC:   Recent Labs   Lab 01/07/22  0510   WBC 13.54*   HGB 12.0   HCT 37.6        Significant Imaging: I have reviewed all pertinent imaging results/findings within the past 24 hours.      Assessment/Plan:      * Pneumonia due to COVID-19 virus  + COVID PCR  Normal WBC  Procalcitonin and BNP normal  Sputum culture pending  Supplemental oxygen to maintain sats > 92 %  Incentive spirometer  COVID inflammatory markers elevated  CTA of Chest Large volume solid and subsolid pulmonary opacities   Albuterol inhaler  CXR - worsening of the mixed interstitial and alveolar opacities throughout the periphery of the lungs  12/31 repeat CXR shows mild improvement   -1/2/22- Vapotherm in place- will wean as tolerated   -1/3/22- Vapotherm continued- prone position encouraged- Pulmonology following   -1/4/22- Vapotherm continued- prone positioning encouraged as tolerated- Pulm following  -1/5/22- Vapotherm in place with weaning tolerated- Social work consulted for LTAC placement   -1/6/22- Vapotherm in place with weaning continued as tolerated- [facility]   1/7/22-stable weaning. Awaiting placement.    Abnormal LFTs  -in the setting of COVID   Slowly trending down  No acute findings noted on liver US.       Abnormality of albumin  -in the setting of viral syndrome   Dietitian advised increased protein intake  -compliance with recommendation encouraged       Mild intermittent asthma without  complication  -Albuterol      Alpha-1-antitrypsin deficiency carrier  MZ phenotype  -outpatient follow up       Acute hypoxemic respiratory failure  Patient with Hypoxic Respiratory failure which is Acute.  she is not on home oxygen. Supplemental oxygen was provided and noted- Oxygen Concentration (%):  [75] 75.   Signs/symptoms of respiratory failure include- tachypnea. Contributing diagnoses includes - asthma Labs and images were reviewed. Patient Has not had a recent ABG. Will treat underlying causes and adjust management of respiratory failure   Hypoxia secondary to pneumonia  Suspected to be viral in nature - COVID + on 12/29/21  Normal WBC  Procalcitonin and BNP are normal  Sputum culture  Supplemental oxygen to maintain sats > 92 %- Vapotherm in place- will wean as tolerated   Incentive spirometer  CTA of Chest - Large volume solid and subsolid pulmonary opacities   -Pulmonology following-     Adult hypothyroidism  Continue synthroid        VTE Risk Mitigation (From admission, onward)         Ordered     enoxaparin injection 90 mg  2 times daily         12/28/21 0825     IP VTE HIGH RISK PATIENT  Once         12/27/21 1759     Place sequential compression device  Until discontinued         12/27/21 1759                Discharge Planning   SANDRA:      Code Status: Full Code   Is the patient medically ready for discharge?:     Reason for patient still in hospital (select all that apply): Pending disposition  Discharge Plan A: Home with family        Cristopher James NP  Department of Hospital Medicine   [facility] - Telemetry ([facility])

## 2022-01-07 NOTE — SUBJECTIVE & OBJECTIVE
Interval History: doing well. Family member at bedside.awaiting placement.    Review of Systems   Constitutional: Positive for activity change (improved). Negative for chills and diaphoresis.   HENT: Positive for congestion.    Eyes: Negative.    Respiratory: Positive for cough and shortness of breath. Negative for chest tightness and wheezing.    Cardiovascular: Positive for leg swelling (trace at ankles ). Negative for chest pain and palpitations.   Gastrointestinal: Negative for abdominal pain, diarrhea, nausea and vomiting.   Genitourinary: Negative.    Musculoskeletal: Negative.    Skin: Negative.    Neurological: Negative for dizziness, weakness and headaches.   Psychiatric/Behavioral: The patient is not nervous/anxious.         Objective:     Vital Signs (Most Recent):  Temp: 98 °F (36.7 °C) (01/07/22 1148)  Pulse: 86 (01/07/22 1305)  Resp: (!) 24 (01/07/22 1305)  BP: (!) 100/55 (01/07/22 1148)  SpO2: (!) 90 % (01/07/22 1305) Vital Signs (24h Range):  Temp:  [96.8 °F (36 °C)-98.4 °F (36.9 °C)] 98 °F (36.7 °C)  Pulse:  [] 86  Resp:  [17-25] 24  SpO2:  [90 %-100 %] 90 %  BP: (100-131)/(55-67) 100/55     Weight: 90.4 kg (199 lb 4.7 oz)  Body mass index is 31.21 kg/m².    Intake/Output Summary (Last 24 hours) at 1/7/2022 1421  Last data filed at 1/7/2022 0915  Gross per 24 hour   Intake 1830 ml   Output 1400 ml   Net 430 ml      Physical Exam  Vitals and nursing note reviewed.   Constitutional:       Appearance: She is well-developed. She is ill-appearing.   HENT:      Head: Normocephalic and atraumatic.      Nose: Nose normal.   Eyes:      Conjunctiva/sclera: Conjunctivae normal.   Cardiovascular:      Rate and Rhythm: Normal rate and regular rhythm.      Heart sounds: Normal heart sounds. No murmur heard.  No friction rub. No gallop.    Pulmonary:      Effort: No tachypnea, accessory muscle usage or respiratory distress.      Breath sounds: No decreased breath sounds or rales.      Comments: Left sided  breath sound more decreased than right side  Abdominal:      General: Bowel sounds are normal. There is no distension.      Palpations: Abdomen is soft.      Tenderness: There is no abdominal tenderness.   Genitourinary:     Comments: Deferred   Musculoskeletal:         General: No swelling or tenderness. Normal range of motion.      Cervical back: Normal range of motion and neck supple.   Skin:     General: Skin is warm and dry.   Neurological:      Mental Status: She is alert and oriented to person, place, and time.   Psychiatric:         Behavior: Behavior normal.       Significant Labs:   All pertinent labs within the past 24 hours have been reviewed.  CBC:   Recent Labs   Lab 01/07/22  0510   WBC 13.54*   HGB 12.0   HCT 37.6        Significant Imaging: I have reviewed all pertinent imaging results/findings within the past 24 hours.

## 2022-01-07 NOTE — ASSESSMENT & PLAN NOTE
+ COVID PCR  Normal WBC  Procalcitonin and BNP normal  Sputum culture pending  Supplemental oxygen to maintain sats > 92 %  Incentive spirometer  COVID inflammatory markers elevated  CTA of Chest Large volume solid and subsolid pulmonary opacities   Albuterol inhaler  CXR - worsening of the mixed interstitial and alveolar opacities throughout the periphery of the lungs  12/31 repeat CXR shows mild improvement   -1/2/22- Vapotherm in place- will wean as tolerated   -1/3/22- Vapotherm continued- prone position encouraged- Pulmonology following   -1/4/22- Vapotherm continued- prone positioning encouraged as tolerated- Pulm following  -1/5/22- Vapotherm in place with weaning tolerated- Social work consulted for LTAC placement   -1/6/22- Vapotherm in place with weaning continued as tolerated- LTAC placement in progress to [facility] pending   1/7/22-stable weaning. Awaiting placement.

## 2022-01-07 NOTE — PLAN OF CARE
CM spoke with Nancy from [facility]. Facility is still pending [employer] auth at this time. CM attempted to contact insurance and also discussed case with [place] team [place]. [facility] staff are also contacting [facility] for update.

## 2022-01-07 NOTE — PLAN OF CARE
Problem: Adult Inpatient Plan of Care  Goal: Plan of Care Review  Outcome: Ongoing, Progressing  Goal: Patient-Specific Goal (Individualized)  Outcome: Ongoing, Progressing  Goal: Absence of Hospital-Acquired Illness or Injury  Outcome: Ongoing, Progressing  Goal: Optimal Comfort and Wellbeing  Outcome: Ongoing, Progressing  Goal: Readiness for Transition of Care  Outcome: Ongoing, Progressing     Problem: Fluid Imbalance (Pneumonia)  Goal: Fluid Balance  Outcome: Ongoing, Progressing     Problem: Infection (Pneumonia)  Goal: Resolution of Infection Signs and Symptoms  Outcome: Ongoing, Progressing     Problem: Respiratory Compromise (Pneumonia)  Goal: Effective Oxygenation and Ventilation  Outcome: Ongoing, Progressing     Problem: Infection  Goal: Absence of Infection Signs and Symptoms  Outcome: Ongoing, Progressing     Problem: Gas Exchange Impaired  Goal: Optimal Gas Exchange  Outcome: Ongoing, Progressing

## 2022-01-08 VITALS
SYSTOLIC BLOOD PRESSURE: 116 MMHG | RESPIRATION RATE: 18 BRPM | DIASTOLIC BLOOD PRESSURE: 58 MMHG | TEMPERATURE: 98 F | OXYGEN SATURATION: 89 % | HEART RATE: 96 BPM | WEIGHT: 199.31 LBS | HEIGHT: 67 IN | BODY MASS INDEX: 31.28 KG/M2

## 2022-01-08 PROCEDURE — 25000003 PHARM REV CODE 250: Performed by: NURSE PRACTITIONER

## 2022-01-08 PROCEDURE — 99900035 HC TECH TIME PER 15 MIN (STAT)

## 2022-01-08 PROCEDURE — 94799 UNLISTED PULMONARY SVC/PX: CPT

## 2022-01-08 PROCEDURE — 94640 AIRWAY INHALATION TREATMENT: CPT

## 2022-01-08 PROCEDURE — 63600175 PHARM REV CODE 636 W HCPCS: Performed by: NURSE PRACTITIONER

## 2022-01-08 PROCEDURE — 27100171 HC OXYGEN HIGH FLOW UP TO 24 HOURS

## 2022-01-08 PROCEDURE — 94761 N-INVAS EAR/PLS OXIMETRY MLT: CPT

## 2022-01-08 RX ORDER — BUSPIRONE HYDROCHLORIDE 5 MG/1
5 TABLET ORAL NIGHTLY
Qty: 30 TABLET | Refills: 1 | Status: SHIPPED | OUTPATIENT
Start: 2022-01-08 | End: 2023-01-08

## 2022-01-08 RX ORDER — CETIRIZINE HYDROCHLORIDE 10 MG/1
10 TABLET ORAL DAILY
Qty: 30 TABLET | Refills: 1 | Status: SHIPPED | OUTPATIENT
Start: 2022-01-09 | End: 2023-01-09

## 2022-01-08 RX ORDER — ASCORBIC ACID 500 MG
500 TABLET ORAL 2 TIMES DAILY
Start: 2022-01-08

## 2022-01-08 RX ADMIN — CETIRIZINE HYDROCHLORIDE 10 MG: 10 TABLET, FILM COATED ORAL at 08:01

## 2022-01-08 RX ADMIN — THERA TABS 1 TABLET: TAB at 08:01

## 2022-01-08 RX ADMIN — FAMOTIDINE 20 MG: 20 TABLET ORAL at 08:01

## 2022-01-08 RX ADMIN — ENOXAPARIN SODIUM 90 MG: 100 INJECTION SUBCUTANEOUS at 08:01

## 2022-01-08 RX ADMIN — ALBUTEROL SULFATE 2 PUFF: 90 AEROSOL, METERED RESPIRATORY (INHALATION) at 02:01

## 2022-01-08 RX ADMIN — OXYCODONE HYDROCHLORIDE AND ACETAMINOPHEN 500 MG: 500 TABLET ORAL at 08:01

## 2022-01-08 RX ADMIN — ALBUTEROL SULFATE 2 PUFF: 90 AEROSOL, METERED RESPIRATORY (INHALATION) at 12:01

## 2022-01-08 RX ADMIN — GUAIFENESIN 600 MG: 600 TABLET, EXTENDED RELEASE ORAL at 08:01

## 2022-01-08 RX ADMIN — Medication 1000 UNITS: at 08:01

## 2022-01-08 RX ADMIN — ZINC SULFATE 220 MG (50 MG) CAPSULE 220 MG: CAPSULE at 08:01

## 2022-01-08 RX ADMIN — ACETAMINOPHEN 650 MG: 325 TABLET ORAL at 04:01

## 2022-01-08 RX ADMIN — ALBUTEROL SULFATE 2 PUFF: 90 AEROSOL, METERED RESPIRATORY (INHALATION) at 08:01

## 2022-01-08 NOTE — DISCHARGE SUMMARY
[place] - [facility] ([facility] Medicine  Discharge Summary      Patient Name: Yanira Medina  MRN: [id number]  Patient Class: IP- Inpatient  Admission Date: 12/27/2021  Hospital Length of Stay: 11 days  Discharge Date and Time:  01/08/2022 10:03 AM  Attending Physician: Indu Freeman MD   Discharging Provider: Cristopher James NP  Primary Care Provider: Juvenal White MD      HPI:   Yanira Medina is a 46 y.o. female who presents with 2-3 days of gradual onset, worsening, severe SOB with associated chest tightness and cough at home.  Multiple family members sick with viral/URI symptoms and one tested positive for COVID-19. Pt explains approx 3 days ago she developed sinus symptoms, severe headache, chills, sweats and had 1 episode of severe vomiting. The symptoms commenced to cough and SOB. Pt denies any recorded fevers. She notes SOB with exertion but reports feeling better as her headache has resolved. In the ED, her pulse ox was 86 % with exercise. COVID rapid screen was negative. COVID marker labs are pending. CXR - scattered pulmonary infiltrates. CTA of Chest is pending for further clarification and to rule out pulmonary embolism. Currently, pt is requiring 3 to 4 liters of oxygen via nasal cannula. Pt denies admission to hospital for asthma exacerbation.   CBC notes a left shift at 90 %, Alk Phos 207, AST 50, ALT 45.  COVID screen was negative  As clarification, on 12/27/2021 patient should be admitted for hospital observation services under my care in collaboration with Indu Freeman MD    Admitting diagnoses include pneumonia and hypoxic respiratory failure.          * No surgery found *      Hospital Course:   Worsening hypoxic respiratory failure overnight. Now on 15 L HFNC. Mild tachypnea but no obvious distress or increased work of breathing. COVID PCR pending. COVID inflammatory markers are elevated. Procalcitonin and BNP negative. CTA of Chest negative for PE but  large volue solid and subsolid pulmonary opacities concerning for COVID. Pt with hx of asthma and alpha tripsyn 1. Initially she refused remdesivir infusion but after seen by Dr. Cannon - pt agreeable to receive remdesivir. Strongly suspect COVID pneumonia due to clinical picture and physical exam.   12/29 Pt changed her mind about remdesivir. Also, refused Lovenox therapeutic anticoagulation. Today + COVID screen. Overnight increased oxygen demand due to worsened hypoxia with exertion. Pt wearing both 15 L and venti mask with oxygen at 33 %. SpO2 92 %. Mild tachypnea but no increased work of breathing. CXR worsened. Pt highly motivated with cough, deep breath and use of IS. Explained rationale of remdesevir but pt refuses administration. This was explained by both Nabil Cannon and Xochitl. Decadron and albuterol continued.   12/30 - Pt states she found out that [facility] does not have beds. She is okay with staying here and continuing treatment. She verbalizes understanding of the high level of oxygen requirement. She had an uneventful night and denies any changes in her breathing. Her cough is productive of pale yellow sputum with small amount blood tinged. She is less anxious today - pleasant demeanor. Highly motivated in cough/deep breathing and use of incentive spirometer. Currently on 15 L nasal cannula with  SpO2 95  %. Decadron, albuterol and LMWH in progress. She has mild edema to right foot which patient says is chronic. She denies any leg pain. Slight elevation in liver enzymes from baseline AST 41 >> 89; ALT 38 >> 94.  CRP down; ferritin &  sed rate more elevated.   12/31- Patient OOB, dyspnea improving. HFNC weaned down to 13 L, patient tolerating well, SpO2 93%. CXR reviewed- mild improvement noted. Continue decadron, albuterol and LMWH. Slight elevation in liver enzymes from baseline AST 41 >> 89; ALT 38 >> 94. No acute findings on abdominal US.   1/1/22 - CXR from 12/31 shows very mild  improvement of infiltrates. Today, she describes fatigue. Endorses a productive cough. COVID inflammatory markers are improving. Slight elevation is liver enzymes. Was on 15 L oxygen and now on 13 L oxygen but uses a venti mask on top of the nasal cannula at times. Describes nasal congestion - saline and afrin prescribed.   1/2/22- pt sitting up in bed with Vapotherm in place and reports symptom improvement. Leukocytosis noted in the setting of steroid use. Zinc initiated. Vitamin D level pending.  Plan of care discussed with patient and daughter at bedside and understanding verbalized.   1/3/22- pt sitting in chair and reports compliance with incentive spirometry. Pt remains on Vapotherm with oxygen saturations from 91%-93%. Buspar nightly requested to assist with anxiety.  Pt encouraged to remain in prone position as tolerated with understanding verbalized.  Current plan of care continued as previously prescribed.   On 1/4/22, pt sitting to side of bed with Vapotherm in place and O2 sats 95%-91%.  Will wean as tolerated. Pt encouraged to prone with understanding verbalized.  Current plan of care continued as previously prescribed.   On 1/5/22- pt sitting up in chair and continues to tolerate weaning of Vapotherm.  Social work consulted to assist with discharge planning and LTAC placement and referrals placed.  Pt remains stable and verbalized symptom improvement with no signs of acute distress.  Current plan of care continued as previously prescribed.   On 1/6/22, pt stable on Vapotherm with weaning continued as tolerated.  LTAC placement in progress.    1/7/22-inflammatory markers from 1/6/22 are stable. CRP, LDH, and SED continues to trend down. Ferritin slightly higher, but not a significant increase. Has been stable on 25L vapotherm. Noted slight increase in leukocytosis. Awaiting authorization for [facility].  1/8/22-patient is now down to 15/75 via vapotherm today. Has been accepted to [facility]. She is stable  for discharge.       Goals of Care Treatment Preferences:  Code Status: Full Code      Consults:   Consults (From admission, onward)        Status Ordering Provider     Inpatient consult to Social Work  Once        Provider:  (Not yet assigned)    Acknowledged JOSE LECHUGA     Inpatient consult to Social Work/Case Management  Once        Provider:  (Not yet assigned)    Completed NAN JAIME     Inpatient consult to Registered Dietitian/Nutritionist  Once        Provider:  (Not yet assigned)    Completed MICHAEL BALLARD     Inpatient consult to Pulmonology  Once        Provider:  Michael Ballard MD    Completed ALEKSANDAR MARISCAL          No new Assessment & Plan notes have been filed under this hospital service since the last note was generated.  Service: Hospital Medicine    Final Active Diagnoses:    Diagnosis Date Noted POA    PRINCIPAL PROBLEM:  Pneumonia due to COVID-19 virus [U07.1, J12.82] 12/28/2021 Yes    Abnormality of albumin [R77.0] 12/30/2021 Unknown    Abnormal LFTs [R94.5] 12/30/2021 Unknown    Acute hypoxemic respiratory failure [J96.01] 12/28/2021 Yes    Alpha-1-antitrypsin deficiency carrier [Z14.8] 09/16/2019 Not Applicable    Mild intermittent asthma without complication [J45.20] 03/14/2019 Yes    Adult hypothyroidism [E03.9] 01/28/2013 Yes      Problems Resolved During this Admission:       Discharged Condition: stable    Disposition: Long Term Acute Care    Follow Up:   Follow-up Information     Juvenal White MD In 1 week.    Specialty: Internal Medicine  Contact information:  [address] [place] [id number] [phone]                       Patient Instructions:   No discharge procedures on file.    Significant Diagnostic Studies: Labs:   CMP No results for input(s): NA, K, CL, CO2, GLU, BUN, CREATININE, CALCIUM, PROT, ALBUMIN, BILITOT, ALKPHOS, AST, ALT, ANIONGAP, ESTGFRAFRICA, EGFRNONAA in the last 48 hours. and CBC   Recent Labs   Lab  01/07/22  0510   WBC 13.54*   HGB 12.0   HCT 37.6          Pending Diagnostic Studies:     None         Medications:  Reconciled Home Medications:      Medication List      START taking these medications    ascorbic acid (vitamin C) 500 MG tablet  Commonly known as: VITAMIN C  Take 1 tablet (500 mg total) by mouth 2 (two) times daily.     busPIRone 5 MG Tab  Commonly known as: BUSPAR  Take 1 tablet (5 mg total) by mouth every evening.     cetirizine 10 MG tablet  Commonly known as: ZYRTEC  Take 1 tablet (10 mg total) by mouth once daily.  Start taking on: January 9, 2022        CONTINUE taking these medications    ferrous sulfate 325 mg (65 mg iron) Tab tablet  Commonly known as: FEOSOL  Take 325 mg by mouth.     PROAIR HFA 90 mcg/actuation inhaler  Generic drug: albuterol  INHALE 2 PUFFS INTO THE LUNGS EVERY 4 HOURS AS NEEDED     SYNTHROID 125 MCG tablet  Generic drug: levothyroxine  Take 125 mcg by mouth once daily.        STOP taking these medications    naproxen sodium 550 MG tablet  Commonly known as: ANAPROX            Indwelling Lines/Drains at time of discharge:   Lines/Drains/Airways     None                 Time spent on the discharge of patient: >35 minutes         Cristopher James NP  Department of Hospital Medicine  [place] - Telemetry ([facility])

## 2022-01-08 NOTE — PLAN OF CARE
Pt remains fall free this shift.  Pt AAOx4, verbal, clear speech.  Skin warm and dry. No new skin issues.  Telemonitoring in progress, 60s-80s SR  Vapotherm 20/75  BSC  Independent with transfers.  CBG AC/ HS with PRN SS insulin.   Bed low, side rails up x 2, wheels locked, call light in reach.   Bed alarm maintained for safety.   Patient instructed to call for assistance.   Hourly rounding completed.   24 hour chart check completed  POC updated and reviewed with pt. Will continue POC.

## 2022-01-08 NOTE — PLAN OF CARE
[place] - Telemetry (Hospital)  Discharge Final Note    Primary Care Provider: Juvenal White MD    Expected Discharge Date: 1/8/2022    Final Discharge Note (most recent)       Final Note - 01/08/22 0959          Final Note    Assessment Type Final Discharge Note (P)      Anticipated Discharge Disposition Long Term Acute Care (P)      Hospital Resources/Appts/Education Provided Post-Acute resouces added to AVS (P)         Post-Acute Status    Post-Acute Authorization Placement (P)      Post-Acute Placement Status Set-up Complete/Auth obtained (P)      Discharge Delays None known at this time (P)                      Important Message from Medicare             Contact Info       Juvenal White MD   Specialty: Internal Medicine   Relationship: PCP - General    [address] [place] [id number]   Phone: [phone]       Next Steps: Follow up in 1 week(s)            Nurse can call report to [phone] Please ask for charge nurse.   Transportation will be set up once report is called.     Lyric Noble LMSW 1/8/2022 10:01 AM

## 2022-01-08 NOTE — PLAN OF CARE
CHARLES spoke with Nancy from [facility] who reported that they have auth for patient to transfer today.  SW called for D/C orders to be placed and sent to facility. Pending confirmation acceptance of D/C order from facility and number for report.     Lyric Noble LMSW 1/8/2022 9:50 AM